# Patient Record
Sex: MALE | Race: BLACK OR AFRICAN AMERICAN | Employment: OTHER | URBAN - METROPOLITAN AREA
[De-identification: names, ages, dates, MRNs, and addresses within clinical notes are randomized per-mention and may not be internally consistent; named-entity substitution may affect disease eponyms.]

---

## 2018-08-17 ENCOUNTER — HOSPITAL ENCOUNTER (OUTPATIENT)
Age: 78
Setting detail: SPECIMEN
Discharge: HOME | End: 2018-08-17
Payer: MEDICARE

## 2018-08-17 LAB
ANION GAP SERPL CALCULATED.3IONS-SCNC: 13 MMOL/L (ref 9–17)
BUN BLDV-MCNC: 52 MG/DL (ref 8–23)
BUN/CREAT BLD: ABNORMAL (ref 9–20)
CALCIUM SERPL-MCNC: 8.8 MG/DL (ref 8.6–10.4)
CHLORIDE BLD-SCNC: 102 MMOL/L (ref 98–107)
CO2: 22 MMOL/L (ref 20–31)
CREAT SERPL-MCNC: 1.66 MG/DL (ref 0.7–1.2)
GFR AFRICAN AMERICAN: 49 ML/MIN
GFR NON-AFRICAN AMERICAN: 40 ML/MIN
GFR SERPL CREATININE-BSD FRML MDRD: ABNORMAL ML/MIN/{1.73_M2}
GFR SERPL CREATININE-BSD FRML MDRD: ABNORMAL ML/MIN/{1.73_M2}
GLUCOSE BLD-MCNC: 71 MG/DL (ref 70–99)
HCT VFR BLD CALC: 29.8 % (ref 40.7–50.3)
HEMOGLOBIN: 9.3 G/DL (ref 13–17)
MCH RBC QN AUTO: 30.4 PG (ref 25.2–33.5)
MCHC RBC AUTO-ENTMCNC: 31.2 G/DL (ref 28.4–34.8)
MCV RBC AUTO: 97.4 FL (ref 82.6–102.9)
NRBC AUTOMATED: 0 PER 100 WBC
PDW BLD-RTO: 15.1 % (ref 11.8–14.4)
PLATELET # BLD: 233 K/UL (ref 138–453)
PMV BLD AUTO: 9.1 FL (ref 8.1–13.5)
POTASSIUM SERPL-SCNC: 5.4 MMOL/L (ref 3.7–5.3)
RBC # BLD: 3.06 M/UL (ref 4.21–5.77)
SODIUM BLD-SCNC: 137 MMOL/L (ref 135–144)
WBC # BLD: 4.1 K/UL (ref 3.5–11.3)

## 2018-08-17 PROCEDURE — 36415 COLL VENOUS BLD VENIPUNCTURE: CPT

## 2018-08-17 PROCEDURE — P9603 ONE-WAY ALLOW PRORATED MILES: HCPCS

## 2018-08-17 PROCEDURE — 80048 BASIC METABOLIC PNL TOTAL CA: CPT

## 2018-08-17 PROCEDURE — 85027 COMPLETE CBC AUTOMATED: CPT

## 2018-08-24 ENCOUNTER — HOSPITAL ENCOUNTER (OUTPATIENT)
Age: 78
Setting detail: SPECIMEN
Discharge: HOME | End: 2018-08-24
Payer: MEDICARE

## 2018-08-24 LAB
ANION GAP SERPL CALCULATED.3IONS-SCNC: 11 MMOL/L (ref 9–17)
BUN BLDV-MCNC: 49 MG/DL (ref 8–23)
BUN/CREAT BLD: ABNORMAL (ref 9–20)
CALCIUM SERPL-MCNC: 8.9 MG/DL (ref 8.6–10.4)
CHLORIDE BLD-SCNC: 106 MMOL/L (ref 98–107)
CO2: 21 MMOL/L (ref 20–31)
CREAT SERPL-MCNC: 1.41 MG/DL (ref 0.7–1.2)
GFR AFRICAN AMERICAN: 59 ML/MIN
GFR NON-AFRICAN AMERICAN: 49 ML/MIN
GFR SERPL CREATININE-BSD FRML MDRD: ABNORMAL ML/MIN/{1.73_M2}
GFR SERPL CREATININE-BSD FRML MDRD: ABNORMAL ML/MIN/{1.73_M2}
GLUCOSE BLD-MCNC: 72 MG/DL (ref 70–99)
HCT VFR BLD CALC: 29.1 % (ref 40.7–50.3)
HEMOGLOBIN: 9.4 G/DL (ref 13–17)
MCH RBC QN AUTO: 30.8 PG (ref 25.2–33.5)
MCHC RBC AUTO-ENTMCNC: 32.3 G/DL (ref 28.4–34.8)
MCV RBC AUTO: 95.4 FL (ref 82.6–102.9)
NRBC AUTOMATED: 0 PER 100 WBC
PDW BLD-RTO: 15 % (ref 11.8–14.4)
PLATELET # BLD: 257 K/UL (ref 138–453)
PMV BLD AUTO: 9 FL (ref 8.1–13.5)
POTASSIUM SERPL-SCNC: 6 MMOL/L (ref 3.7–5.3)
RBC # BLD: 3.05 M/UL (ref 4.21–5.77)
SODIUM BLD-SCNC: 138 MMOL/L (ref 135–144)
WBC # BLD: 3.8 K/UL (ref 3.5–11.3)

## 2018-08-24 PROCEDURE — 85027 COMPLETE CBC AUTOMATED: CPT

## 2018-08-24 PROCEDURE — 80048 BASIC METABOLIC PNL TOTAL CA: CPT

## 2018-08-24 PROCEDURE — 36415 COLL VENOUS BLD VENIPUNCTURE: CPT

## 2018-08-24 PROCEDURE — P9603 ONE-WAY ALLOW PRORATED MILES: HCPCS

## 2018-08-27 ENCOUNTER — HOSPITAL ENCOUNTER (OUTPATIENT)
Age: 78
Setting detail: SPECIMEN
Discharge: HOME | End: 2018-08-27
Payer: MEDICARE

## 2018-08-27 LAB
ANION GAP SERPL CALCULATED.3IONS-SCNC: 12 MMOL/L (ref 9–17)
BUN BLDV-MCNC: 47 MG/DL (ref 8–23)
BUN/CREAT BLD: ABNORMAL (ref 9–20)
CALCIUM SERPL-MCNC: 9.1 MG/DL (ref 8.6–10.4)
CHLORIDE BLD-SCNC: 109 MMOL/L (ref 98–107)
CO2: 20 MMOL/L (ref 20–31)
CREAT SERPL-MCNC: 1.59 MG/DL (ref 0.7–1.2)
GFR AFRICAN AMERICAN: 51 ML/MIN
GFR NON-AFRICAN AMERICAN: 42 ML/MIN
GFR SERPL CREATININE-BSD FRML MDRD: ABNORMAL ML/MIN/{1.73_M2}
GFR SERPL CREATININE-BSD FRML MDRD: ABNORMAL ML/MIN/{1.73_M2}
GLUCOSE BLD-MCNC: 57 MG/DL (ref 70–99)
HCT VFR BLD CALC: 30.8 % (ref 40.7–50.3)
HEMOGLOBIN: 9.6 G/DL (ref 13–17)
MCH RBC QN AUTO: 29.8 PG (ref 25.2–33.5)
MCHC RBC AUTO-ENTMCNC: 31.2 G/DL (ref 28.4–34.8)
MCV RBC AUTO: 95.7 FL (ref 82.6–102.9)
NRBC AUTOMATED: 0 PER 100 WBC
PDW BLD-RTO: 15.1 % (ref 11.8–14.4)
PLATELET # BLD: 248 K/UL (ref 138–453)
PMV BLD AUTO: 9.3 FL (ref 8.1–13.5)
POTASSIUM SERPL-SCNC: 5.8 MMOL/L (ref 3.7–5.3)
RBC # BLD: 3.22 M/UL (ref 4.21–5.77)
SODIUM BLD-SCNC: 141 MMOL/L (ref 135–144)
WBC # BLD: 3.7 K/UL (ref 3.5–11.3)

## 2018-08-27 PROCEDURE — P9603 ONE-WAY ALLOW PRORATED MILES: HCPCS

## 2018-08-27 PROCEDURE — 80048 BASIC METABOLIC PNL TOTAL CA: CPT

## 2018-08-27 PROCEDURE — 36415 COLL VENOUS BLD VENIPUNCTURE: CPT

## 2018-08-27 PROCEDURE — 85027 COMPLETE CBC AUTOMATED: CPT

## 2018-08-31 ENCOUNTER — HOSPITAL ENCOUNTER (OUTPATIENT)
Age: 78
Setting detail: SPECIMEN
Discharge: HOME | End: 2018-08-31
Payer: MEDICARE

## 2018-08-31 LAB
ANION GAP SERPL CALCULATED.3IONS-SCNC: 15 MMOL/L (ref 9–17)
BUN BLDV-MCNC: 61 MG/DL (ref 8–23)
BUN/CREAT BLD: ABNORMAL (ref 9–20)
CALCIUM SERPL-MCNC: 9.8 MG/DL (ref 8.6–10.4)
CHLORIDE BLD-SCNC: 95 MMOL/L (ref 98–107)
CO2: 25 MMOL/L (ref 20–31)
CREAT SERPL-MCNC: 1.72 MG/DL (ref 0.7–1.2)
GFR AFRICAN AMERICAN: 47 ML/MIN
GFR NON-AFRICAN AMERICAN: 39 ML/MIN
GFR SERPL CREATININE-BSD FRML MDRD: ABNORMAL ML/MIN/{1.73_M2}
GFR SERPL CREATININE-BSD FRML MDRD: ABNORMAL ML/MIN/{1.73_M2}
GLUCOSE BLD-MCNC: 111 MG/DL (ref 70–99)
HCT VFR BLD CALC: 41.3 % (ref 40.7–50.3)
HEMOGLOBIN: 12.7 G/DL (ref 13–17)
MCH RBC QN AUTO: 30.2 PG (ref 25.2–33.5)
MCHC RBC AUTO-ENTMCNC: 30.8 G/DL (ref 28.4–34.8)
MCV RBC AUTO: 98.3 FL (ref 82.6–102.9)
NRBC AUTOMATED: 0.4 PER 100 WBC
PDW BLD-RTO: 17.5 % (ref 11.8–14.4)
PLATELET # BLD: 122 K/UL (ref 138–453)
PMV BLD AUTO: 12.6 FL (ref 8.1–13.5)
POTASSIUM SERPL-SCNC: 4.4 MMOL/L (ref 3.7–5.3)
RBC # BLD: 4.2 M/UL (ref 4.21–5.77)
SODIUM BLD-SCNC: 135 MMOL/L (ref 135–144)
WBC # BLD: 8.3 K/UL (ref 3.5–11.3)

## 2018-08-31 PROCEDURE — 36415 COLL VENOUS BLD VENIPUNCTURE: CPT

## 2018-08-31 PROCEDURE — 80048 BASIC METABOLIC PNL TOTAL CA: CPT

## 2018-08-31 PROCEDURE — P9603 ONE-WAY ALLOW PRORATED MILES: HCPCS

## 2018-08-31 PROCEDURE — 85027 COMPLETE CBC AUTOMATED: CPT

## 2018-12-18 ENCOUNTER — HOSPITAL ENCOUNTER (INPATIENT)
Age: 78
LOS: 8 days | Discharge: NURSING, REHAB OR LONG-TERM CARE | DRG: 682 | End: 2018-12-26
Attending: EMERGENCY MEDICINE | Admitting: INTERNAL MEDICINE
Payer: MEDICARE

## 2018-12-18 ENCOUNTER — APPOINTMENT (OUTPATIENT)
Dept: GENERAL RADIOLOGY | Age: 78
DRG: 682 | End: 2018-12-18
Payer: MEDICARE

## 2018-12-18 DIAGNOSIS — D64.9 ANEMIA, UNSPECIFIED TYPE: ICD-10-CM

## 2018-12-18 DIAGNOSIS — N17.9 AKI (ACUTE KIDNEY INJURY) (HCC): ICD-10-CM

## 2018-12-18 DIAGNOSIS — R41.82 ALTERED MENTAL STATUS, UNSPECIFIED ALTERED MENTAL STATUS TYPE: Primary | ICD-10-CM

## 2018-12-18 LAB
-: NORMAL
ABSOLUTE EOS #: 0 K/UL (ref 0–0.4)
ABSOLUTE IMMATURE GRANULOCYTE: 0 K/UL (ref 0–0.3)
ABSOLUTE LYMPH #: 0.42 K/UL (ref 1–4.8)
ABSOLUTE MONO #: 0.47 K/UL (ref 0.1–0.8)
ALBUMIN SERPL-MCNC: 3.5 G/DL (ref 3.5–5.2)
ALBUMIN/GLOBULIN RATIO: 0.9 (ref 1–2.5)
ALLEN TEST: ABNORMAL
ALP BLD-CCNC: 69 U/L (ref 40–129)
ALT SERPL-CCNC: 16 U/L (ref 5–41)
AMMONIA: 32 UMOL/L (ref 16–60)
AMORPHOUS: NORMAL
ANION GAP SERPL CALCULATED.3IONS-SCNC: 13 MMOL/L (ref 9–17)
ANION GAP: 7 MMOL/L (ref 7–16)
AST SERPL-CCNC: 52 U/L
BACTERIA: NORMAL
BASOPHILS # BLD: 0 % (ref 0–2)
BASOPHILS ABSOLUTE: 0 K/UL (ref 0–0.2)
BILIRUB SERPL-MCNC: 0.23 MG/DL (ref 0.3–1.2)
BILIRUBIN URINE: NEGATIVE
BUN BLDV-MCNC: 53 MG/DL (ref 8–23)
BUN/CREAT BLD: ABNORMAL (ref 9–20)
CALCIUM SERPL-MCNC: 9 MG/DL (ref 8.6–10.4)
CASTS UA: NORMAL /LPF (ref 0–8)
CHLORIDE BLD-SCNC: 110 MMOL/L (ref 98–107)
CO2: 18 MMOL/L (ref 20–31)
COLOR: YELLOW
COMMENT UA: ABNORMAL
CREAT SERPL-MCNC: 2.25 MG/DL (ref 0.7–1.2)
CRYSTALS, UA: NORMAL /HPF
DIFFERENTIAL TYPE: ABNORMAL
EKG ATRIAL RATE: 94 BPM
EKG P AXIS: 39 DEGREES
EKG P-R INTERVAL: 132 MS
EKG Q-T INTERVAL: 342 MS
EKG QRS DURATION: 84 MS
EKG QTC CALCULATION (BAZETT): 427 MS
EKG R AXIS: 60 DEGREES
EKG T AXIS: 55 DEGREES
EKG VENTRICULAR RATE: 94 BPM
EOSINOPHILS RELATIVE PERCENT: 0 % (ref 1–4)
EPITHELIAL CELLS UA: NORMAL /HPF (ref 0–5)
FIO2: ABNORMAL
GFR AFRICAN AMERICAN: 34 ML/MIN
GFR NON-AFRICAN AMERICAN: 28 ML/MIN
GFR NON-AFRICAN AMERICAN: 32 ML/MIN
GFR SERPL CREATININE-BSD FRML MDRD: 39 ML/MIN
GFR SERPL CREATININE-BSD FRML MDRD: ABNORMAL ML/MIN/{1.73_M2}
GLUCOSE BLD-MCNC: 120 MG/DL (ref 74–100)
GLUCOSE BLD-MCNC: 127 MG/DL (ref 70–99)
GLUCOSE URINE: NEGATIVE
HCO3 VENOUS: 19.2 MMOL/L (ref 22–29)
HCT VFR BLD CALC: 31.9 % (ref 40.7–50.3)
HEMOGLOBIN: 9.9 G/DL (ref 13–17)
IMMATURE GRANULOCYTES: 0 %
INR BLD: 0.9
KETONES, URINE: NEGATIVE
LACTIC ACID, SEPSIS WHOLE BLOOD: 0.8 MMOL/L (ref 0.5–1.9)
LACTIC ACID, SEPSIS WHOLE BLOOD: 1.3 MMOL/L (ref 0.5–1.9)
LACTIC ACID, SEPSIS WHOLE BLOOD: 1.7 MMOL/L (ref 0.5–1.9)
LACTIC ACID, SEPSIS: NORMAL MMOL/L (ref 0.5–1.9)
LEUKOCYTE ESTERASE, URINE: NEGATIVE
LYMPHOCYTES # BLD: 8 % (ref 24–44)
MAGNESIUM: 2.4 MG/DL (ref 1.6–2.6)
MCH RBC QN AUTO: 28.2 PG (ref 25.2–33.5)
MCHC RBC AUTO-ENTMCNC: 31 G/DL (ref 28.4–34.8)
MCV RBC AUTO: 90.9 FL (ref 82.6–102.9)
MODE: ABNORMAL
MONOCYTES # BLD: 9 % (ref 1–7)
MORPHOLOGY: ABNORMAL
MUCUS: NORMAL
NEGATIVE BASE EXCESS, VEN: 6 (ref 0–2)
NITRITE, URINE: NEGATIVE
NRBC AUTOMATED: 0 PER 100 WBC
O2 DEVICE/FLOW/%: ABNORMAL
O2 SAT, VEN: 62 % (ref 60–85)
OTHER OBSERVATIONS UA: NORMAL
PARTIAL THROMBOPLASTIN TIME: 30.3 SEC (ref 20.5–30.5)
PATIENT TEMP: ABNORMAL
PCO2, VEN: 35.8 MM HG (ref 41–51)
PDW BLD-RTO: 16.2 % (ref 11.8–14.4)
PH UA: 5 (ref 5–8)
PH VENOUS: 7.34 (ref 7.32–7.43)
PLATELET # BLD: 230 K/UL (ref 138–453)
PLATELET ESTIMATE: ABNORMAL
PMV BLD AUTO: 8.9 FL (ref 8.1–13.5)
PO2, VEN: 34.1 MM HG (ref 30–50)
POC CHLORIDE: 119 MMOL/L (ref 98–107)
POC CREATININE: 2.01 MG/DL (ref 0.51–1.19)
POC HEMATOCRIT: 29 % (ref 41–53)
POC HEMOGLOBIN: 10 G/DL (ref 13.5–17.5)
POC IONIZED CALCIUM: 1.13 MMOL/L (ref 1.15–1.33)
POC LACTIC ACID: 1.76 MMOL/L (ref 0.56–1.39)
POC PCO2 TEMP: ABNORMAL MM HG
POC PH TEMP: ABNORMAL
POC PO2 TEMP: ABNORMAL MM HG
POC POTASSIUM: 4.5 MMOL/L (ref 3.5–4.5)
POC SODIUM: 145 MMOL/L (ref 138–146)
POC TROPONIN I: 0.02 NG/ML (ref 0–0.1)
POC TROPONIN I: 0.03 NG/ML (ref 0–0.1)
POC TROPONIN INTERP: NORMAL
POC TROPONIN INTERP: NORMAL
POSITIVE BASE EXCESS, VEN: ABNORMAL (ref 0–3)
POTASSIUM SERPL-SCNC: 4.4 MMOL/L (ref 3.7–5.3)
PROTEIN UA: ABNORMAL
PROTHROMBIN TIME: 9.6 SEC (ref 9–12)
RBC # BLD: 3.51 M/UL (ref 4.21–5.77)
RBC # BLD: ABNORMAL 10*6/UL
RBC UA: NORMAL /HPF (ref 0–4)
RENAL EPITHELIAL, UA: NORMAL /HPF
SAMPLE SITE: ABNORMAL
SEG NEUTROPHILS: 83 % (ref 36–66)
SEGMENTED NEUTROPHILS ABSOLUTE COUNT: 4.31 K/UL (ref 1.8–7.7)
SODIUM BLD-SCNC: 141 MMOL/L (ref 135–144)
SPECIFIC GRAVITY UA: 1.02 (ref 1–1.03)
TOTAL CO2, VENOUS: 20 MMOL/L (ref 23–30)
TOTAL PROTEIN: 7.2 G/DL (ref 6.4–8.3)
TRICHOMONAS: NORMAL
TURBIDITY: CLEAR
URINE HGB: ABNORMAL
UROBILINOGEN, URINE: NORMAL
WBC # BLD: 5.2 K/UL (ref 3.5–11.3)
WBC # BLD: ABNORMAL 10*3/UL
WBC UA: NORMAL /HPF (ref 0–5)
YEAST: NORMAL

## 2018-12-18 PROCEDURE — P9612 CATHETERIZE FOR URINE SPEC: HCPCS

## 2018-12-18 PROCEDURE — 82803 BLOOD GASES ANY COMBINATION: CPT

## 2018-12-18 PROCEDURE — 1200000000 HC SEMI PRIVATE

## 2018-12-18 PROCEDURE — 83735 ASSAY OF MAGNESIUM: CPT

## 2018-12-18 PROCEDURE — 96367 TX/PROPH/DG ADDL SEQ IV INF: CPT

## 2018-12-18 PROCEDURE — 82565 ASSAY OF CREATININE: CPT

## 2018-12-18 PROCEDURE — 99285 EMERGENCY DEPT VISIT HI MDM: CPT

## 2018-12-18 PROCEDURE — 81001 URINALYSIS AUTO W/SCOPE: CPT

## 2018-12-18 PROCEDURE — 87040 BLOOD CULTURE FOR BACTERIA: CPT

## 2018-12-18 PROCEDURE — 84295 ASSAY OF SERUM SODIUM: CPT

## 2018-12-18 PROCEDURE — 85610 PROTHROMBIN TIME: CPT

## 2018-12-18 PROCEDURE — 82140 ASSAY OF AMMONIA: CPT

## 2018-12-18 PROCEDURE — 96368 THER/DIAG CONCURRENT INF: CPT

## 2018-12-18 PROCEDURE — 84484 ASSAY OF TROPONIN QUANT: CPT

## 2018-12-18 PROCEDURE — 93005 ELECTROCARDIOGRAM TRACING: CPT

## 2018-12-18 PROCEDURE — 82947 ASSAY GLUCOSE BLOOD QUANT: CPT

## 2018-12-18 PROCEDURE — 83605 ASSAY OF LACTIC ACID: CPT

## 2018-12-18 PROCEDURE — 82435 ASSAY OF BLOOD CHLORIDE: CPT

## 2018-12-18 PROCEDURE — 85730 THROMBOPLASTIN TIME PARTIAL: CPT

## 2018-12-18 PROCEDURE — 96365 THER/PROPH/DIAG IV INF INIT: CPT

## 2018-12-18 PROCEDURE — 84132 ASSAY OF SERUM POTASSIUM: CPT

## 2018-12-18 PROCEDURE — 85014 HEMATOCRIT: CPT

## 2018-12-18 PROCEDURE — 80053 COMPREHEN METABOLIC PANEL: CPT

## 2018-12-18 PROCEDURE — 2580000003 HC RX 258: Performed by: EMERGENCY MEDICINE

## 2018-12-18 PROCEDURE — 85025 COMPLETE CBC W/AUTO DIFF WBC: CPT

## 2018-12-18 PROCEDURE — 82330 ASSAY OF CALCIUM: CPT

## 2018-12-18 PROCEDURE — 6360000002 HC RX W HCPCS: Performed by: EMERGENCY MEDICINE

## 2018-12-18 PROCEDURE — 96366 THER/PROPH/DIAG IV INF ADDON: CPT

## 2018-12-18 PROCEDURE — 71045 X-RAY EXAM CHEST 1 VIEW: CPT

## 2018-12-18 RX ORDER — TRAMADOL HYDROCHLORIDE 50 MG/1
50 TABLET ORAL EVERY 6 HOURS PRN
Status: ON HOLD | COMMUNITY
End: 2018-12-21 | Stop reason: HOSPADM

## 2018-12-18 RX ORDER — VITAMIN E 268 MG
400 CAPSULE ORAL DAILY
COMMUNITY

## 2018-12-18 RX ORDER — TAMSULOSIN HYDROCHLORIDE 0.4 MG/1
0.4 CAPSULE ORAL DAILY
COMMUNITY

## 2018-12-18 RX ORDER — DOCUSATE SODIUM 100 MG/1
100 CAPSULE, LIQUID FILLED ORAL NIGHTLY
COMMUNITY

## 2018-12-18 RX ORDER — MIRTAZAPINE 30 MG/1
30 TABLET, FILM COATED ORAL NIGHTLY
COMMUNITY

## 2018-12-18 RX ORDER — LOSARTAN POTASSIUM 100 MG/1
100 TABLET ORAL DAILY
Status: ON HOLD | COMMUNITY
End: 2019-05-07 | Stop reason: HOSPADM

## 2018-12-18 RX ORDER — SODIUM CHLORIDE 0.9 % (FLUSH) 0.9 %
10 SYRINGE (ML) INJECTION EVERY 12 HOURS SCHEDULED
Status: DISCONTINUED | OUTPATIENT
Start: 2018-12-18 | End: 2018-12-19

## 2018-12-18 RX ORDER — VANCOMYCIN HYDROCHLORIDE 1 G/200ML
15 INJECTION, SOLUTION INTRAVENOUS ONCE
Status: COMPLETED | OUTPATIENT
Start: 2018-12-18 | End: 2018-12-19

## 2018-12-18 RX ORDER — HYDROCHLOROTHIAZIDE 12.5 MG/1
12.5 TABLET ORAL DAILY
Status: ON HOLD | COMMUNITY
End: 2018-12-25 | Stop reason: HOSPADM

## 2018-12-18 RX ORDER — SODIUM CHLORIDE, SODIUM LACTATE, POTASSIUM CHLORIDE, AND CALCIUM CHLORIDE .6; .31; .03; .02 G/100ML; G/100ML; G/100ML; G/100ML
30 INJECTION, SOLUTION INTRAVENOUS ONCE
Status: COMPLETED | OUTPATIENT
Start: 2018-12-18 | End: 2018-12-19

## 2018-12-18 RX ORDER — SODIUM CHLORIDE 0.9 % (FLUSH) 0.9 %
10 SYRINGE (ML) INJECTION PRN
Status: DISCONTINUED | OUTPATIENT
Start: 2018-12-18 | End: 2018-12-19

## 2018-12-18 RX ADMIN — PIPERACILLIN AND TAZOBACTAM 3.38 G: 3; .375 INJECTION, POWDER, LYOPHILIZED, FOR SOLUTION INTRAVENOUS; PARENTERAL at 20:03

## 2018-12-18 RX ADMIN — SODIUM CHLORIDE, POTASSIUM CHLORIDE, SODIUM LACTATE AND CALCIUM CHLORIDE 1000 ML: 600; 310; 30; 20 INJECTION, SOLUTION INTRAVENOUS at 20:03

## 2018-12-18 RX ADMIN — VANCOMYCIN HYDROCHLORIDE 1000 MG: 1 INJECTION, SOLUTION INTRAVENOUS at 21:49

## 2018-12-18 NOTE — ED PROVIDER NOTES
but was much more confused today was tired and walked hunched over with pain in the suprapubic region the pt had no fevers and no cough or SOB. The pt denied any new pain    DIAGNOSTIC RESULTS     RADIOLOGY:   XR CHEST PORTABLE   Final Result   Low lung volumes with bibasilar atelectasis.          No pneumonia on imaging and no cough    LABS:  Labs Reviewed   COMPREHENSIVE METABOLIC PANEL - Abnormal; Notable for the following:        Result Value    Glucose 127 (*)     BUN 53 (*)     CREATININE 2.25 (*)     Chloride 110 (*)     CO2 18 (*)     AST 52 (*)     Total Bilirubin 0.23 (*)     Albumin/Globulin Ratio 0.9 (*)     GFR Non- 28 (*)     GFR  34 (*)     All other components within normal limits   CBC WITH AUTO DIFFERENTIAL - Abnormal; Notable for the following:     RBC 3.51 (*)     Hemoglobin 9.9 (*)     Hematocrit 31.9 (*)     RDW 16.2 (*)     Seg Neutrophils 83 (*)     Lymphocytes 8 (*)     Monocytes 9 (*)     Eosinophils % 0 (*)     Absolute Lymph # 0.42 (*)     All other components within normal limits   HGB/HCT - Abnormal; Notable for the following:     POC Hemoglobin 10.0 (*)     POC Hematocrit 29 (*)     All other components within normal limits   CHLORIDE (POC) - Abnormal; Notable for the following:     POC Chloride 119 (*)     All other components within normal limits   CALCIUM, IONIC (POC) - Abnormal; Notable for the following:     POC Ionized Calcium 1.13 (*)     All other components within normal limits   VENOUS BLOOD GAS, POINT OF CARE - Abnormal; Notable for the following:     pCO2, Senthil 35.8 (*)     HCO3, Venous 19.2 (*)     Total CO2, Venous 20 (*)     Negative Base Excess, Senthil 6 (*)     All other components within normal limits   CREATININE W/GFR POINT OF CARE - Abnormal; Notable for the following:     POC Creatinine 2.01 (*)     GFR Comment 39 (*)     GFR Non- 32 (*)     All other components within normal limits   LACTIC ACID,POINT OF CARE - by:  Meningitis  Pyelonephritis  Pneumonia  Otitis Media  Skin Infection   Osteomyelitis  Oral infection  Generalized Viral illness  Non Infections causes (overdose, hyperthermia, recent vaccination. ..)    The pt had no stiff neck or signs of meningitis, the pt still waiting on urine but with the suprapubic tenderness this is most likely awaiting urine currently. The pt had no signs of pneumonia there was no ear pain or signs of otitis media. The pt had no skin infections or cellulitis. There was no signs of obvious viral URI or illness there was no oral infection and the pt had no spinal tenderness or signs of osteomylitis on exam.        Justus Samuel, , RDMS.   Attending Emergency Physician          Justus Samuel DO  12/18/18 0251

## 2018-12-18 NOTE — ED NOTES
Bed: 19  Expected date:   Expected time:   Means of arrival:   Comments:  [address], [address] [facility]  12/18/18 1148

## 2018-12-19 ENCOUNTER — APPOINTMENT (OUTPATIENT)
Dept: ULTRASOUND IMAGING | Age: 78
DRG: 682 | End: 2018-12-19
Payer: MEDICARE

## 2018-12-19 PROBLEM — N18.30 STAGE 3 CHRONIC KIDNEY DISEASE (HCC): Status: ACTIVE | Noted: 2018-12-19

## 2018-12-19 PROBLEM — F03.90 DEMENTIA (HCC): Status: ACTIVE | Noted: 2018-12-19

## 2018-12-19 PROBLEM — N18.30 ANEMIA DUE TO STAGE 3 CHRONIC KIDNEY DISEASE (HCC): Status: ACTIVE | Noted: 2018-12-19

## 2018-12-19 PROBLEM — I10 ESSENTIAL HYPERTENSION: Status: ACTIVE | Noted: 2018-12-19

## 2018-12-19 PROBLEM — M19.90 ARTHRITIS: Status: ACTIVE | Noted: 2018-12-19

## 2018-12-19 PROBLEM — N17.9 AKI (ACUTE KIDNEY INJURY) (HCC): Status: ACTIVE | Noted: 2018-12-19

## 2018-12-19 PROBLEM — D63.1 ANEMIA DUE TO STAGE 3 CHRONIC KIDNEY DISEASE (HCC): Status: ACTIVE | Noted: 2018-12-19

## 2018-12-19 PROBLEM — G93.41 METABOLIC ENCEPHALOPATHY: Status: ACTIVE | Noted: 2018-12-18

## 2018-12-19 LAB
-: ABNORMAL
ALBUMIN SERPL-MCNC: 3.1 G/DL (ref 3.5–5.2)
ALBUMIN/GLOBULIN RATIO: 0.9 (ref 1–2.5)
ALP BLD-CCNC: 60 U/L (ref 40–129)
ALT SERPL-CCNC: 19 U/L (ref 5–41)
AMORPHOUS: ABNORMAL
AMPHETAMINE SCREEN URINE: NEGATIVE
ANION GAP SERPL CALCULATED.3IONS-SCNC: 13 MMOL/L (ref 9–17)
AST SERPL-CCNC: 74 U/L
BACTERIA: ABNORMAL
BARBITURATE SCREEN URINE: NEGATIVE
BENZODIAZEPINE SCREEN, URINE: NEGATIVE
BILIRUB SERPL-MCNC: 0.32 MG/DL (ref 0.3–1.2)
BILIRUBIN URINE: NEGATIVE
BUN BLDV-MCNC: 48 MG/DL (ref 8–23)
BUN/CREAT BLD: ABNORMAL (ref 9–20)
BUPRENORPHINE URINE: NORMAL
CALCIUM SERPL-MCNC: 8.7 MG/DL (ref 8.6–10.4)
CANNABINOID SCREEN URINE: NEGATIVE
CASTS UA: ABNORMAL /LPF (ref 0–2)
CHLORIDE BLD-SCNC: 113 MMOL/L (ref 98–107)
CO2: 17 MMOL/L (ref 20–31)
COCAINE METABOLITE, URINE: NEGATIVE
COLOR: YELLOW
CREAT SERPL-MCNC: 2.18 MG/DL (ref 0.7–1.2)
CRYSTALS, UA: ABNORMAL /HPF
EPITHELIAL CELLS UA: ABNORMAL /HPF (ref 0–5)
GFR AFRICAN AMERICAN: 36 ML/MIN
GFR NON-AFRICAN AMERICAN: 29 ML/MIN
GFR SERPL CREATININE-BSD FRML MDRD: ABNORMAL ML/MIN/{1.73_M2}
GFR SERPL CREATININE-BSD FRML MDRD: ABNORMAL ML/MIN/{1.73_M2}
GLUCOSE BLD-MCNC: 91 MG/DL (ref 70–99)
GLUCOSE URINE: NEGATIVE
HCT VFR BLD CALC: 29.9 % (ref 40.7–50.3)
HEMOGLOBIN: 9.3 G/DL (ref 13–17)
INR BLD: 1
KETONES, URINE: NEGATIVE
LEUKOCYTE ESTERASE, URINE: NEGATIVE
MAGNESIUM: 2.3 MG/DL (ref 1.6–2.6)
MCH RBC QN AUTO: 28.1 PG (ref 25.2–33.5)
MCHC RBC AUTO-ENTMCNC: 31.1 G/DL (ref 28.4–34.8)
MCV RBC AUTO: 90.3 FL (ref 82.6–102.9)
MDMA URINE: NORMAL
METHADONE SCREEN, URINE: NEGATIVE
METHAMPHETAMINE, URINE: NORMAL
MUCUS: ABNORMAL
NITRITE, URINE: NEGATIVE
NRBC AUTOMATED: 0 PER 100 WBC
OPIATES, URINE: NEGATIVE
OTHER OBSERVATIONS UA: ABNORMAL
OXYCODONE SCREEN URINE: NEGATIVE
PDW BLD-RTO: 16 % (ref 11.8–14.4)
PH UA: 5 (ref 5–8)
PHENCYCLIDINE, URINE: NEGATIVE
PLATELET # BLD: 203 K/UL (ref 138–453)
PMV BLD AUTO: 8.7 FL (ref 8.1–13.5)
POTASSIUM SERPL-SCNC: 4.5 MMOL/L (ref 3.7–5.3)
PROPOXYPHENE, URINE: NORMAL
PROTEIN UA: ABNORMAL
PROTHROMBIN TIME: 10.5 SEC (ref 9–12)
RBC # BLD: 3.31 M/UL (ref 4.21–5.77)
RBC UA: ABNORMAL /HPF (ref 0–2)
RENAL EPITHELIAL, UA: ABNORMAL /HPF
SODIUM BLD-SCNC: 143 MMOL/L (ref 135–144)
SODIUM,UR: 111 MMOL/L
SPECIFIC GRAVITY UA: 1.02 (ref 1–1.03)
TEST INFORMATION: NORMAL
TOTAL PROTEIN, URINE: 202 MG/DL
TOTAL PROTEIN: 6.7 G/DL (ref 6.4–8.3)
TRICHOMONAS: ABNORMAL
TRICYCLIC ANTIDEPRESSANTS, UR: NORMAL
TURBIDITY: ABNORMAL
URINE HGB: ABNORMAL
UROBILINOGEN, URINE: NORMAL
WBC # BLD: 4.1 K/UL (ref 3.5–11.3)
WBC UA: ABNORMAL /HPF (ref 0–5)
YEAST: ABNORMAL

## 2018-12-19 PROCEDURE — 85027 COMPLETE CBC AUTOMATED: CPT

## 2018-12-19 PROCEDURE — 80307 DRUG TEST PRSMV CHEM ANLYZR: CPT

## 2018-12-19 PROCEDURE — 97166 OT EVAL MOD COMPLEX 45 MIN: CPT

## 2018-12-19 PROCEDURE — 97162 PT EVAL MOD COMPLEX 30 MIN: CPT

## 2018-12-19 PROCEDURE — 81001 URINALYSIS AUTO W/SCOPE: CPT

## 2018-12-19 PROCEDURE — G8988 SELF CARE GOAL STATUS: HCPCS

## 2018-12-19 PROCEDURE — 1200000000 HC SEMI PRIVATE

## 2018-12-19 PROCEDURE — 36415 COLL VENOUS BLD VENIPUNCTURE: CPT

## 2018-12-19 PROCEDURE — 6360000002 HC RX W HCPCS: Performed by: NURSE PRACTITIONER

## 2018-12-19 PROCEDURE — 85610 PROTHROMBIN TIME: CPT

## 2018-12-19 PROCEDURE — 80053 COMPREHEN METABOLIC PANEL: CPT

## 2018-12-19 PROCEDURE — G8978 MOBILITY CURRENT STATUS: HCPCS

## 2018-12-19 PROCEDURE — 6370000000 HC RX 637 (ALT 250 FOR IP): Performed by: NURSE PRACTITIONER

## 2018-12-19 PROCEDURE — 94760 N-INVAS EAR/PLS OXIMETRY 1: CPT

## 2018-12-19 PROCEDURE — 97530 THERAPEUTIC ACTIVITIES: CPT

## 2018-12-19 PROCEDURE — 76770 US EXAM ABDO BACK WALL COMP: CPT

## 2018-12-19 PROCEDURE — 99222 1ST HOSP IP/OBS MODERATE 55: CPT | Performed by: INTERNAL MEDICINE

## 2018-12-19 PROCEDURE — 83735 ASSAY OF MAGNESIUM: CPT

## 2018-12-19 PROCEDURE — G8987 SELF CARE CURRENT STATUS: HCPCS

## 2018-12-19 PROCEDURE — 2580000003 HC RX 258: Performed by: NURSE PRACTITIONER

## 2018-12-19 PROCEDURE — 84300 ASSAY OF URINE SODIUM: CPT

## 2018-12-19 PROCEDURE — G8979 MOBILITY GOAL STATUS: HCPCS

## 2018-12-19 PROCEDURE — 96372 THER/PROPH/DIAG INJ SC/IM: CPT

## 2018-12-19 PROCEDURE — 84156 ASSAY OF PROTEIN URINE: CPT

## 2018-12-19 PROCEDURE — 97535 SELF CARE MNGMENT TRAINING: CPT

## 2018-12-19 RX ORDER — DOCUSATE SODIUM 100 MG/1
100 CAPSULE, LIQUID FILLED ORAL NIGHTLY
Status: DISCONTINUED | OUTPATIENT
Start: 2018-12-19 | End: 2018-12-19 | Stop reason: SDUPTHER

## 2018-12-19 RX ORDER — SODIUM POLYSTYRENE SULFONATE 15 G/60ML
30 SUSPENSION ORAL; RECTAL
Status: ACTIVE | OUTPATIENT
Start: 2018-12-19 | End: 2018-12-19

## 2018-12-19 RX ORDER — TAMSULOSIN HYDROCHLORIDE 0.4 MG/1
0.4 CAPSULE ORAL DAILY
Status: DISCONTINUED | OUTPATIENT
Start: 2018-12-19 | End: 2018-12-26 | Stop reason: HOSPADM

## 2018-12-19 RX ORDER — SODIUM CHLORIDE 0.9 % (FLUSH) 0.9 %
10 SYRINGE (ML) INJECTION PRN
Status: DISCONTINUED | OUTPATIENT
Start: 2018-12-19 | End: 2018-12-26 | Stop reason: HOSPADM

## 2018-12-19 RX ORDER — ONDANSETRON 2 MG/ML
4 INJECTION INTRAMUSCULAR; INTRAVENOUS EVERY 6 HOURS PRN
Status: DISCONTINUED | OUTPATIENT
Start: 2018-12-19 | End: 2018-12-26 | Stop reason: HOSPADM

## 2018-12-19 RX ORDER — LOSARTAN POTASSIUM 50 MG/1
100 TABLET ORAL DAILY
Status: DISCONTINUED | OUTPATIENT
Start: 2018-12-19 | End: 2018-12-26

## 2018-12-19 RX ORDER — NICOTINE 21 MG/24HR
1 PATCH, TRANSDERMAL 24 HOURS TRANSDERMAL DAILY PRN
Status: DISCONTINUED | OUTPATIENT
Start: 2018-12-19 | End: 2018-12-26 | Stop reason: HOSPADM

## 2018-12-19 RX ORDER — DOCUSATE SODIUM 100 MG/1
100 CAPSULE, LIQUID FILLED ORAL 2 TIMES DAILY
Status: DISCONTINUED | OUTPATIENT
Start: 2018-12-19 | End: 2018-12-26 | Stop reason: HOSPADM

## 2018-12-19 RX ORDER — HYDROCHLOROTHIAZIDE 25 MG/1
12.5 TABLET ORAL DAILY
Status: DISCONTINUED | OUTPATIENT
Start: 2018-12-19 | End: 2018-12-19

## 2018-12-19 RX ORDER — HEPARIN SODIUM 5000 [USP'U]/ML
5000 INJECTION, SOLUTION INTRAVENOUS; SUBCUTANEOUS EVERY 8 HOURS SCHEDULED
Status: DISCONTINUED | OUTPATIENT
Start: 2018-12-19 | End: 2018-12-26 | Stop reason: HOSPADM

## 2018-12-19 RX ORDER — MIRTAZAPINE 30 MG/1
30 TABLET, FILM COATED ORAL NIGHTLY
Status: DISCONTINUED | OUTPATIENT
Start: 2018-12-19 | End: 2018-12-26 | Stop reason: HOSPADM

## 2018-12-19 RX ORDER — VITAMIN E 268 MG
400 CAPSULE ORAL DAILY
Status: DISCONTINUED | OUTPATIENT
Start: 2018-12-19 | End: 2018-12-26 | Stop reason: HOSPADM

## 2018-12-19 RX ORDER — ACETAMINOPHEN 325 MG/1
650 TABLET ORAL EVERY 4 HOURS PRN
Status: DISCONTINUED | OUTPATIENT
Start: 2018-12-19 | End: 2018-12-26 | Stop reason: HOSPADM

## 2018-12-19 RX ORDER — SODIUM CHLORIDE 0.9 % (FLUSH) 0.9 %
10 SYRINGE (ML) INJECTION EVERY 12 HOURS SCHEDULED
Status: DISCONTINUED | OUTPATIENT
Start: 2018-12-19 | End: 2018-12-26 | Stop reason: HOSPADM

## 2018-12-19 RX ORDER — SODIUM CHLORIDE 9 MG/ML
INJECTION, SOLUTION INTRAVENOUS CONTINUOUS
Status: DISCONTINUED | OUTPATIENT
Start: 2018-12-19 | End: 2018-12-21

## 2018-12-19 RX ORDER — SODIUM POLYSTYRENE SULFONATE 15 G/60ML
15 SUSPENSION ORAL; RECTAL
Status: ACTIVE | OUTPATIENT
Start: 2018-12-19 | End: 2018-12-19

## 2018-12-19 RX ADMIN — MIRTAZAPINE 30 MG: 30 TABLET, FILM COATED ORAL at 20:33

## 2018-12-19 RX ADMIN — HEPARIN SODIUM 5000 UNITS: 5000 INJECTION, SOLUTION INTRAVENOUS; SUBCUTANEOUS at 20:33

## 2018-12-19 RX ADMIN — LOSARTAN POTASSIUM 100 MG: 50 TABLET, FILM COATED ORAL at 08:45

## 2018-12-19 RX ADMIN — Medication 400 UNITS: at 12:44

## 2018-12-19 RX ADMIN — SODIUM CHLORIDE: 9 INJECTION, SOLUTION INTRAVENOUS at 03:58

## 2018-12-19 RX ADMIN — Medication 10 ML: at 08:46

## 2018-12-19 RX ADMIN — TAMSULOSIN HYDROCHLORIDE 0.4 MG: 0.4 CAPSULE ORAL at 08:46

## 2018-12-19 RX ADMIN — Medication 10 ML: at 20:32

## 2018-12-19 RX ADMIN — SODIUM CHLORIDE: 9 INJECTION, SOLUTION INTRAVENOUS at 18:18

## 2018-12-19 RX ADMIN — HEPARIN SODIUM 5000 UNITS: 5000 INJECTION, SOLUTION INTRAVENOUS; SUBCUTANEOUS at 15:43

## 2018-12-19 RX ADMIN — HEPARIN SODIUM 5000 UNITS: 5000 INJECTION, SOLUTION INTRAVENOUS; SUBCUTANEOUS at 08:47

## 2018-12-19 RX ADMIN — DOCUSATE SODIUM 100 MG: 100 TABLET, FILM COATED ORAL at 20:33

## 2018-12-19 RX ADMIN — DOCUSATE SODIUM 100 MG: 100 TABLET, FILM COATED ORAL at 08:46

## 2018-12-19 NOTE — ED PROVIDER NOTES
[address] ED  Emergency Department  Emergency Medicine Resident Sign-out     Care of Jess Sweet. was assumed from Dr. Diann Brito and is being seen for Altered Mental Status (family states that thept was not appropriately, states that he was constantly bent over today and having difficulty ambulating. pt has pain with palpation to left lower quadrant)  . The patient's initial evaluation and plan have been discussed with the prior provider who initially evaluated the patient.      EMERGENCY DEPARTMENT COURSE / MEDICAL DECISION MAKING:       MEDICATIONS GIVEN:  Orders Placed This Encounter   Medications    sodium chloride flush 0.9 % injection 10 mL    sodium chloride flush 0.9 % injection 10 mL    lactated ringers bolus    piperacillin-tazobactam (ZOSYN) 3.375 g in dextrose 5 % 50 mL IVPB (mini-bag)    vancomycin (VANCOCIN) 1000 mg in dextrose 5% 200 mL IVPB    vancomycin (VANCOCIN) intermittent dosing (placeholder)       LABS / RADIOLOGY:     Labs Reviewed   COMPREHENSIVE METABOLIC PANEL - Abnormal; Notable for the following:        Result Value    Glucose 127 (*)     BUN 53 (*)     CREATININE 2.25 (*)     Chloride 110 (*)     CO2 18 (*)     AST 52 (*)     Total Bilirubin 0.23 (*)     Albumin/Globulin Ratio 0.9 (*)     GFR Non- 28 (*)     GFR  34 (*)     All other components within normal limits   CBC WITH AUTO DIFFERENTIAL - Abnormal; Notable for the following:     RBC 3.51 (*)     Hemoglobin 9.9 (*)     Hematocrit 31.9 (*)     RDW 16.2 (*)     Seg Neutrophils 83 (*)     Lymphocytes 8 (*)     Monocytes 9 (*)     Eosinophils % 0 (*)     Absolute Lymph # 0.42 (*)     All other components within normal limits   HGB/HCT - Abnormal; Notable for the following:     POC Hemoglobin 10.0 (*)     POC Hematocrit 29 (*)     All other components within normal limits   CHLORIDE (POC) - Abnormal; Notable for the following:     POC Chloride 119 (*)     All other components mentation, history of baseline dementia as well however family believes that this is worse mentation than usual.  Patient alert to person, place but not time, significant worsening of creatinine and renal function. Patient admitted to [facility]. OUTSTANDING TASKS / RECOMMENDATIONS:    1. Pending bed placement     FINAL IMPRESSION:     1. Altered mental status, unspecified altered mental status type    2. MAYITO (acute kidney injury) ([facility] [address]    3.  Anemia, unspecified type        DISPOSITION:         DISPOSITION:  []  Discharge   []  Transfer -    [x]  Admission -  IM   []  Against Medical Advice   []  Eloped   FOLLOW-UP: Chavo Mendoza MD  [address] [facility] [place]  [phone]           DISCHARGE MEDICATIONS: New Prescriptions    No medications on file           Roni Cain MD  Emergency Medicine Resident  [address] [facility]       Roni Cain MD  12/19/18 4938

## 2018-12-19 NOTE — ED PROVIDER NOTES
Dr Jaxson Navarro sign out, mental status change, sepsis, pt admitted,      Eunice Thapa, DO  12/18/18 2016 [facility], DO  12/19/18 9563

## 2018-12-19 NOTE — ED PROVIDER NOTES
0. 47 0.1 - 0.8 k/uL    Absolute Eos # 0.00 0.0 - 0.4 k/uL    Basophils # 0.00 0.0 - 0.2 k/uL    Morphology ANISOCYTOSIS PRESENT    Lactate, Sepsis   Result Value Ref Range    Lactic Acid, Sepsis NOT REPORTED 0.5 - 1.9 mmol/L    Lactic Acid, Sepsis, Whole Blood 1.3 0.5 - 1.9 mmol/L   Lactate, Sepsis   Result Value Ref Range    Lactic Acid, Sepsis NOT REPORTED 0.5 - 1.9 mmol/L    Lactic Acid, Sepsis, Whole Blood 0.8 0.5 - 1.9 mmol/L   Hemoglobin and hematocrit, blood   Result Value Ref Range    POC Hemoglobin 10.0 (L) 13.5 - 17.5 g/dL    POC Hematocrit 29 (L) 41 - 53 %   SODIUM (POC)   Result Value Ref Range    POC Sodium 145 138 - 146 mmol/L   POTASSIUM (POC)   Result Value Ref Range    POC Potassium 4.5 3.5 - 4.5 mmol/L   CHLORIDE (POC)   Result Value Ref Range    POC Chloride 119 (H) 98 - 107 mmol/L   CALCIUM, IONIC (POC)   Result Value Ref Range    POC Ionized Calcium 1.13 (L) 1.15 - 1.33 mmol/L   Urinalysis Reflex to Culture   Result Value Ref Range    Color, UA YELLOW YEL    Turbidity UA CLEAR CLEAR    Glucose, Ur NEGATIVE NEG    Bilirubin Urine NEGATIVE NEG    Ketones, Urine NEGATIVE NEG    Specific Gravity, UA 1.016 1.005 - 1.030    Urine Hgb SMALL (A) NEG    pH, UA 5.0 5.0 - 8.0    Protein, UA 3+ (A) NEG    Urobilinogen, Urine Normal NORM    Nitrite, Urine NEGATIVE NEG    Leukocyte Esterase, Urine NEGATIVE NEG    Urinalysis Comments NOT REPORTED    Microscopic Urinalysis   Result Value Ref Range    -          WBC, UA None 0 - 5 /HPF    RBC, UA 5 TO 10 0 - 4 /HPF    Casts UA 0 TO 2 HYALINE 0 - 8 /LPF    Crystals UA NOT REPORTED NONE /HPF    Epithelial Cells UA 0 TO 2 0 - 5 /HPF    Renal Epithelial, Urine NOT REPORTED 0 /HPF    Bacteria, UA NOT REPORTED NONE    Mucus, UA NOT REPORTED NONE    Trichomonas, UA NOT REPORTED NONE    Amorphous, UA NOT REPORTED NONE    Other Observations UA NOT REPORTED NREQ    Yeast, UA NOT REPORTED NONE   Venous Blood Gas, POC   Result Value Ref Range    pH, Senthil 7.338 7.320 - 7.430

## 2018-12-19 NOTE — PROGRESS NOTES
Occupational Therapy   Occupational Therapy Initial Assessment  Date: 2018   Patient Name: Raúl Castro. MRN: [id number]     : 1940    Chief Complaint   Patient presents with    Altered Mental Status     family states that thept was not appropriately, states that he was constantly bent over today and having difficulty ambulating. pt has pain with palpation to left lower quadrant     Date of Service: 2018    Discharge Recommendations:  [address], Continue to assess pending progress  OT Equipment Recommendations  Equipment Needed: No    Patient Diagnosis(es): The primary encounter diagnosis was Altered mental status, unspecified altered mental status type. Diagnoses of MAYITO (acute kidney injury) ([facility] [address] and Anemia, unspecified type were also pertinent to this visit. has a past medical history of Arthritis; Dementia; Hypertension; and Spinal stenosis. has a past surgical history that includes Rotator cuff repair (Left); Appendectomy; Colonoscopy; Endoscopy, colon, diagnostic; and eye surgery.       Restrictions  Restrictions/Precautions  Restrictions/Precautions: Fall Risk  Required Braces or Orthoses?: No  Position Activity Restriction  Other position/activity restrictions: history of dementia    Subjective   General  Patient assessed for rehabilitation services?: Yes  Family / Caregiver Present: Yes (aide present for portion of evaluation)  Pain Assessment  Patient Currently in Pain: Denies  Pain Assessment: 0-10     Social/Functional History  Social/Functional History  Lives With: Alone  Type of Home: House  Home Layout: One level  Home Access: Stairs to enter without rails  Entrance Stairs - Number of Steps: 2 SINCERE  Home Equipment: Rolling walker  Receives Help From: Family, Home health (Home health aide 4x/wk, family assists on other days )  ADL Assistance: Needs assistance  Homemaking Assistance: Needs assistance  Ambulation Assistance: Needs assistance (Per aide, pt

## 2018-12-19 NOTE — PROGRESS NOTES
Fair  Standing - Dynamic: -;Fair  Comments: with RW        Assessment   Body structures, Functions, Activity limitations: Decreased functional mobility ; Decreased strength;Decreased cognition;Decreased balance  Assessment: Pt ambulated 10ftx2 with RW and ModA, very unsteady with forward posture and decreased safety awareness when ambulating. Pt very unsafe ambulating and unsafe to return home at this time. Prognosis: Good  Decision Making: Medium Complexity  Patient Education: PT POC  REQUIRES PT FOLLOW UP: Yes  Activity Tolerance  Activity Tolerance: Patient limited by fatigue         Plan   Plan  Times per week: 5-6x/wk  Current Treatment Recommendations: Strengthening, ROM, Balance Training, Functional Mobility Training, Transfer Training, Gait Training, Endurance Training  Safety Devices  Type of devices: Call light within reach, Gait belt, Left in bed, Nurse notified, Bed alarm in place    G-Code  PT G-Codes  Functional Assessment Tool Used: [place]  Functional Limitation: Mobility: Walking and moving around  Mobility: Walking and Moving Around Current Status (): At least 60 percent but less than 80 percent impaired, limited or restricted  Mobility: Walking and Moving Around Goal Status ():  At least 20 percent but less than 40 percent impaired, limited or restricted  OutComes Score                                                  AM-PAC Score  AM-PAC Inpatient Mobility Raw Score : 11  AM-PAC Inpatient T-Scale Score : 33.86  Mobility Inpatient CMS 0-100% Score: 72.57  Mobility Inpatient CMS G-Code Modifier : CL          Goals  Short term goals  Time Frame for Short term goals: 14 visits  Short term goal 1: Pt to ambulate 50ft with RW and CGA  Short term goal 2: Pt to tolerate at least 30mins of UE/LE exercises in order to increase overall strength  Short term goal 3: Pt to perform bed mobility and transfers with CGA  Short term goal 4: Pt to have increased dynamic standing balance to Good minus with RW in order to decrease fall risk        Therapy Time   Individual Concurrent Group Co-treatment   Time In 1338         Time Out 1405         Minutes [facility] [facility], [address] [place] [address]

## 2018-12-19 NOTE — H&P
Axis 60 degrees    T Axis 55 degrees   Venous Blood Gas, POC    Collection Time: 12/18/18  6:50 PM   Result Value Ref Range    pH, Senthil 7.338 7.320 - 7.430    pCO2, Senthil 35.8 (L) 41.0 - 51.0 mm Hg    pO2, Senthil 34.1 30.0 - 50.0 mm Hg    HCO3, Venous 19.2 (L) 22.0 - 29.0 mmol/L    Total CO2, Venous 20 (L) 23.0 - 30.0 mmol/L    Negative Base Excess, Senthil 6 (H) 0.0 - 2.0    Positive Base Excess, Senthil NOT REPORTED 0.0 - 3.0    O2 Sat, Senthil 62 60.0 - 85.0 %    O2 Device/Flow/% NOT REPORTED     Sheldon Test NOT REPORTED     Sample Site NOT REPORTED     Mode NOT REPORTED     FIO2 NOT REPORTED     Pt Temp NOT REPORTED     POC pH Temp NOT REPORTED     POC pCO2 Temp NOT REPORTED mm Hg    POC pO2 Temp NOT REPORTED mm Hg   Hemoglobin and hematocrit, blood    Collection Time: 12/18/18  6:50 PM   Result Value Ref Range    POC Hemoglobin 10.0 (L) 13.5 - 17.5 g/dL    POC Hematocrit 29 (L) 41 - 53 %   Creatinine W/GFR Point of Care    Collection Time: 12/18/18  6:50 PM   Result Value Ref Range    POC Creatinine 2.01 (H) 0.51 - 1.19 mg/dL    GFR Comment 39 (L) >60 mL/min    GFR Non- 32 (L) >60 mL/min    GFR Comment         SODIUM (POC)    Collection Time: 12/18/18  6:50 PM   Result Value Ref Range    POC Sodium 145 138 - 146 mmol/L   POTASSIUM (POC)    Collection Time: 12/18/18  6:50 PM   Result Value Ref Range    POC Potassium 4.5 3.5 - 4.5 mmol/L   CHLORIDE (POC)    Collection Time: 12/18/18  6:50 PM   Result Value Ref Range    POC Chloride 119 (H) 98 - 107 mmol/L   CALCIUM, IONIC (POC)    Collection Time: 12/18/18  6:50 PM   Result Value Ref Range    POC Ionized Calcium 1.13 (L) 1.15 - 1.33 mmol/L   Lactic Acid, POC    Collection Time: 12/18/18  6:50 PM   Result Value Ref Range    POC Lactic Acid 1.76 (H) 0.56 - 1.39 mmol/L   POCT Glucose    Collection Time: 12/18/18  6:50 PM   Result Value Ref Range    POC Glucose 120 (H) 74 - 100 mg/dL   Anion Gap (Calc) POC    Collection Time: 12/18/18  6:50 PM   Result Value Ref Range 90.3 82.6 - 102.9 fL    MCH 28.1 25.2 - 33.5 pg    MCHC 31.1 28.4 - 34.8 g/dL    RDW 16.0 (H) 11.8 - 14.4 %    Platelets 350 238 - 091 k/uL    MPV 8.7 8.1 - 13.5 fL    NRBC Automated 0.0 0.0 per 100 WBC   Protime-INR    Collection Time: 12/19/18  5:19 AM   Result Value Ref Range    Protime 10.5 9.0 - 12.0 sec    INR 1.0        Imaging/Diagnostics:    Us Renal Complete    Result Date: 12/19/2018  EXAMINATION: RETROPERITONEAL ULTRASOUND OF THE KIDNEYS AND URINARY BLADDER 12/19/2018 COMPARISON: None HISTORY: ORDERING SYSTEM PROVIDED HISTORY: ARF TECHNOLOGIST PROVIDED HISTORY: US RETROPERITONEAL COMPLETE ARF 42-year-old male with acute renal failure FINDINGS: Kidneys: Right kidney measures 11.1 x 5.8 x 5.7 cm. Right renal cortical thickness measures 1.6 cm. Mild bilateral hydronephrosis is present. Preservation of the bilateral corticomedullary differentiation. Left kidney measures 10.8 x 6.7 x 5.5 cm. Left renal cortical thickness measures 1.4 cm. Color flow projects over the bilateral renal parenchyma. No hydroureter is evident. No echogenic foci with posterior acoustic shadowing to suggest renal calculi. Bladder: Urinary bladder measures 10.6 x 9.5 x 13.5 cm for a urinary bladder volume of 945.4 mL. Patient has difficulty urinating and therefore no postvoid urinary bladder volume was obtained. 1. Mild bilateral hydronephrosis. 2. Patient has difficulty urinating and therefore no postvoid urinary bladder volume was obtained. The findings were sent to the Radiology Results [address] at 8:18 am on 12/19/2018to be communicated to a licensed caregiver. Xr Chest Portable    Result Date: 12/18/2018  EXAMINATION: SINGLE XRAY VIEW OF THE CHEST 12/18/2018 7:24 pm COMPARISON: None. HISTORY: ORDERING SYSTEM PROVIDED HISTORY: AMS TECHNOLOGIST PROVIDED HISTORY: AMS FINDINGS: Cardiac monitoring leads overlie the chest.  Heart is not enlarged. Calcified aortic knob. Bibasilar atelectasis. Low lung volumes. No focal airspace consolidation. Low lung volumes with bibasilar atelectasis. Assessment :      Primary Problem  MAYITO (acute kidney injury) [facility])    Active Hospital Problems    Diagnosis Date Noted    Stage 3 chronic kidney disease ([facility] [address] [N18.3] 12/19/2018    Anemia due to stage 3 chronic kidney disease ([facility] [address] [N18.3, D63.1] 12/19/2018    MAYITO (acute kidney injury) ([facility] [address] [N17.9] 12/19/2018    Dementia [F03.90] 12/19/2018    Essential hypertension [I10] 12/19/2018    Arthritis [M19.90] 41/58/8554    Metabolic encephalopathy [P83.16] 12/18/2018       Plan:     Patient status Admit as inpatient in the  Med/Surge    - Continue IVF  - Monitor creatinine  - Monitor intake/output  - Follow up cultures  - Hold abx - s/p dose vanc/zosyn in ER  - PT/OT evaluation  -  consult   - Neuro checks       Consultations:   IP CONSULT TO HOSPITALIST     Patient is admitted as inpatient status because of co-morbidities listed above, severity of signs and symptoms as outlined, requirement for current medical therapies and most importantly because of direct risk to patient if care not provided in a hospital setting.     Prakash Flores MD  12/19/2018  9:17 AM    Copy sent to Dr. Ginger Jackson MD

## 2018-12-20 LAB
ANION GAP SERPL CALCULATED.3IONS-SCNC: 14 MMOL/L (ref 9–17)
BUN BLDV-MCNC: 40 MG/DL (ref 8–23)
BUN/CREAT BLD: ABNORMAL (ref 9–20)
CALCIUM SERPL-MCNC: 8.6 MG/DL (ref 8.6–10.4)
CHLORIDE BLD-SCNC: 113 MMOL/L (ref 98–107)
CO2: 16 MMOL/L (ref 20–31)
CREAT SERPL-MCNC: 1.82 MG/DL (ref 0.7–1.2)
GFR AFRICAN AMERICAN: 44 ML/MIN
GFR NON-AFRICAN AMERICAN: 36 ML/MIN
GFR SERPL CREATININE-BSD FRML MDRD: ABNORMAL ML/MIN/{1.73_M2}
GFR SERPL CREATININE-BSD FRML MDRD: ABNORMAL ML/MIN/{1.73_M2}
GLUCOSE BLD-MCNC: 88 MG/DL (ref 70–99)
POTASSIUM SERPL-SCNC: 4.7 MMOL/L (ref 3.7–5.3)
SODIUM BLD-SCNC: 143 MMOL/L (ref 135–144)

## 2018-12-20 PROCEDURE — 6370000000 HC RX 637 (ALT 250 FOR IP): Performed by: NURSE PRACTITIONER

## 2018-12-20 PROCEDURE — 80048 BASIC METABOLIC PNL TOTAL CA: CPT

## 2018-12-20 PROCEDURE — 36415 COLL VENOUS BLD VENIPUNCTURE: CPT

## 2018-12-20 PROCEDURE — 96372 THER/PROPH/DIAG INJ SC/IM: CPT

## 2018-12-20 PROCEDURE — 6360000002 HC RX W HCPCS: Performed by: NURSE PRACTITIONER

## 2018-12-20 PROCEDURE — 99232 SBSQ HOSP IP/OBS MODERATE 35: CPT | Performed by: INTERNAL MEDICINE

## 2018-12-20 PROCEDURE — 97530 THERAPEUTIC ACTIVITIES: CPT

## 2018-12-20 PROCEDURE — 97110 THERAPEUTIC EXERCISES: CPT

## 2018-12-20 PROCEDURE — 1200000000 HC SEMI PRIVATE

## 2018-12-20 RX ADMIN — Medication 400 UNITS: at 09:35

## 2018-12-20 RX ADMIN — MIRTAZAPINE 30 MG: 30 TABLET, FILM COATED ORAL at 21:30

## 2018-12-20 RX ADMIN — HEPARIN SODIUM 5000 UNITS: 5000 INJECTION, SOLUTION INTRAVENOUS; SUBCUTANEOUS at 13:36

## 2018-12-20 RX ADMIN — HEPARIN SODIUM 5000 UNITS: 5000 INJECTION, SOLUTION INTRAVENOUS; SUBCUTANEOUS at 21:33

## 2018-12-20 RX ADMIN — HEPARIN SODIUM 5000 UNITS: 5000 INJECTION, SOLUTION INTRAVENOUS; SUBCUTANEOUS at 05:08

## 2018-12-20 RX ADMIN — TAMSULOSIN HYDROCHLORIDE 0.4 MG: 0.4 CAPSULE ORAL at 12:35

## 2018-12-20 RX ADMIN — LOSARTAN POTASSIUM 100 MG: 50 TABLET, FILM COATED ORAL at 09:34

## 2018-12-21 PROBLEM — E43 SEVERE MALNUTRITION (HCC): Status: ACTIVE | Noted: 2018-12-21

## 2018-12-21 LAB
-: NORMAL
AMORPHOUS: NORMAL
ANION GAP SERPL CALCULATED.3IONS-SCNC: 13 MMOL/L (ref 9–17)
BACTERIA: NORMAL
BILIRUBIN URINE: NEGATIVE
BUN BLDV-MCNC: 37 MG/DL (ref 8–23)
BUN/CREAT BLD: ABNORMAL (ref 9–20)
CALCIUM SERPL-MCNC: 8.4 MG/DL (ref 8.6–10.4)
CASTS UA: NORMAL /LPF (ref 0–8)
CHLORIDE BLD-SCNC: 115 MMOL/L (ref 98–107)
CO2: 17 MMOL/L (ref 20–31)
COLOR: YELLOW
COMMENT UA: ABNORMAL
CREAT SERPL-MCNC: 1.87 MG/DL (ref 0.7–1.2)
CRYSTALS, UA: NORMAL /HPF
EPITHELIAL CELLS UA: NORMAL /HPF (ref 0–5)
GFR AFRICAN AMERICAN: 43 ML/MIN
GFR NON-AFRICAN AMERICAN: 35 ML/MIN
GFR SERPL CREATININE-BSD FRML MDRD: ABNORMAL ML/MIN/{1.73_M2}
GFR SERPL CREATININE-BSD FRML MDRD: ABNORMAL ML/MIN/{1.73_M2}
GLUCOSE BLD-MCNC: 84 MG/DL (ref 70–99)
GLUCOSE URINE: NEGATIVE
KETONES, URINE: NEGATIVE
LEUKOCYTE ESTERASE, URINE: NEGATIVE
MUCUS: NORMAL
NITRITE, URINE: NEGATIVE
OTHER OBSERVATIONS UA: NORMAL
PH UA: 5 (ref 5–8)
POTASSIUM SERPL-SCNC: 4.7 MMOL/L (ref 3.7–5.3)
PROTEIN UA: ABNORMAL
RBC UA: NORMAL /HPF (ref 0–4)
RENAL EPITHELIAL, UA: NORMAL /HPF
SODIUM BLD-SCNC: 145 MMOL/L (ref 135–144)
SPECIFIC GRAVITY UA: 1.01 (ref 1–1.03)
TRICHOMONAS: NORMAL
TURBIDITY: CLEAR
URINE HGB: ABNORMAL
UROBILINOGEN, URINE: NORMAL
WBC UA: NORMAL /HPF (ref 0–5)
YEAST: NORMAL

## 2018-12-21 PROCEDURE — 6370000000 HC RX 637 (ALT 250 FOR IP): Performed by: NURSE PRACTITIONER

## 2018-12-21 PROCEDURE — 99232 SBSQ HOSP IP/OBS MODERATE 35: CPT | Performed by: INTERNAL MEDICINE

## 2018-12-21 PROCEDURE — 6370000000 HC RX 637 (ALT 250 FOR IP): Performed by: INTERNAL MEDICINE

## 2018-12-21 PROCEDURE — 80048 BASIC METABOLIC PNL TOTAL CA: CPT

## 2018-12-21 PROCEDURE — 51701 INSERT BLADDER CATHETER: CPT

## 2018-12-21 PROCEDURE — 6360000002 HC RX W HCPCS: Performed by: NURSE PRACTITIONER

## 2018-12-21 PROCEDURE — 97530 THERAPEUTIC ACTIVITIES: CPT

## 2018-12-21 PROCEDURE — 36415 COLL VENOUS BLD VENIPUNCTURE: CPT

## 2018-12-21 PROCEDURE — 51798 US URINE CAPACITY MEASURE: CPT

## 2018-12-21 PROCEDURE — 2580000003 HC RX 258: Performed by: NURSE PRACTITIONER

## 2018-12-21 PROCEDURE — 96372 THER/PROPH/DIAG INJ SC/IM: CPT

## 2018-12-21 PROCEDURE — 1200000000 HC SEMI PRIVATE

## 2018-12-21 PROCEDURE — 81001 URINALYSIS AUTO W/SCOPE: CPT

## 2018-12-21 RX ORDER — HYDROCHLOROTHIAZIDE 25 MG/1
12.5 TABLET ORAL DAILY
Status: DISCONTINUED | OUTPATIENT
Start: 2018-12-21 | End: 2018-12-24

## 2018-12-21 RX ADMIN — Medication 10 ML: at 21:32

## 2018-12-21 RX ADMIN — HYDROCHLOROTHIAZIDE 12.5 MG: 25 TABLET ORAL at 13:37

## 2018-12-21 RX ADMIN — LOSARTAN POTASSIUM 100 MG: 50 TABLET, FILM COATED ORAL at 13:44

## 2018-12-21 RX ADMIN — HEPARIN SODIUM 5000 UNITS: 5000 INJECTION, SOLUTION INTRAVENOUS; SUBCUTANEOUS at 06:36

## 2018-12-21 RX ADMIN — MIRTAZAPINE 30 MG: 30 TABLET, FILM COATED ORAL at 21:32

## 2018-12-21 RX ADMIN — HEPARIN SODIUM 5000 UNITS: 5000 INJECTION, SOLUTION INTRAVENOUS; SUBCUTANEOUS at 13:53

## 2018-12-21 RX ADMIN — Medication 400 UNITS: at 13:46

## 2018-12-21 RX ADMIN — TAMSULOSIN HYDROCHLORIDE 0.4 MG: 0.4 CAPSULE ORAL at 13:46

## 2018-12-21 RX ADMIN — DOCUSATE SODIUM 100 MG: 100 TABLET, FILM COATED ORAL at 21:32

## 2018-12-21 RX ADMIN — HEPARIN SODIUM 5000 UNITS: 5000 INJECTION, SOLUTION INTRAVENOUS; SUBCUTANEOUS at 21:32

## 2018-12-21 NOTE — PROGRESS NOTES
--   29*  36*  35*   GFRAA  34*   --   36*  44*  43*   CALCIUM  9.0   --   8.7  8.6  8.4*   TROPONINI  0.02  0.03   --    --    --      Recent Labs      12/18/18 1850 12/18/18   1857 12/19/18   0519   PROT   --   7.2  6.7   LABALBU   --   3.5  3.1*   AST   --   52*  74*   ALT   --   16  19   ALKPHOS   --   69  60   BILITOT   --   0.23*  0.32   AMMONIA   --   32   --    POCGLU  120*   --    --          Lab Results   Component Value Date/Time    SPECIAL L AC 10 12/18/2018 07:47 PM     Lab Results   Component Value Date/Time    CULTURE NO GROWTH 3 DAYS 12/18/2018 07:47 PM       Lab Results   Component Value Date    FIO2 NOT REPORTED 12/18/2018         Physical Examination:        General appearance:  alert, cooperative and no distress  Mental Status:  oriented to person, place and time and normal affect  Lungs:  clear to auscultation bilaterally, normal effort  Heart:  regular rate and rhythm  Abdomen:  soft, nontender, nondistended, normal bowel sounds  Extremities:  no edema, redness, tenderness in the calves  Skin:  no gross lesions, rashes, induration    Assessment:        Primary Problem  MAYITO (acute kidney injury) [facility])    PATRICIA/[address] Problems    Diagnosis Date Noted    Stage 3 chronic kidney disease ([facility] [address] [N18.3] 12/19/2018    Anemia due to stage 3 chronic kidney disease ([facility] [address] [N18.3, D63.1] 12/19/2018    MAYITO (acute kidney injury) ([facility] [address] [N17.9] 12/19/2018    Dementia [F03.90] 12/19/2018    Essential hypertension [I10] 12/19/2018    Arthritis [M19.90] 57/83/9330    Metabolic encephalopathy [A81.94] 12/18/2018       Plan:        - Discontinue IVF  - Monitor creatinine  - Dietary consult   - Monitor intake/output  - Follow up cultures  - PT/OT evaluation - recommending SNF  -  consult - working on placement      Tarun Estrada MD  12/21/2018  11:44 AM

## 2018-12-21 NOTE — PROGRESS NOTES
Nutrition Therapies:  · Oral Diet Orders: Renal   · Oral Diet intake:  (50% per pt)  · Oral Nutrition Supplement (ONS) Orders: None  · Anthropometric Measures:  · Ht: 5' 7.5\" (171.5 cm)   · Current Body Wt: 140 lb (63.5 kg)  · Admission Body Wt: 144 lb (65.3 kg)  · Usual Body Wt:  (220 lbs per pt)  · % Weight Change:  ,  80 lb wt loss x 2-3 mo per pt? 36.4%  · Ideal Body Wt: 151 lb (68.5 kg), % Ideal Body 93%  · BMI Classification: BMI 18.5 - 24.9 Normal Weight    Nutrition Interventions:   Modify current diet, Start ONS  Continued Inpatient Monitoring, Education not appropriate at this time    Nutrition Evaluation:   · Evaluation: Goals set   · Goals: Pt to consume >75% of meals/supplements     · Monitoring: Nutrition Progression, Meal Intake, Supplement Intake, Weight, Pertinent Labs      Electronically signed by Cherelle Amin RD, LD on 12/21/18 at 2:06 PM    Contact Number: [phone]

## 2018-12-21 NOTE — PROGRESS NOTES
Physical Therapy  Facility/Department: [facility]  Daily Treatment Note  NAME: Carl Nicolas : 1940  MRN: [id number]    Date of Service: 2018    Discharge Recommendations:  [address]        Patient Diagnosis(es): The primary encounter diagnosis was Altered mental status, unspecified altered mental status type. Diagnoses of MAYITO (acute kidney injury) ([address] and Anemia, unspecified type were also pertinent to this visit. has a past medical history of Arthritis; Dementia; Hypertension; and Spinal stenosis. has a past surgical history that includes Rotator cuff repair (Left); Appendectomy; Colonoscopy; Endoscopy, colon, diagnostic; and eye surgery. Restrictions  Restrictions/Precautions  Restrictions/Precautions: Fall Risk  Required Braces or Orthoses?: No  Position Activity Restriction  Other position/activity restrictions: History of dementia  Subjective   Per RN, patient okay for PT. Patient agreeable. States he is \"not well\", \"under the weather today\". Orientation  Orientation  Overall Orientation Status: Impaired  Orientation Level: Oriented to place;Oriented to situation;Oriented to person;Disoriented to time (pt knew president but not month/year)     Objective   Bed mobility  Rolling to Right: Jose  Bridging: ModA  Supine to Sit: Jose  Sit to Supine: Moderate assistance  Scooting: Minimal assistance  Transfers  Sit to Stand: Moderate Assistance  Stand to sit: Minimal Assistance  Comments: Multiple trials of STS from EOB, one trial required Jose.  Very narrow RAFAELA, generally off balance  Ambulation  Ambulation?: Yes  Ambulation 1  Surface: level tile  Device: Rolling Walker  Assistance: Minimal assistance  Quality of Gait: Very flexed posture, very short shuffling steps, slow jin, mild unsteadiness   Distance: 2ft fwd/retro x3 trials  Comments: Patient very shaking and difficulty advancing LE's, unsafe to attempt further distance without assistance Balance  Posture: Poor  Sitting - Static: Fair;+  Sitting - Dynamic: Fair  Standing - Static: Fair  Standing - Dynamic: -;Fair  Comments: Basic static and dynamic balance activities. Assessment   Body structures, Functions, Activity limitations: Decreased functional mobility ; Decreased strength;Decreased cognition;Decreased balance  Assessment: Patient is unsafe to return home. Recommending SNF placement to address deficits.    Prognosis: Good  REQUIRES PT FOLLOW UP: Yes  Activity Tolerance  Activity Tolerance: Patient limited by fatigue;Patient limited by endurance    Goals  Short term goals  Time Frame for Short term goals: 14 visits  Short term goal 1: Pt to ambulate 50ft with RW and CGA  Short term goal 2: Pt to tolerate at least 30mins of UE/LE exercises in order to increase overall strength  Short term goal 3: Pt to perform bed mobility and transfers with CGA  Short term goal 4: Pt to have increased dynamic standing balance to Good minus with RW in order to decrease fall risk      Plan    Plan  Times per week: 5-6x/wk  Current Treatment Recommendations: Strengthening, ROM, Balance Training, Functional Mobility Training, Transfer Training, Gait Training, Endurance Training  Safety Devices  Type of devices: Call light within reach, Gait belt, Left in bed, Nurse notified, Bed alarm in place   Therapy Time   Individual Concurrent Group Co-treatment   Time In  1540         Time Out  1600         Minutes  [address] [place] [address]

## 2018-12-21 NOTE — DISCHARGE INSTR - COC
and (confused at times)    IV Access:  - None    Nursing Mobility/ADLs:  Walking   Assisted  Transfer  Assisted  Bathing  Assisted  Dressing  Assisted  Toileting  Assisted  Feeding  Independent  Med [facility]  Independent  Med Delivery   whole    Wound Care Documentation and Therapy:        Elimination:  Continence:   · Bowel: Yes  · Bladder: Yes  Urinary Catheter: None   Colostomy/Ileostomy/Ileal Conduit: No       Date of Last BM: 12/24/18    Intake/Output Summary (Last 24 hours) at 12/21/18 1151  Last data filed at 12/21/18 3379   Gross per 24 hour   Intake             2893 ml   Output                0 ml   Net             2893 ml     I/O last 3 completed shifts: In: 2893 [P.O.:1200; I.V.:1693]  Out: -     Safety Concerns:     Sundowners [place] and At Risk for Falls    Impairments/Disabilities:      None    Nutrition Therapy:  Current Nutrition Therapy:   - Oral Diet:  General    Routes of Feeding: Oral  Liquids: No Restrictions  Daily Fluid Restriction: no  Last Modified Barium Swallow with Video (Video Swallowing Test): not done    Treatments at the Time of Hospital Discharge:   Respiratory Treatments: n/a  Oxygen Therapy:  is not on home oxygen therapy.   Ventilator:    - No ventilator support    Rehab Therapies: Physical Therapy and Occupational Therapy  Weight Bearing Status/Restrictions: No weight bearing restirctions  Other Medical Equipment (for information only, NOT a DME order):  walker  Other Treatments: bladder scan for urinary retention     Patient's personal belongings (please select all that are sent with patient):  None    RN SIGNATURE:  Electronically signed by Nuno Arteaga RN on 12/25/18 at 9:54 AM    CASE MANAGEMENT/SOCIAL WORK SECTION    Inpatient Status Date: 12/18/2018    Readmission Risk Assessment Score:  Readmission Risk              Risk of Unplanned Readmission:        15           Discharging to Facility/ Agency   · Name: [employer]   · Address:  · Phone:

## 2018-12-22 LAB
ANION GAP SERPL CALCULATED.3IONS-SCNC: 11 MMOL/L (ref 9–17)
BUN BLDV-MCNC: 38 MG/DL (ref 8–23)
BUN/CREAT BLD: ABNORMAL (ref 9–20)
CALCIUM SERPL-MCNC: 8.7 MG/DL (ref 8.6–10.4)
CHLORIDE BLD-SCNC: 113 MMOL/L (ref 98–107)
CO2: 20 MMOL/L (ref 20–31)
CREAT SERPL-MCNC: 1.95 MG/DL (ref 0.7–1.2)
GFR AFRICAN AMERICAN: 41 ML/MIN
GFR NON-AFRICAN AMERICAN: 33 ML/MIN
GFR SERPL CREATININE-BSD FRML MDRD: ABNORMAL ML/MIN/{1.73_M2}
GFR SERPL CREATININE-BSD FRML MDRD: ABNORMAL ML/MIN/{1.73_M2}
GLUCOSE BLD-MCNC: 104 MG/DL (ref 70–99)
POTASSIUM SERPL-SCNC: 4.6 MMOL/L (ref 3.7–5.3)
SODIUM BLD-SCNC: 144 MMOL/L (ref 135–144)

## 2018-12-22 PROCEDURE — 6370000000 HC RX 637 (ALT 250 FOR IP): Performed by: NURSE PRACTITIONER

## 2018-12-22 PROCEDURE — 6360000002 HC RX W HCPCS: Performed by: NURSE PRACTITIONER

## 2018-12-22 PROCEDURE — 99232 SBSQ HOSP IP/OBS MODERATE 35: CPT | Performed by: INTERNAL MEDICINE

## 2018-12-22 PROCEDURE — 2580000003 HC RX 258: Performed by: NURSE PRACTITIONER

## 2018-12-22 PROCEDURE — 97116 GAIT TRAINING THERAPY: CPT

## 2018-12-22 PROCEDURE — 80048 BASIC METABOLIC PNL TOTAL CA: CPT

## 2018-12-22 PROCEDURE — 6370000000 HC RX 637 (ALT 250 FOR IP): Performed by: INTERNAL MEDICINE

## 2018-12-22 PROCEDURE — 36415 COLL VENOUS BLD VENIPUNCTURE: CPT

## 2018-12-22 PROCEDURE — 2580000003 HC RX 258: Performed by: INTERNAL MEDICINE

## 2018-12-22 PROCEDURE — 94761 N-INVAS EAR/PLS OXIMETRY MLT: CPT

## 2018-12-22 PROCEDURE — 1200000000 HC SEMI PRIVATE

## 2018-12-22 PROCEDURE — 97110 THERAPEUTIC EXERCISES: CPT

## 2018-12-22 PROCEDURE — 96372 THER/PROPH/DIAG INJ SC/IM: CPT

## 2018-12-22 RX ORDER — SODIUM CHLORIDE 9 MG/ML
INJECTION, SOLUTION INTRAVENOUS CONTINUOUS
Status: DISCONTINUED | OUTPATIENT
Start: 2018-12-22 | End: 2018-12-24

## 2018-12-22 RX ADMIN — MIRTAZAPINE 30 MG: 30 TABLET, FILM COATED ORAL at 22:48

## 2018-12-22 RX ADMIN — SODIUM CHLORIDE: 9 INJECTION, SOLUTION INTRAVENOUS at 14:13

## 2018-12-22 RX ADMIN — HYDROCHLOROTHIAZIDE 12.5 MG: 25 TABLET ORAL at 08:39

## 2018-12-22 RX ADMIN — Medication 400 UNITS: at 08:39

## 2018-12-22 RX ADMIN — DOCUSATE SODIUM 100 MG: 100 TABLET, FILM COATED ORAL at 08:39

## 2018-12-22 RX ADMIN — HEPARIN SODIUM 5000 UNITS: 5000 INJECTION, SOLUTION INTRAVENOUS; SUBCUTANEOUS at 13:27

## 2018-12-22 RX ADMIN — HEPARIN SODIUM 5000 UNITS: 5000 INJECTION, SOLUTION INTRAVENOUS; SUBCUTANEOUS at 22:48

## 2018-12-22 RX ADMIN — Medication 10 ML: at 08:39

## 2018-12-22 RX ADMIN — LOSARTAN POTASSIUM 100 MG: 50 TABLET, FILM COATED ORAL at 08:40

## 2018-12-22 RX ADMIN — DOCUSATE SODIUM 100 MG: 100 TABLET, FILM COATED ORAL at 22:48

## 2018-12-22 RX ADMIN — TAMSULOSIN HYDROCHLORIDE 0.4 MG: 0.4 CAPSULE ORAL at 08:39

## 2018-12-22 ASSESSMENT — PAIN SCALES - GENERAL: PAINLEVEL_OUTOF10: 0

## 2018-12-22 NOTE — PROGRESS NOTES
Dr. Delaney Orellana made aware of little incontinence in briefs and voiding only 30 cc at a time x3 today  Bladder scan after standing to void 30 cc was >999  Straight cathed without difficulty and emptied 700 clear yellow urine which was sent for u/a  Continue to monitor voids

## 2018-12-22 NOTE — PLAN OF CARE
Problem: Falls - Risk of:  Goal: Will remain free from falls  Will remain free from falls    Outcome: Ongoing    Goal: Absence of physical injury  Absence of physical injury    Outcome: Ongoing      Problem: Risk for Impaired Skin Integrity  Goal: Tissue integrity - skin and mucous membranes  Structural intactness and normal physiological function of skin and  mucous membranes.    Outcome: Ongoing      Problem: Musculor/Skeletal Functional Status  Goal: Highest potential functional level  Outcome: Ongoing    Goal: Absence of falls  Outcome: Ongoing      Problem: Musculor/Skeletal Functional Status  Goal: Highest potential functional level  Outcome: Ongoing    Goal: Absence of falls  Outcome: Ongoing      Problem: Pain:  Goal: Pain level will decrease  Pain level will decrease   Outcome: Ongoing      Problem: Nutrition  Goal: Optimal nutrition therapy  Outcome: Ongoing

## 2018-12-23 ENCOUNTER — APPOINTMENT (OUTPATIENT)
Dept: GENERAL RADIOLOGY | Age: 78
DRG: 682 | End: 2018-12-23
Payer: MEDICARE

## 2018-12-23 LAB
ANION GAP SERPL CALCULATED.3IONS-SCNC: 12 MMOL/L (ref 9–17)
BUN BLDV-MCNC: 42 MG/DL (ref 8–23)
BUN/CREAT BLD: ABNORMAL (ref 9–20)
CALCIUM SERPL-MCNC: 8.5 MG/DL (ref 8.6–10.4)
CHLORIDE BLD-SCNC: 113 MMOL/L (ref 98–107)
CO2: 19 MMOL/L (ref 20–31)
CREAT SERPL-MCNC: 2.01 MG/DL (ref 0.7–1.2)
GFR AFRICAN AMERICAN: 39 ML/MIN
GFR NON-AFRICAN AMERICAN: 32 ML/MIN
GFR SERPL CREATININE-BSD FRML MDRD: ABNORMAL ML/MIN/{1.73_M2}
GFR SERPL CREATININE-BSD FRML MDRD: ABNORMAL ML/MIN/{1.73_M2}
GLUCOSE BLD-MCNC: 103 MG/DL (ref 70–99)
POTASSIUM SERPL-SCNC: 4.5 MMOL/L (ref 3.7–5.3)
SODIUM BLD-SCNC: 144 MMOL/L (ref 135–144)

## 2018-12-23 PROCEDURE — 6370000000 HC RX 637 (ALT 250 FOR IP): Performed by: NURSE PRACTITIONER

## 2018-12-23 PROCEDURE — 99232 SBSQ HOSP IP/OBS MODERATE 35: CPT | Performed by: INTERNAL MEDICINE

## 2018-12-23 PROCEDURE — 1200000000 HC SEMI PRIVATE

## 2018-12-23 PROCEDURE — 51798 US URINE CAPACITY MEASURE: CPT

## 2018-12-23 PROCEDURE — 51701 INSERT BLADDER CATHETER: CPT

## 2018-12-23 PROCEDURE — 96372 THER/PROPH/DIAG INJ SC/IM: CPT

## 2018-12-23 PROCEDURE — 6360000002 HC RX W HCPCS: Performed by: NURSE PRACTITIONER

## 2018-12-23 PROCEDURE — 80048 BASIC METABOLIC PNL TOTAL CA: CPT

## 2018-12-23 PROCEDURE — 6370000000 HC RX 637 (ALT 250 FOR IP): Performed by: INTERNAL MEDICINE

## 2018-12-23 PROCEDURE — 74018 RADEX ABDOMEN 1 VIEW: CPT

## 2018-12-23 PROCEDURE — 36415 COLL VENOUS BLD VENIPUNCTURE: CPT

## 2018-12-23 RX ADMIN — Medication 400 UNITS: at 08:20

## 2018-12-23 RX ADMIN — HEPARIN SODIUM 5000 UNITS: 5000 INJECTION, SOLUTION INTRAVENOUS; SUBCUTANEOUS at 23:08

## 2018-12-23 RX ADMIN — HEPARIN SODIUM 5000 UNITS: 5000 INJECTION, SOLUTION INTRAVENOUS; SUBCUTANEOUS at 17:20

## 2018-12-23 RX ADMIN — LOSARTAN POTASSIUM 100 MG: 50 TABLET, FILM COATED ORAL at 08:19

## 2018-12-23 RX ADMIN — MIRTAZAPINE 30 MG: 30 TABLET, FILM COATED ORAL at 23:08

## 2018-12-23 RX ADMIN — HEPARIN SODIUM 5000 UNITS: 5000 INJECTION, SOLUTION INTRAVENOUS; SUBCUTANEOUS at 08:23

## 2018-12-23 RX ADMIN — DOCUSATE SODIUM 100 MG: 100 TABLET, FILM COATED ORAL at 23:08

## 2018-12-23 RX ADMIN — DOCUSATE SODIUM 100 MG: 100 TABLET, FILM COATED ORAL at 08:20

## 2018-12-23 RX ADMIN — HYDROCHLOROTHIAZIDE 12.5 MG: 25 TABLET ORAL at 08:20

## 2018-12-23 RX ADMIN — TAMSULOSIN HYDROCHLORIDE 0.4 MG: 0.4 CAPSULE ORAL at 08:20

## 2018-12-23 NOTE — PLAN OF CARE
[facility]    Second Visit Note  For more detailed information please refer to the progress note of the day      12/23/2018    6:47 PM    Name:   Carl Nicolas MRN:     [id number]     Acct:      [de-identified]   Room:   2004/2004-01  IP Day:  5  Admit Date:  12/18/2018  6:39 PM    PCP:   Leslie Lucas MD  Code Status:  Full Code        Pt vitals were reviewed   New labs were reviewed   Patient was seen    Updated plan :     1. Patient noted to have urinary retention  2. Bladder scan > 999  3.  Urology consulted        Kameron Kim MD  12/23/2018  6:47 PM

## 2018-12-23 NOTE — PLAN OF CARE
Problem: Falls - Risk of:  Goal: Will remain free from falls  Will remain free from falls    Outcome: Ongoing    Goal: Absence of physical injury  Absence of physical injury    Outcome: Ongoing      Problem: Risk for Impaired Skin Integrity  Goal: Tissue integrity - skin and mucous membranes  Structural intactness and normal physiological function of skin and  mucous membranes.    Outcome: Ongoing      Problem: Musculor/Skeletal Functional Status  Goal: Highest potential functional level  Outcome: Ongoing    Goal: Absence of falls  Outcome: Ongoing

## 2018-12-23 NOTE — PROGRESS NOTES
CALCIUM  8.4*  8.7  8.5*     No results for input(s): PROT, LABALBU, LABA1C, U4MNTGR, N9DOCHT, FT4, TSH, AST, ALT, LDH, GGT, ALKPHOS, LABGGT, BILITOT, BILIDIR, AMMONIA, AMYLASE, LIPASE, LACTATE, CHOL, HDL, LDLCHOLESTEROL, CHOLHDLRATIO, TRIG, VLDL, JNO05SG, PHENYTOIN, PHENYF, URICACID, POCGLU in the last 72 hours.       Lab Results   Component Value Date/Time    SPECIAL L AC 10 12/18/2018 07:47 PM     Lab Results   Component Value Date/Time    CULTURE NO GROWTH 5 DAYS 12/18/2018 07:47 PM       Lab Results   Component Value Date    FIO2 NOT REPORTED 12/18/2018         Physical Examination:        General appearance:  alert, cooperative and no distress  Mental Status:  oriented to person, place and time and normal affect  Lungs:  clear to auscultation bilaterally, normal effort  Heart:  regular rate and rhythm  Abdomen:  soft, nontender, nondistended, normal bowel sounds  Extremities:  no edema, redness, tenderness in the calves  Skin:  no gross lesions, rashes, induration    Assessment:        Primary Problem  MAYITO (acute kidney injury) [facility])    C[address] Problems    Diagnosis Date Noted    Severe malnutrition ([address] [E43] 12/21/2018    Stage 3 chronic kidney disease ([address] [N18.3] 12/19/2018    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

## 2018-12-24 LAB
ANION GAP SERPL CALCULATED.3IONS-SCNC: 18 MMOL/L (ref 9–17)
BUN BLDV-MCNC: 38 MG/DL (ref 8–23)
BUN/CREAT BLD: ABNORMAL (ref 9–20)
CALCIUM SERPL-MCNC: 8.4 MG/DL (ref 8.6–10.4)
CHLORIDE BLD-SCNC: 111 MMOL/L (ref 98–107)
CO2: 21 MMOL/L (ref 20–31)
CREAT SERPL-MCNC: 1.76 MG/DL (ref 0.7–1.2)
CULTURE: NORMAL
CULTURE: NORMAL
GFR AFRICAN AMERICAN: 46 ML/MIN
GFR NON-AFRICAN AMERICAN: 38 ML/MIN
GFR SERPL CREATININE-BSD FRML MDRD: ABNORMAL ML/MIN/{1.73_M2}
GFR SERPL CREATININE-BSD FRML MDRD: ABNORMAL ML/MIN/{1.73_M2}
GLUCOSE BLD-MCNC: 93 MG/DL (ref 70–99)
Lab: NORMAL
Lab: NORMAL
POTASSIUM SERPL-SCNC: 4.4 MMOL/L (ref 3.7–5.3)
SODIUM BLD-SCNC: 150 MMOL/L (ref 135–144)
SPECIMEN DESCRIPTION: NORMAL
SPECIMEN DESCRIPTION: NORMAL
STATUS: NORMAL
STATUS: NORMAL

## 2018-12-24 PROCEDURE — 97530 THERAPEUTIC ACTIVITIES: CPT

## 2018-12-24 PROCEDURE — 6370000000 HC RX 637 (ALT 250 FOR IP): Performed by: INTERNAL MEDICINE

## 2018-12-24 PROCEDURE — 1200000000 HC SEMI PRIVATE

## 2018-12-24 PROCEDURE — 36415 COLL VENOUS BLD VENIPUNCTURE: CPT

## 2018-12-24 PROCEDURE — 97110 THERAPEUTIC EXERCISES: CPT

## 2018-12-24 PROCEDURE — 6370000000 HC RX 637 (ALT 250 FOR IP): Performed by: NURSE PRACTITIONER

## 2018-12-24 PROCEDURE — 96372 THER/PROPH/DIAG INJ SC/IM: CPT

## 2018-12-24 PROCEDURE — 51798 US URINE CAPACITY MEASURE: CPT

## 2018-12-24 PROCEDURE — 6360000002 HC RX W HCPCS: Performed by: NURSE PRACTITIONER

## 2018-12-24 PROCEDURE — 51701 INSERT BLADDER CATHETER: CPT

## 2018-12-24 PROCEDURE — 2580000003 HC RX 258: Performed by: NURSE PRACTITIONER

## 2018-12-24 PROCEDURE — 80048 BASIC METABOLIC PNL TOTAL CA: CPT

## 2018-12-24 PROCEDURE — 99232 SBSQ HOSP IP/OBS MODERATE 35: CPT | Performed by: FAMILY MEDICINE

## 2018-12-24 PROCEDURE — 97535 SELF CARE MNGMENT TRAINING: CPT

## 2018-12-24 RX ORDER — HYDROCHLOROTHIAZIDE 25 MG/1
25 TABLET ORAL DAILY
Status: DISCONTINUED | OUTPATIENT
Start: 2018-12-25 | End: 2018-12-26

## 2018-12-24 RX ADMIN — HYDROCHLOROTHIAZIDE 12.5 MG: 25 TABLET ORAL at 09:33

## 2018-12-24 RX ADMIN — MIRTAZAPINE 30 MG: 30 TABLET, FILM COATED ORAL at 20:50

## 2018-12-24 RX ADMIN — DOCUSATE SODIUM 100 MG: 100 TABLET, FILM COATED ORAL at 09:33

## 2018-12-24 RX ADMIN — HEPARIN SODIUM 5000 UNITS: 5000 INJECTION, SOLUTION INTRAVENOUS; SUBCUTANEOUS at 14:49

## 2018-12-24 RX ADMIN — HEPARIN SODIUM 5000 UNITS: 5000 INJECTION, SOLUTION INTRAVENOUS; SUBCUTANEOUS at 20:50

## 2018-12-24 RX ADMIN — TAMSULOSIN HYDROCHLORIDE 0.4 MG: 0.4 CAPSULE ORAL at 09:33

## 2018-12-24 RX ADMIN — Medication 10 ML: at 20:45

## 2018-12-24 RX ADMIN — Medication 400 UNITS: at 09:33

## 2018-12-24 RX ADMIN — LOSARTAN POTASSIUM 100 MG: 50 TABLET, FILM COATED ORAL at 09:33

## 2018-12-24 ASSESSMENT — PAIN DESCRIPTION - PAIN TYPE: TYPE: CHRONIC PAIN

## 2018-12-24 ASSESSMENT — PAIN SCALES - GENERAL: PAINLEVEL_OUTOF10: 6

## 2018-12-24 ASSESSMENT — PAIN DESCRIPTION - LOCATION: LOCATION: BACK

## 2018-12-24 ASSESSMENT — PAIN DESCRIPTION - ORIENTATION: ORIENTATION: LOWER

## 2018-12-24 ASSESSMENT — PAIN DESCRIPTION - DESCRIPTORS: DESCRIPTORS: ACHING

## 2018-12-24 NOTE — PROGRESS NOTES
Occupational Therapy  Facility/Department: [facility] CAR 2  Daily Treatment Note  NAME: Altaf Delong. : 1940  MRN: [id number]    Date of Service: 2018    Discharge Recommendations:  [address]       Patient Diagnosis(es): The primary encounter diagnosis was Altered mental status, unspecified altered mental status type. Diagnoses of MAYITO (acute kidney injury) ([facility] and Anemia, unspecified type were also pertinent to this visit. has a past medical history of Arthritis; Dementia; Hypertension; and Spinal stenosis. has a past surgical history that includes Rotator cuff repair (Left); Appendectomy; Colonoscopy; Endoscopy, colon, diagnostic; and eye surgery. Restrictions  Restrictions/Precautions  Restrictions/Precautions: Fall Risk  Required Braces or Orthoses?: No  Position Activity Restriction  Other position/activity restrictions: History of dementia, up with assist  Subjective   General  Patient assessed for rehabilitation services?: Yes  Family / Caregiver Present: No (sitter and telesitter)  Pain Assessment  Patient Currently in Pain: No  Vital Signs  Patient Currently in Pain: No   Orientation  Orientation  Overall Orientation Status: Impaired  Orientation Level: Oriented to person;Disoriented to place; Disoriented to time;Oriented to situation  Objective    ADL  Grooming: Setup;Minimal assistance; Increased time to complete  UE Bathing: Setup; Increased time to complete;Minimal assistance  LE Bathing: Setup; Increased time to complete;Minimal assistance  UE Dressing: Setup;Minimal assistance  LE Dressing: Setup;Stand by assistance  Additional Comments: pt completed ADLs seated in chair. Pt pleasant and motivated   Attendance  Participation: Active participation       Assessment   Performance deficits / Impairments: Decreased functional mobility ; Decreased endurance;Decreased ADL status; Decreased high-level IADLs;Decreased safe awareness;Decreased cognition  Prognosis:

## 2018-12-24 NOTE — PLAN OF CARE
Problem: Falls - Risk of:  Goal: Will remain free from falls  Will remain free from falls    Outcome: Met This Shift  Free of injury  Continue to monitor  Continue plan of care

## 2018-12-24 NOTE — PROGRESS NOTES
38*   CREATININE  1.95*  2.01*  1.76*   ANIONGAP  11  12  18*   LABGLOM  33*  32*  38*   GFRAA  41*  39*  46*   CALCIUM  8.7  8.5*  8.4*     No results for input(s): PROT, LABALBU, LABA1C, N2XVFUR, Q8IHUHV, FT4, TSH, AST, ALT, LDH, GGT, ALKPHOS, LABGGT, BILITOT, BILIDIR, AMMONIA, AMYLASE, LIPASE, LACTATE, CHOL, HDL, LDLCHOLESTEROL, CHOLHDLRATIO, TRIG, VLDL, XSL80SF, PHENYTOIN, PHENYF, URICACID, POCGLU in the last 72 hours.       Lab Results   Component Value Date/Time    SPECIAL L AC 10 12/18/2018 07:47 PM     Lab Results   Component Value Date/Time    CULTURE NO GROWTH 6 DAYS 12/18/2018 07:47 PM       Lab Results   Component Value Date    FIO2 NOT REPORTED 12/18/2018       Radiology:        Physical Examination:        General appearance:  alert, cooperative and no distress  Mental Status:  oriented to person, place and time and normal affect  Lungs:  clear to auscultation bilaterally, normal effort  Heart:  regular rate and rhythm, no murmur  Abdomen:  soft, nontender, nondistended, normal bowel sounds, no masses, hepatomegaly, splenomegaly  Extremities:  no edema, redness, tenderness in the calves  Skin:  no gross lesions, rashes, induration    Assessment:        Primary Problem  MAYITO (acute kidney injury) ([address]    PATRICIA/Mel[facility] [address] Problems    Diagnosis Date Noted    Severe malnutrition ([address] [E43] 12/21/2018    Stage 3 chronic kidney disease ([address] [N18.3] 12/19/2018    Anemia due to stage 3 chronic kidney disease ([address] [N18.3, D63.1] 12/19/2018    MAYITO (acute kidney injury) ([address] [N17.9] 12/19/2018    Dementia [F03.90] 12/19/2018    Essential hypertension [I10] 12/19/2018    Arthritis [M19.90] 33/90/3460    Metabolic encephalopathy [A93.38] 12/18/2018       Plan:         MAYITO: creatinine is improving, continue to avoid nephrotoxic agents    Hypernatremia, mild : switch the fluid to 1/2 NS, monitor NA    Urinary retention : straight cath q 6 hours for vol > 350 ml     Continue boost for the malnutrition PT/OT , await placement      Essential HTN: uncontrolled, stop NS, increase HCTZ to 25 mg     Continue other chronic meds    DVT  And GI PPx      Maude Hart MD  12/24/2018  2:16 PM

## 2018-12-24 NOTE — PROGRESS NOTES
return demo. Pt supine in bed upon arrival and retired to bedside chair following ambulation. Transfers  Sit to Stand: Minimal Assistance  Stand to sit: Minimal Assistance  Stand Pivot Transfers: Minimal Assistance  Comment: Pt demos significant posterior lean and narrow RAFAELA upon standing. Pt able to correct with min A and vc's  Ambulation  Ambulation?: Yes  Ambulation 1  Surface: level tile  Device: Rolling Walker  Assistance: Minimal assistance  Quality of Gait: flexed trunk, very unsteady, lacks L TKE, shuffling, narrow RAFAELA  Distance: 5ft  Comments: Pt pleasantly confused, however follows all commands. Stairs/Curb  Stairs?: No     Balance  Posture: Poor  Sitting - Static: Fair;+  Sitting - Dynamic: Fair  Standing - Static: Fair;-  Standing - Dynamic: Fair;-  Comments: Pt sat EOB ~7 minutes with SBA. Standing balance assessed with RW. Increased time spent statically standing prior to ambulation to improve balance, RAFAELA and posture. Exercises  Comments: Seated LE exercise program: Long Arc Quads, hip abduction/adduction, heel/toe raises, and marches. Reps: 20x AROM BLE with mod vc's for technique correction and full ROM. Manual calf and HS stretch performed bilaterally 60 seconds x2 to address lack of TKE during gait. Assessment   Body structures, Functions, Activity limitations: Decreased functional mobility ; Decreased strength;Decreased cognition;Decreased balance  Assessment: Pt ambulated 5ft with RW and Jose; mildly unsteady with forward posture; Pt unsafe to return home at this time.    Prognosis: Good  Patient Education: importance of mobility, purpose of PT, plan for tx  REQUIRES PT FOLLOW UP: Yes  Activity Tolerance  Activity Tolerance: Patient limited by endurance          Goals  Short term goals  Time Frame for Short term goals: 14 visits  Short term goal 1: Pt to ambulate 50ft with RW and CGA  Short term goal 2: Pt to tolerate at least 30mins of UE/LE exercises in order to increase overall

## 2018-12-24 NOTE — PROGRESS NOTES
Standard High Calorie Oral Supplement  · ONS intake: %  · Anthropometric Measures:  · Ht: 5' 5\" (165.1 cm)   · Current Body Wt: 169 lb (76.7 kg)  · Admission Body Wt: 144 lb (65.3 kg)  · Usual Body Wt:  (220 lbs per pt)  · % Weight Change:  ,  80 lb wt loss x 2-3 mo per pt (36.4%)?  - pt w/ 25 lb wt gain since admission, most likely r/t fluid  · Ideal Body Wt: 151 lb (68.5 kg), % Ideal Body 93%  · BMI Classification: BMI 25.0 - 29.9 Overweight    Nutrition Interventions:   Continue current diet, Continue current ONS  Continued Inpatient Monitoring, Education not appropriate at this time    Nutrition Evaluation:   · Evaluation: Goal achieved   · Goals: Pt to consume >75% of meals/supplements     · Monitoring: Nutrition Progression, Meal Intake, Supplement Intake, Weight, Pertinent Labs      Electronically signed by Nagi Goodwin RD, LD on 12/24/18 at 9:13 AM    Contact Number: [phone]

## 2018-12-24 NOTE — PROGRESS NOTES
catheterization every 6 hours. May adjust based on volumes  - Continue CIC at nursing facility.  Avoid Reid  - Cr 1.76 (2.01)  - Urinalysis 5-10 RBC, No WBC      Madeleine Blanc  Urology Resident, PGY3  8:01 AM 12/24/2018

## 2018-12-25 LAB
ANION GAP SERPL CALCULATED.3IONS-SCNC: 12 MMOL/L (ref 9–17)
BUN BLDV-MCNC: 42 MG/DL (ref 8–23)
BUN/CREAT BLD: ABNORMAL (ref 9–20)
CALCIUM SERPL-MCNC: 8.5 MG/DL (ref 8.6–10.4)
CHLORIDE BLD-SCNC: 111 MMOL/L (ref 98–107)
CO2: 22 MMOL/L (ref 20–31)
CREAT SERPL-MCNC: 1.78 MG/DL (ref 0.7–1.2)
GFR AFRICAN AMERICAN: 45 ML/MIN
GFR NON-AFRICAN AMERICAN: 37 ML/MIN
GFR SERPL CREATININE-BSD FRML MDRD: ABNORMAL ML/MIN/{1.73_M2}
GFR SERPL CREATININE-BSD FRML MDRD: ABNORMAL ML/MIN/{1.73_M2}
GLUCOSE BLD-MCNC: 89 MG/DL (ref 70–99)
POTASSIUM SERPL-SCNC: 4.7 MMOL/L (ref 3.7–5.3)
SODIUM BLD-SCNC: 145 MMOL/L (ref 135–144)

## 2018-12-25 PROCEDURE — 51798 US URINE CAPACITY MEASURE: CPT

## 2018-12-25 PROCEDURE — 6370000000 HC RX 637 (ALT 250 FOR IP): Performed by: NURSE PRACTITIONER

## 2018-12-25 PROCEDURE — 51701 INSERT BLADDER CATHETER: CPT

## 2018-12-25 PROCEDURE — 36415 COLL VENOUS BLD VENIPUNCTURE: CPT

## 2018-12-25 PROCEDURE — 6360000002 HC RX W HCPCS: Performed by: NURSE PRACTITIONER

## 2018-12-25 PROCEDURE — 6370000000 HC RX 637 (ALT 250 FOR IP): Performed by: FAMILY MEDICINE

## 2018-12-25 PROCEDURE — 1200000000 HC SEMI PRIVATE

## 2018-12-25 PROCEDURE — 96372 THER/PROPH/DIAG INJ SC/IM: CPT

## 2018-12-25 PROCEDURE — 2580000003 HC RX 258: Performed by: NURSE PRACTITIONER

## 2018-12-25 PROCEDURE — 80048 BASIC METABOLIC PNL TOTAL CA: CPT

## 2018-12-25 RX ORDER — HALOPERIDOL 5 MG/ML
2 INJECTION INTRAMUSCULAR EVERY 6 HOURS PRN
Status: DISCONTINUED | OUTPATIENT
Start: 2018-12-25 | End: 2018-12-26 | Stop reason: HOSPADM

## 2018-12-25 RX ORDER — HYDROCHLOROTHIAZIDE 25 MG/1
25 TABLET ORAL DAILY
Qty: 30 TABLET | Refills: 3 | Status: ON HOLD | OUTPATIENT
Start: 2018-12-25 | End: 2019-05-07 | Stop reason: HOSPADM

## 2018-12-25 RX ADMIN — HEPARIN SODIUM 5000 UNITS: 5000 INJECTION, SOLUTION INTRAVENOUS; SUBCUTANEOUS at 14:50

## 2018-12-25 RX ADMIN — DOCUSATE SODIUM 100 MG: 100 TABLET, FILM COATED ORAL at 09:40

## 2018-12-25 RX ADMIN — DOCUSATE SODIUM 100 MG: 100 TABLET, FILM COATED ORAL at 21:17

## 2018-12-25 RX ADMIN — Medication 10 ML: at 09:40

## 2018-12-25 RX ADMIN — HEPARIN SODIUM 5000 UNITS: 5000 INJECTION, SOLUTION INTRAVENOUS; SUBCUTANEOUS at 06:37

## 2018-12-25 RX ADMIN — Medication 10 ML: at 21:18

## 2018-12-25 RX ADMIN — HYDROCHLOROTHIAZIDE 25 MG: 25 TABLET ORAL at 09:40

## 2018-12-25 RX ADMIN — LOSARTAN POTASSIUM 100 MG: 50 TABLET, FILM COATED ORAL at 09:40

## 2018-12-25 RX ADMIN — MIRTAZAPINE 30 MG: 30 TABLET, FILM COATED ORAL at 21:17

## 2018-12-25 RX ADMIN — HEPARIN SODIUM 5000 UNITS: 5000 INJECTION, SOLUTION INTRAVENOUS; SUBCUTANEOUS at 21:30

## 2018-12-25 RX ADMIN — Medication 400 UNITS: at 09:40

## 2018-12-25 RX ADMIN — TAMSULOSIN HYDROCHLORIDE 0.4 MG: 0.4 CAPSULE ORAL at 09:40

## 2018-12-25 ASSESSMENT — PAIN SCALES - GENERAL: PAINLEVEL_OUTOF10: 0

## 2018-12-25 NOTE — PROGRESS NOTES
abdominal pain, constipation, diarrhea, nausea, vomiting  Neurological:  negative for dizziness, headache    Medications: Allergies: Allergies   Allergen Reactions    Lisinopril        Current Meds:   Scheduled Meds:    hydrochlorothiazide  25 mg Oral Daily    losartan  100 mg Oral Daily    mirtazapine  30 mg Oral Nightly    tamsulosin  0.4 mg Oral Daily    vitamin E  400 Units Oral Daily    sodium chloride flush  10 mL Intravenous 2 times per day    docusate sodium  100 mg Oral BID    heparin (porcine)  5,000 Units Subcutaneous 3 times per day     Continuous Infusions:   PRN Meds: sodium chloride flush, ondansetron, nicotine, acetaminophen    Data:     Past Medical History:   has a past medical history of Arthritis; Dementia; Hypertension; and Spinal stenosis. Social History:   reports that he has quit smoking. He has never used smokeless tobacco. He reports that he does not drink alcohol or use drugs. Family History: History reviewed. No pertinent family history. Vitals:  /68   Pulse 87   Temp 98.2 °F (36.8 °C) (Oral)   Resp 20   Ht 5' 5\" (1.651 m)   Wt 148 lb 3.2 oz (67.2 kg)   SpO2 99%   BMI 24.66 kg/m²   Temp (24hrs), Av.2 °F (36.8 °C), Min:98.1 °F (36.7 °C), Max:98.2 °F (36.8 °C)    No results for input(s): POCGLU in the last 72 hours. I/O (24Hr): Intake/Output Summary (Last 24 hours) at 18 0828  Last data filed at 18 6951   Gross per 24 hour   Intake             1820 ml   Output             2400 ml   Net             -580 ml       Labs:    Hematology:No results for input(s): WBC, RBC, HGB, HCT, MCV, MCH, MCHC, RDW, PLT, MPV, SEDRATE, CRP, INR, DDIMER, YO7NAOVJ, LABABSO in the last 72 hours.     Invalid input(s): PT  Chemistry:  Recent Labs      18   0602  18   0514  18   0454   NA  144  150*  145*   K  4.5  4.4  4.7   CL  113*  111*  111*   CO2  19*  21  22   GLUCOSE  103*  93  89   BUN  42*  38*  42*   CREATININE  2.01*  1.76*

## 2018-12-25 NOTE — CARE COORDINATION
Call received from nurse, Maciel Gallegos. Patient has guard at bedside.  Call to admissions at [facility] - he will need to be guard-free x 24 hours prior to admission, Gustavo Kebede has messaged admissions at [facility] to extend precert in am Wednesday
[address] spoke with Kei hernandez david she states pt is not current with them  Previous admit to this hospital 2011    Pt gave me permission to call his nephew Carolyn Mariscal he states pt lives by himself in one level  He has a private pay aid that comes out 3x a day   She helps him bathe, get dressed,his meals and reminds him to take his meds which they have set up in [address] and his wife area also over there frequently and assist with some meals to give the aide a break  Pt is at home overnight by himself  Pt was dx with dementia 2yrs ago but really hasn't progressed much  He has been in the [facility] in the past for  Few weeks after a bowel obstruction  Call Jodie Pedroza the nephew for anything you may need    16:34  Called left message for Sadia Frankel regarding PT OT eval and recommend SNF asked him to call back if they would like referral to [facility] or if they would like to look at a list.   Await call back      Electronically signed by Av Vásquez RN on 12/19/18 at 11:06 AM

## 2018-12-25 NOTE — DISCHARGE SUMMARY
MG capsule  Commonly known as:  FLOMAX     VITAMIN D2 PO     vitamin E 400 UNIT capsule        STOP taking these medications    traMADol 50 MG tablet  Commonly known as:  ULTRAM           Where to Get Your Medications      You can get these medications from any pharmacy    Bring a paper prescription for each of these medications  · hydrochlorothiazide 25 MG tablet         Time Spent on discharge is  33 mins in patient examination, evaluation, counseling as well as medication reconciliation, prescriptions for required medications, discharge plan and follow up. Electronically signed by   Brandon Benton MD  12/25/2018  9:27 AM      Thank you Dr. Edwyna Cowden, MD for the opportunity to be involved in this patient's care.

## 2018-12-25 NOTE — PLAN OF CARE
Problem: Falls - Risk of:  Goal: Absence of physical injury  Absence of physical injury    Outcome: Ongoing      Problem: Risk for Impaired Skin Integrity  Goal: Tissue integrity - skin and mucous membranes  Structural intactness and normal physiological function of skin and  mucous membranes.    Outcome: Ongoing      Problem: Musculor/Skeletal Functional Status  Goal: Highest potential functional level  Outcome: Ongoing    Goal: Absence of falls  Outcome: Ongoing

## 2018-12-26 VITALS
SYSTOLIC BLOOD PRESSURE: 145 MMHG | BODY MASS INDEX: 24.21 KG/M2 | DIASTOLIC BLOOD PRESSURE: 82 MMHG | OXYGEN SATURATION: 100 % | TEMPERATURE: 98.1 F | HEART RATE: 74 BPM | HEIGHT: 65 IN | WEIGHT: 145.3 LBS | RESPIRATION RATE: 14 BRPM

## 2018-12-26 LAB
-: NORMAL
ANION GAP SERPL CALCULATED.3IONS-SCNC: 12 MMOL/L (ref 9–17)
BUN BLDV-MCNC: 49 MG/DL (ref 8–23)
BUN/CREAT BLD: ABNORMAL (ref 9–20)
CALCIUM SERPL-MCNC: 8.8 MG/DL (ref 8.6–10.4)
CHLORIDE BLD-SCNC: 107 MMOL/L (ref 98–107)
CO2: 24 MMOL/L (ref 20–31)
CREAT SERPL-MCNC: 2.09 MG/DL (ref 0.7–1.2)
GFR AFRICAN AMERICAN: 37 ML/MIN
GFR NON-AFRICAN AMERICAN: 31 ML/MIN
GFR SERPL CREATININE-BSD FRML MDRD: ABNORMAL ML/MIN/{1.73_M2}
GFR SERPL CREATININE-BSD FRML MDRD: ABNORMAL ML/MIN/{1.73_M2}
GLUCOSE BLD-MCNC: 101 MG/DL (ref 70–99)
POTASSIUM SERPL-SCNC: 4.8 MMOL/L (ref 3.7–5.3)
REASON FOR REJECTION: NORMAL
SODIUM BLD-SCNC: 143 MMOL/L (ref 135–144)
ZZ NTE CLEAN UP: ORDERED TEST: NORMAL
ZZ NTE WITH NAME CLEAN UP: SPECIMEN SOURCE: NORMAL

## 2018-12-26 PROCEDURE — 94761 N-INVAS EAR/PLS OXIMETRY MLT: CPT

## 2018-12-26 PROCEDURE — 97535 SELF CARE MNGMENT TRAINING: CPT

## 2018-12-26 PROCEDURE — 80048 BASIC METABOLIC PNL TOTAL CA: CPT

## 2018-12-26 PROCEDURE — 96372 THER/PROPH/DIAG INJ SC/IM: CPT

## 2018-12-26 PROCEDURE — 6370000000 HC RX 637 (ALT 250 FOR IP): Performed by: NURSE PRACTITIONER

## 2018-12-26 PROCEDURE — 51701 INSERT BLADDER CATHETER: CPT

## 2018-12-26 PROCEDURE — 97110 THERAPEUTIC EXERCISES: CPT

## 2018-12-26 PROCEDURE — 99239 HOSP IP/OBS DSCHRG MGMT >30: CPT | Performed by: FAMILY MEDICINE

## 2018-12-26 PROCEDURE — 36415 COLL VENOUS BLD VENIPUNCTURE: CPT

## 2018-12-26 PROCEDURE — 6360000002 HC RX W HCPCS: Performed by: NURSE PRACTITIONER

## 2018-12-26 PROCEDURE — 51798 US URINE CAPACITY MEASURE: CPT

## 2018-12-26 PROCEDURE — 2580000003 HC RX 258: Performed by: NURSE PRACTITIONER

## 2018-12-26 PROCEDURE — 97116 GAIT TRAINING THERAPY: CPT

## 2018-12-26 RX ADMIN — DOCUSATE SODIUM 100 MG: 100 TABLET, FILM COATED ORAL at 11:45

## 2018-12-26 RX ADMIN — TAMSULOSIN HYDROCHLORIDE 0.4 MG: 0.4 CAPSULE ORAL at 11:45

## 2018-12-26 RX ADMIN — Medication 10 ML: at 11:45

## 2018-12-26 RX ADMIN — HEPARIN SODIUM 5000 UNITS: 5000 INJECTION, SOLUTION INTRAVENOUS; SUBCUTANEOUS at 06:22

## 2018-12-26 RX ADMIN — Medication 400 UNITS: at 11:46

## 2018-12-26 NOTE — PROGRESS NOTES
Physical Therapy  Facility/Department: [facility] CAR 2  Daily Treatment Note  NAME: Thanh Baldwin : 1940  MRN: [id number]    Date of Service: 2018    Discharge Recommendations:  Subacute/Skilled Nursing Facility   PT Equipment Recommendations  Equipment Needed: No    Patient Diagnosis(es): The primary encounter diagnosis was Altered mental status, unspecified altered mental status type. Diagnoses of MAYITO (acute kidney injury) ([facility] and Anemia, unspecified type were also pertinent to this visit. has a past medical history of Arthritis; Dementia; Hypertension; and Spinal stenosis. has a past surgical history that includes Rotator cuff repair (Left); Appendectomy; Colonoscopy; Endoscopy, colon, diagnostic; and eye surgery. Restrictions  Restrictions/Precautions  Restrictions/Precautions: Fall Risk  Required Braces or Orthoses?: No  Position Activity Restriction  Other position/activity restrictions: History of dementia, up with assist  Subjective   General  Response To Previous Treatment: Patient with no complaints from previous session. Family / Caregiver Present: No  Subjective  Subjective: RN and pt agreeable to PT. Pt very pleasant and cooperative throughout.    Pain Screening  Patient Currently in Pain: No  Vital Signs  Patient Currently in Pain: No       Orientation  Orientation  Overall Orientation Status: Impaired  Orientation Level: Disoriented to time  Cognition      Objective   Bed mobility  Rolling to Left: Minimal assistance  Rolling to Right: Minimal assistance  Supine to Sit: Minimal assistance (Trunk and LE support)  Scooting: Minimal assistance  Comment: HOB raised, cues for sequencing  Transfers  Sit to Stand: Minimal Assistance  Stand to sit: Minimal Assistance  Bed to Chair: Minimal assistance  Comment: Post lean upon standing  Ambulation  Ambulation?: Yes  Ambulation 1  Surface: level tile  Device: Rolling Walker  Assistance: Minimal assistance  Quality of Gait: Flexed trunk, narrow RAFAELA, difficulty advancing R LE  Distance: 30 ft  Comments: Pt pleasantly confused, however follows all commands. Stairs/Curb  Stairs?: No     Balance  Posture: Fair  Sitting - Static: Good  Sitting - Dynamic: Fair;+  Standing - Static: Fair;-  Standing - Dynamic: Fair;-  Comments: Pt sat EOB ~10 min, SBA. Exercise  Seated LE exercise program: Long Arc Quads, hip abduction/adduction, heel/toe raises, and marches. Reps: 15x, cues to stay on task                       Assessment   Body structures, Functions, Activity limitations: Decreased functional mobility ; Decreased strength;Decreased cognition;Decreased balance  Assessment: Pt ambulated 30ft with RW and PRISCA/CGA; mildly unsteady with forward posture; Pt unsafe to return home at this time.    Prognosis: Good  REQUIRES PT FOLLOW UP: Yes  Activity Tolerance  Activity Tolerance: Patient limited by endurance        Goals  Short term goals  Time Frame for Short term goals: 14 visits  Short term goal 1: Pt to ambulate 50ft with RW and CGA  Short term goal 2: Pt to tolerate at least 30mins of UE/LE exercises in order to increase overall strength  Short term goal 3: Pt to perform bed mobility and transfers with CGA  Short term goal 4: Pt to have increased dynamic standing balance to Good minus with RW in order to decrease fall risk     Plan    Plan  Times per week: 5-6x/wk  Current Treatment Recommendations: Strengthening, ROM, Balance Training, Functional Mobility Training, Transfer Training, Gait Training, Endurance Training  Safety Devices  Type of devices: Gait belt, Call light within reach, Nurse notified, Chair alarm in place, Sitter present, Left in chair  Restraints  Initially in place: No     Therapy Time   Individual Concurrent Group Co-treatment   Time In 0830         Time Out 0858         Minutes [address]

## 2018-12-26 NOTE — FLOWSHEET NOTE
12/26/18 1813   Discharge Event   Discharged with Documented Belongings Yes   Departure Mode Family member   Mobility at [employer]   Discharged to [address] [place] [address]   Time of Discharge [phone]

## 2018-12-26 NOTE — PROGRESS NOTES
Occupational Therapy  Facility/Department: [facility] CAR 2  Daily Treatment Note  NAME: Herrera Smith. : 1940  MRN: [id number]    Date of Service: 2018    Discharge Recommendations:  [address]       Patient Diagnosis(es): The primary encounter diagnosis was Altered mental status, unspecified altered mental status type. Diagnoses of MAYITO (acute kidney injury) ([address] and Anemia, unspecified type were also pertinent to this visit. has a past medical history of Arthritis; Dementia; Hypertension; and Spinal stenosis. has a past surgical history that includes Rotator cuff repair (Left); Appendectomy; Colonoscopy; Endoscopy, colon, diagnostic; and eye surgery. Restrictions  Restrictions/Precautions  Restrictions/Precautions: Fall Risk  Required Braces or Orthoses?: No  Position Activity Restriction  Other position/activity restrictions: History of dementia, up with assist  Subjective   General  Patient assessed for rehabilitation services?: Yes  Family / Caregiver Present: No  Pain Assessment  Patient Currently in Pain: Denies  Vital Signs  Patient Currently in Pain: Denies   Orientation  Orientation  Overall Orientation Status: Impaired  Orientation Level: Oriented to person;Oriented to time;Disoriented to situation;Disoriented to place (Pt able to state being in [place] and in a medical facility.)  Objective    ADL  Grooming: Setup;Verbal cueing;Stand by assistance (Oral care standing at sink.)  UE Bathing: Setup; Increased time to complete;Verbal cueing;Contact guard assistance (Max A for back.)  LE Bathing: Setup;Verbal cueing; Increased time to complete;Contact guard assistance (Pericare/bottom care standing at sink.)  UE Dressing: Minimal assistance (To manage gown.)  LE Dressing: Contact guard assistance (To doff/mick socks seated in chair.)  Toileting: Contact guard assistance  Additional Comments: Pt completed ADL care standing at sink.   Balance  Sitting Balance: Supervision (Seated in chair/toilet.)  Standing Balance: Contact guard assistance  Standing Balance  Time: 20 minutes  Activity: Functional mobility to/from bathroom using RW, toilet transfer, ADL care standing at sink. Sit to stand: Minimal assistance  Stand to sit: Contact guard assistance  Comment: Verbal/tactile cues to use rocking motion to complete sit/stand transfer with good return. Functional Mobility  Functional - Mobility Device: Rolling Walker  Activity: To/from bathroom  Assist Level: Contact guard assistance  Functional Mobility Comments: Pt displayed continuous forward flexed position throughout standing activities. Verbal cues for walker placement when completing turns with fair return. Toilet Transfers  Toilet - Technique: Ambulating  Equipment Used: Grab bars  Toilet Transfer: Contact guard assistance  Transfers  Sit to stand: Minimal assistance  Stand to sit: Contact guard assistance  Cognition  Overall Cognitive Status: Exceptions  Memory: Decreased recall of recent events  Problem Solving: Assistance required to identify errors made;Assistance required to correct errors made  Initiation: Requires cues for some  Sequencing: Requires cues for some  Cognition Comment: Per chart, hx of dementia      Assessment   Performance deficits / Impairments: Decreased functional mobility ; Decreased endurance;Decreased ADL status; Decreased high-level IADLs;Decreased safe awareness;Decreased cognition  Prognosis: Good  Patient Education: OT POC, transfer/walker safety, EC/WS - pt verbalized understanding. REQUIRES OT FOLLOW UP: Yes  Activity Tolerance  Activity Tolerance: Patient Tolerated treatment well  Safety Devices  Safety Devices in place: Yes  Type of devices: Call light within reach; Chair alarm in place; Patient at risk for falls; Left in chair;Nurse notified          Plan   Plan  Times per week: 4-5x/wk   Current Treatment Recommendations: Functional Mobility Training, Endurance Training, Safety Education &

## 2019-01-07 LAB
ANION GAP SERPL CALCULATED.3IONS-SCNC: NORMAL MMOL/L (ref 9–17)
BUN BLDV-MCNC: NORMAL MG/DL (ref 8–23)
BUN/CREAT BLD: NORMAL (ref 9–20)
CALCIUM SERPL-MCNC: NORMAL MG/DL (ref 8.6–10.4)
CHLORIDE BLD-SCNC: NORMAL MMOL/L (ref 98–107)
CO2: NORMAL MMOL/L (ref 20–31)
CREAT SERPL-MCNC: NORMAL MG/DL (ref 0.7–1.2)
GFR AFRICAN AMERICAN: NORMAL ML/MIN
GFR NON-AFRICAN AMERICAN: NORMAL ML/MIN
GFR SERPL CREATININE-BSD FRML MDRD: NORMAL ML/MIN/{1.73_M2}
GFR SERPL CREATININE-BSD FRML MDRD: NORMAL ML/MIN/{1.73_M2}
GLUCOSE BLD-MCNC: NORMAL MG/DL (ref 70–99)
POTASSIUM SERPL-SCNC: NORMAL MMOL/L (ref 3.7–5.3)
SODIUM BLD-SCNC: NORMAL MMOL/L (ref 135–144)

## 2019-04-28 ENCOUNTER — APPOINTMENT (OUTPATIENT)
Dept: CT IMAGING | Age: 79
DRG: 469 | End: 2019-04-28
Payer: MEDICARE

## 2019-04-28 ENCOUNTER — HOSPITAL ENCOUNTER (INPATIENT)
Age: 79
LOS: 10 days | Discharge: NURSING, REHAB OR LONG-TERM CARE | DRG: 469 | End: 2019-05-08
Attending: EMERGENCY MEDICINE | Admitting: INTERNAL MEDICINE
Payer: MEDICARE

## 2019-04-28 ENCOUNTER — APPOINTMENT (OUTPATIENT)
Dept: GENERAL RADIOLOGY | Age: 79
DRG: 469 | End: 2019-04-28
Payer: MEDICARE

## 2019-04-28 DIAGNOSIS — S72.002A CLOSED DISPLACED FRACTURE OF LEFT FEMORAL NECK (HCC): Primary | ICD-10-CM

## 2019-04-28 DIAGNOSIS — F02.80 DEMENTIA ASSOCIATED WITH OTHER UNDERLYING DISEASE WITHOUT BEHAVIORAL DISTURBANCE (HCC): ICD-10-CM

## 2019-04-28 DIAGNOSIS — N18.30 STAGE 3 CHRONIC KIDNEY DISEASE (HCC): ICD-10-CM

## 2019-04-28 DIAGNOSIS — S01.81XA FACIAL LACERATION, INITIAL ENCOUNTER: ICD-10-CM

## 2019-04-28 PROBLEM — S72.009A CLOSED FRACTURE OF NECK OF FEMUR (HCC): Status: ACTIVE | Noted: 2019-04-28

## 2019-04-28 PROCEDURE — 73502 X-RAY EXAM HIP UNI 2-3 VIEWS: CPT

## 2019-04-28 PROCEDURE — 99285 EMERGENCY DEPT VISIT HI MDM: CPT

## 2019-04-28 PROCEDURE — 70450 CT HEAD/BRAIN W/O DYE: CPT

## 2019-04-28 PROCEDURE — 1200000000 HC SEMI PRIVATE

## 2019-04-28 PROCEDURE — 73700 CT LOWER EXTREMITY W/O DYE: CPT

## 2019-04-28 RX ORDER — ASPIRIN 81 MG/1
81 TABLET, CHEWABLE ORAL DAILY
COMMUNITY

## 2019-04-28 RX ORDER — LEVOFLOXACIN 500 MG/1
500 TABLET, FILM COATED ORAL DAILY
COMMUNITY
End: 2019-06-13 | Stop reason: ALTCHOICE

## 2019-04-28 RX ORDER — ACETAMINOPHEN 325 MG/1
650 TABLET ORAL EVERY 4 HOURS PRN
COMMUNITY

## 2019-04-28 ASSESSMENT — ENCOUNTER SYMPTOMS
NAUSEA: 0
VOMITING: 0
COUGH: 0

## 2019-04-28 ASSESSMENT — PAIN DESCRIPTION - PAIN TYPE: TYPE: ACUTE PAIN

## 2019-04-28 ASSESSMENT — PAIN DESCRIPTION - LOCATION: LOCATION: BACK
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
## 2019-04-29 PROBLEM — E87.0 HYPERNATREMIA: Status: ACTIVE | Noted: 2019-04-29

## 2019-04-29 PROBLEM — I49.9 CARDIAC ARRHYTHMIA: Status: ACTIVE | Noted: 2019-04-29

## 2019-04-29 LAB
ANION GAP SERPL CALCULATED.3IONS-SCNC: 13 MMOL/L (ref 9–17)
ANION GAP SERPL CALCULATED.3IONS-SCNC: 14 MMOL/L (ref 9–17)
BUN BLDV-MCNC: 50 MG/DL (ref 8–23)
BUN BLDV-MCNC: 53 MG/DL (ref 8–23)
BUN/CREAT BLD: ABNORMAL (ref 9–20)
BUN/CREAT BLD: ABNORMAL (ref 9–20)
CALCIUM SERPL-MCNC: 8.9 MG/DL (ref 8.6–10.4)
CALCIUM SERPL-MCNC: 9.2 MG/DL (ref 8.6–10.4)
CHLORIDE BLD-SCNC: 124 MMOL/L (ref 98–107)
CHLORIDE BLD-SCNC: 128 MMOL/L (ref 98–107)
CO2: 16 MMOL/L (ref 20–31)
CO2: 20 MMOL/L (ref 20–31)
CREAT SERPL-MCNC: 1.85 MG/DL (ref 0.7–1.2)
CREAT SERPL-MCNC: 2.21 MG/DL (ref 0.7–1.2)
GFR AFRICAN AMERICAN: 35 ML/MIN
GFR AFRICAN AMERICAN: 43 ML/MIN
GFR NON-AFRICAN AMERICAN: 29 ML/MIN
GFR NON-AFRICAN AMERICAN: 35 ML/MIN
GFR SERPL CREATININE-BSD FRML MDRD: ABNORMAL ML/MIN/{1.73_M2}
GLUCOSE BLD-MCNC: 78 MG/DL (ref 70–99)
GLUCOSE BLD-MCNC: 81 MG/DL (ref 70–99)
HCT VFR BLD CALC: 28.5 % (ref 40.7–50.3)
HEMOGLOBIN: 8.4 G/DL (ref 13–17)
INR BLD: 0.9
MCH RBC QN AUTO: 27.1 PG (ref 25.2–33.5)
MCHC RBC AUTO-ENTMCNC: 29.5 G/DL (ref 28.4–34.8)
MCV RBC AUTO: 91.9 FL (ref 82.6–102.9)
MYOGLOBIN: 216 NG/ML (ref 28–72)
NRBC AUTOMATED: 0 PER 100 WBC
PDW BLD-RTO: 18.7 % (ref 11.8–14.4)
PLATELET # BLD: 324 K/UL (ref 138–453)
PMV BLD AUTO: 8.8 FL (ref 8.1–13.5)
POTASSIUM SERPL-SCNC: 5.2 MMOL/L (ref 3.7–5.3)
POTASSIUM SERPL-SCNC: 5.2 MMOL/L (ref 3.7–5.3)
PROTHROMBIN TIME: 9.9 SEC (ref 9–12)
RBC # BLD: 3.1 M/UL (ref 4.21–5.77)
SODIUM BLD-SCNC: 157 MMOL/L (ref 135–144)
SODIUM BLD-SCNC: 158 MMOL/L (ref 135–144)
TROPONIN INTERP: ABNORMAL
TROPONIN INTERP: ABNORMAL
TROPONIN T: ABNORMAL NG/ML
TROPONIN T: ABNORMAL NG/ML
TROPONIN, HIGH SENSITIVITY: 100 NG/L (ref 0–22)
TROPONIN, HIGH SENSITIVITY: 101 NG/L (ref 0–22)
VITAMIN D 25-HYDROXY: 28.6 NG/ML (ref 30–100)
WBC # BLD: 4.4 K/UL (ref 3.5–11.3)

## 2019-04-29 PROCEDURE — 80048 BASIC METABOLIC PNL TOTAL CA: CPT

## 2019-04-29 PROCEDURE — 85610 PROTHROMBIN TIME: CPT

## 2019-04-29 PROCEDURE — 1200000000 HC SEMI PRIVATE

## 2019-04-29 PROCEDURE — 93005 ELECTROCARDIOGRAM TRACING: CPT

## 2019-04-29 PROCEDURE — 2580000003 HC RX 258: Performed by: STUDENT IN AN ORGANIZED HEALTH CARE EDUCATION/TRAINING PROGRAM

## 2019-04-29 PROCEDURE — 99223 1ST HOSP IP/OBS HIGH 75: CPT | Performed by: INTERNAL MEDICINE

## 2019-04-29 PROCEDURE — 6370000000 HC RX 637 (ALT 250 FOR IP): Performed by: NURSE PRACTITIONER

## 2019-04-29 PROCEDURE — 85027 COMPLETE CBC AUTOMATED: CPT

## 2019-04-29 PROCEDURE — 2580000003 HC RX 258: Performed by: INTERNAL MEDICINE

## 2019-04-29 PROCEDURE — 2580000003 HC RX 258: Performed by: NURSE PRACTITIONER

## 2019-04-29 PROCEDURE — 84484 ASSAY OF TROPONIN QUANT: CPT

## 2019-04-29 PROCEDURE — 36415 COLL VENOUS BLD VENIPUNCTURE: CPT

## 2019-04-29 PROCEDURE — 82306 VITAMIN D 25 HYDROXY: CPT

## 2019-04-29 PROCEDURE — 83874 ASSAY OF MYOGLOBIN: CPT

## 2019-04-29 RX ORDER — OXYCODONE HYDROCHLORIDE AND ACETAMINOPHEN 5; 325 MG/1; MG/1
2 TABLET ORAL EVERY 4 HOURS PRN
Status: DISCONTINUED | OUTPATIENT
Start: 2019-04-29 | End: 2019-05-08 | Stop reason: HOSPADM

## 2019-04-29 RX ORDER — POTASSIUM CHLORIDE 20 MEQ/1
40 TABLET, EXTENDED RELEASE ORAL PRN
Status: DISCONTINUED | OUTPATIENT
Start: 2019-04-29 | End: 2019-05-08 | Stop reason: HOSPADM

## 2019-04-29 RX ORDER — MORPHINE SULFATE 2 MG/ML
2 INJECTION, SOLUTION INTRAMUSCULAR; INTRAVENOUS
Status: DISCONTINUED | OUTPATIENT
Start: 2019-04-29 | End: 2019-05-08 | Stop reason: HOSPADM

## 2019-04-29 RX ORDER — DEXTROSE AND SODIUM CHLORIDE 5; .33 G/100ML; G/100ML
INJECTION, SOLUTION INTRAVENOUS CONTINUOUS
Status: DISCONTINUED | OUTPATIENT
Start: 2019-04-29 | End: 2019-04-30

## 2019-04-29 RX ORDER — DOCUSATE SODIUM 100 MG/1
100 CAPSULE, LIQUID FILLED ORAL NIGHTLY
Status: DISCONTINUED | OUTPATIENT
Start: 2019-04-29 | End: 2019-05-08 | Stop reason: HOSPADM

## 2019-04-29 RX ORDER — SODIUM CHLORIDE 0.9 % (FLUSH) 0.9 %
10 SYRINGE (ML) INJECTION PRN
Status: DISCONTINUED | OUTPATIENT
Start: 2019-04-29 | End: 2019-05-08 | Stop reason: HOSPADM

## 2019-04-29 RX ORDER — SODIUM CHLORIDE 450 MG/100ML
INJECTION, SOLUTION INTRAVENOUS CONTINUOUS
Status: DISCONTINUED | OUTPATIENT
Start: 2019-04-29 | End: 2019-04-29

## 2019-04-29 RX ORDER — TAMSULOSIN HYDROCHLORIDE 0.4 MG/1
0.4 CAPSULE ORAL DAILY
Status: DISCONTINUED | OUTPATIENT
Start: 2019-04-29 | End: 2019-05-08 | Stop reason: HOSPADM

## 2019-04-29 RX ORDER — MORPHINE SULFATE 4 MG/ML
4 INJECTION, SOLUTION INTRAMUSCULAR; INTRAVENOUS
Status: DISCONTINUED | OUTPATIENT
Start: 2019-04-29 | End: 2019-05-08 | Stop reason: HOSPADM

## 2019-04-29 RX ORDER — OXYCODONE HYDROCHLORIDE AND ACETAMINOPHEN 5; 325 MG/1; MG/1
1 TABLET ORAL EVERY 4 HOURS PRN
Status: DISCONTINUED | OUTPATIENT
Start: 2019-04-29 | End: 2019-05-08 | Stop reason: HOSPADM

## 2019-04-29 RX ORDER — SODIUM CHLORIDE 450 MG/100ML
INJECTION, SOLUTION INTRAVENOUS CONTINUOUS
Status: DISCONTINUED | OUTPATIENT
Start: 2019-04-29 | End: 2019-04-29 | Stop reason: ALTCHOICE

## 2019-04-29 RX ORDER — NICOTINE 21 MG/24HR
1 PATCH, TRANSDERMAL 24 HOURS TRANSDERMAL DAILY PRN
Status: DISCONTINUED | OUTPATIENT
Start: 2019-04-29 | End: 2019-05-08 | Stop reason: HOSPADM

## 2019-04-29 RX ORDER — SODIUM CHLORIDE 9 MG/ML
INJECTION, SOLUTION INTRAVENOUS CONTINUOUS
Status: DISCONTINUED | OUTPATIENT
Start: 2019-04-29 | End: 2019-04-29

## 2019-04-29 RX ORDER — ACETAMINOPHEN 325 MG/1
650 TABLET ORAL EVERY 4 HOURS PRN
Status: DISCONTINUED | OUTPATIENT
Start: 2019-04-29 | End: 2019-05-08 | Stop reason: HOSPADM

## 2019-04-29 RX ORDER — MIRTAZAPINE 30 MG/1
30 TABLET, FILM COATED ORAL NIGHTLY
Status: DISCONTINUED | OUTPATIENT
Start: 2019-04-29 | End: 2019-05-08 | Stop reason: HOSPADM

## 2019-04-29 RX ORDER — 0.9 % SODIUM CHLORIDE 0.9 %
250 INTRAVENOUS SOLUTION INTRAVENOUS ONCE
Status: COMPLETED | OUTPATIENT
Start: 2019-04-29 | End: 2019-04-29

## 2019-04-29 RX ORDER — HYDROCHLOROTHIAZIDE 25 MG/1
25 TABLET ORAL DAILY
Status: DISCONTINUED | OUTPATIENT
Start: 2019-04-29 | End: 2019-04-29

## 2019-04-29 RX ORDER — SODIUM CHLORIDE 0.9 % (FLUSH) 0.9 %
10 SYRINGE (ML) INJECTION EVERY 12 HOURS SCHEDULED
Status: DISCONTINUED | OUTPATIENT
Start: 2019-04-29 | End: 2019-05-08 | Stop reason: HOSPADM

## 2019-04-29 RX ORDER — VITAMIN E 268 MG
400 CAPSULE ORAL DAILY
Status: DISCONTINUED | OUTPATIENT
Start: 2019-04-29 | End: 2019-04-29

## 2019-04-29 RX ORDER — ONDANSETRON 2 MG/ML
4 INJECTION INTRAMUSCULAR; INTRAVENOUS EVERY 6 HOURS PRN
Status: DISCONTINUED | OUTPATIENT
Start: 2019-04-29 | End: 2019-05-08 | Stop reason: HOSPADM

## 2019-04-29 RX ORDER — ACETAMINOPHEN 325 MG/1
650 TABLET ORAL EVERY 4 HOURS PRN
Status: DISCONTINUED | OUTPATIENT
Start: 2019-04-29 | End: 2019-04-29

## 2019-04-29 RX ORDER — MAGNESIUM SULFATE 1 G/100ML
1 INJECTION INTRAVENOUS PRN
Status: DISCONTINUED | OUTPATIENT
Start: 2019-04-29 | End: 2019-05-08 | Stop reason: HOSPADM

## 2019-04-29 RX ORDER — LOSARTAN POTASSIUM 50 MG/1
100 TABLET ORAL DAILY
Status: DISCONTINUED | OUTPATIENT
Start: 2019-04-29 | End: 2019-04-29

## 2019-04-29 RX ORDER — POTASSIUM CHLORIDE 7.45 MG/ML
10 INJECTION INTRAVENOUS PRN
Status: DISCONTINUED | OUTPATIENT
Start: 2019-04-29 | End: 2019-05-08 | Stop reason: HOSPADM

## 2019-04-29 RX ADMIN — LOSARTAN POTASSIUM 100 MG: 50 TABLET, FILM COATED ORAL at 08:30

## 2019-04-29 RX ADMIN — SODIUM CHLORIDE: 9 INJECTION, SOLUTION INTRAVENOUS at 05:38

## 2019-04-29 RX ADMIN — MIRTAZAPINE 30 MG: 30 TABLET, FILM COATED ORAL at 23:28

## 2019-04-29 RX ADMIN — DOCUSATE SODIUM 100 MG: 100 CAPSULE, LIQUID FILLED ORAL at 23:29

## 2019-04-29 RX ADMIN — SODIUM CHLORIDE: 9 INJECTION, SOLUTION INTRAVENOUS at 14:45

## 2019-04-29 RX ADMIN — DEXTROSE AND SODIUM CHLORIDE: 5; 330 INJECTION, SOLUTION INTRAVENOUS at 18:54

## 2019-04-29 RX ADMIN — SODIUM CHLORIDE: 4.5 INJECTION, SOLUTION INTRAVENOUS at 11:32

## 2019-04-29 RX ADMIN — SODIUM CHLORIDE 250 ML: 9 INJECTION, SOLUTION INTRAVENOUS at 14:45

## 2019-04-29 RX ADMIN — TAMSULOSIN HYDROCHLORIDE 0.4 MG: 0.4 CAPSULE ORAL at 08:30

## 2019-04-29 ASSESSMENT — PAIN SCALES - GENERAL
PAINLEVEL_OUTOF10: 0
PAINLEVEL_OUTOF10: 0

## 2019-04-29 NOTE — PLAN OF CARE
Problem: Falls - Risk of:  Goal: Will remain free from falls  Description  Will remain free from falls  4/29/2019 1313 by Trudi Smallwood RN  Outcome: Ongoing  4/29/2019 0743 by Mckenzie Callaway RN  Outcome: Ongoing  Goal: Absence of physical injury  Description  Absence of physical injury  4/29/2019 1313 by Trudi Smallwood RN  Outcome: Ongoing  4/29/2019 0743 by Mckenzie Callaway RN  Outcome: Ongoing     Problem: Risk for Impaired Skin Integrity  Goal: Tissue integrity - skin and mucous membranes  Description  Structural intactness and normal physiological function of skin and  mucous membranes.   4/29/2019 1313 by Trudi Smallwood RN  Outcome: Ongoing  4/29/2019 0743 by Mckenzie Callaway RN  Outcome: Ongoing     Problem: Confusion - Acute:  Goal: Absence of continued neurological deterioration signs and symptoms  Description  Absence of continued neurological deterioration signs and symptoms  4/29/2019 1313 by Trudi Smallwood RN  Outcome: Ongoing  4/29/2019 0743 by Mckenzie Callaway RN  Outcome: Ongoing  Goal: Mental status will be restored to baseline  Description  Mental status will be restored to baseline  4/29/2019 1313 by Trudi Smallwood RN  Outcome: Ongoing  4/29/2019 0743 by Mckenzie Callaway RN  Outcome: Ongoing     Problem: Discharge Planning:  Goal: Ability to perform activities of daily living will improve  Description  Ability to perform activities of daily living will improve  4/29/2019 1313 by Trudi Smallwood RN  Outcome: Ongoing  4/29/2019 0743 by Mckenzie Callaway RN  Outcome: Ongoing  Goal: Participates in care planning  Description  Participates in care planning  4/29/2019 1313 by Trudi Smallwood RN  Outcome: Ongoing  4/29/2019 0743 by Mckenzie Callaway RN  Outcome: Ongoing     Problem: Injury - Risk of, Physical Injury:  Goal: Will remain free from falls  Description  Will remain free from falls  4/29/2019 1313 by Trudi Smallwood RN  Outcome: Ongoing  4/29/2019 0743 by Mckenzie Callaway RN  Outcome: Ongoing  Goal: Absence of physical injury  Description  Absence of physical injury  4/29/2019 1313 by Kahlil Frankel RN  Outcome: Ongoing  4/29/2019 0743 by Behzad Morgan RN  Outcome: Ongoing     Problem: Mood - Altered:  Goal: Mood stable  Description  Mood stable  4/29/2019 1313 by Kahlil Frankel RN  Outcome: Ongoing  4/29/2019 0743 by Behzad Moragn RN  Outcome: Ongoing  Goal: Absence of abusive behavior  Description  Absence of abusive behavior  4/29/2019 1313 by Kahlil Frankel RN  Outcome: Ongoing  4/29/2019 0743 by Behzad Morgan RN  Outcome: Ongoing  Goal: Verbalizations of feeling emotionally comfortable while being cared for will increase  Description  Verbalizations of feeling emotionally comfortable while being cared for will increase  4/29/2019 1313 by Kahlil Frankel RN  Outcome: Ongoing  4/29/2019 0743 by Behzad Morgan RN  Outcome: Ongoing     Problem: Psychomotor Activity - Altered:  Goal: Absence of psychomotor disturbance signs and symptoms  Description  Absence of psychomotor disturbance signs and symptoms  4/29/2019 1313 by Kahlil Frankel RN  Outcome: Ongoing  4/29/2019 0743 by Behzad Morgan RN  Outcome: Ongoing     Problem: Sensory Perception - Impaired:  Goal: Demonstrations of improved sensory functioning will increase  Description  Demonstrations of improved sensory functioning will increase  4/29/2019 1313 by Kahlil Frankel RN  Outcome: Ongoing  4/29/2019 0743 by Behzad Morgan RN  Outcome: Ongoing  Goal: Decrease in sensory misperception frequency  Description  Decrease in sensory misperception frequency  4/29/2019 1313 by Kahlil Frankel RN  Outcome: Ongoing  4/29/2019 0743 by Behzad Morgan RN  Outcome: Ongoing  Goal: Able to refrain from responding to false sensory perceptions  Description  Able to refrain from responding to false sensory perceptions  4/29/2019 1313 by Kahlil Frankel RN  Outcome: Ongoing  4/29/2019 0743 by Giulia Danielson Ca Saucedo RN  Outcome: Ongoing  Goal: Demonstrates accurate environmental perceptions  Description  Demonstrates accurate environmental perceptions  4/29/2019 1313 by Larisa Stevenson RN  Outcome: Ongoing  4/29/2019 0743 by Joenathan Kawasaki, RN  Outcome: Ongoing  Goal: Able to distinguish between reality-based and nonreality-based thinking  Description  Able to distinguish between reality-based and nonreality-based thinking  4/29/2019 1313 by Larisa Stevenson RN  Outcome: Ongoing  4/29/2019 0743 by Joenathan Kawasaki, RN  Outcome: Ongoing  Goal: Able to interrupt nonreality-based thinking  Description  Able to interrupt nonreality-based thinking  4/29/2019 1313 by Larisa Stevenson RN  Outcome: Ongoing  4/29/2019 0743 by Joenathan Kawasaki, RN  Outcome: Ongoing     Problem: Sleep Pattern Disturbance:  Goal: Appears well-rested  Description  Appears well-rested  4/29/2019 1313 by Larisa Stevenson RN  Outcome: Ongoing  4/29/2019 0743 by Joenathan Kawasaki, RN  Outcome: Ongoing

## 2019-04-29 NOTE — PROGRESS NOTES
Received phone consent for surgery by phone from ba George who makes his medical decisions consent signed and witnessed by 2 RNS

## 2019-04-29 NOTE — ED NOTES
Patient resting on cart, nondistressed  GCS=15    Awaiting further plan of care    Will continue to monitor    Call light within reach     Lillie Lee RN  04/28/19 0285

## 2019-04-29 NOTE — ED NOTES
Warm blanket applied for comfort.     Repositioned on cart for comfort, call light within reach    Updated on plan of care    Will continue to monitor    Report given to [facility] TRISTIN TEAGUE, RN  04/28/19 7978

## 2019-04-29 NOTE — PROGRESS NOTES
Smoking Cessation - topics covered   []  Health Risks  []  Benefits of Quitting   []  Smoking Cessation  [x]  Patient has no history of tobacco use  []  Patient is former smoker. [x]  No need for tobacco cessation education. []  Booklet given  []  Patient verbalizes understanding. []  Patient denies need for tobacco cessation education. []  Unable to meet with patient today. Will follow up as able.   Sincere Felton  7:58 AM

## 2019-04-29 NOTE — PLAN OF CARE
Problem: Falls - Risk of:  Goal: Will remain free from falls  Description  Will remain free from falls  Outcome: Ongoing  Goal: Absence of physical injury  Description  Absence of physical injury  Outcome: Ongoing     Problem: Risk for Impaired Skin Integrity  Goal: Tissue integrity - skin and mucous membranes  Description  Structural intactness and normal physiological function of skin and  mucous membranes.   Outcome: Ongoing     Problem: Confusion - Acute:  Goal: Absence of continued neurological deterioration signs and symptoms  Description  Absence of continued neurological deterioration signs and symptoms  Outcome: Ongoing  Goal: Mental status will be restored to baseline  Description  Mental status will be restored to baseline  Outcome: Ongoing     Problem: Discharge Planning:  Goal: Ability to perform activities of daily living will improve  Description  Ability to perform activities of daily living will improve  Outcome: Ongoing  Goal: Participates in care planning  Description  Participates in care planning  Outcome: Ongoing     Problem: Injury - Risk of, Physical Injury:  Goal: Will remain free from falls  Description  Will remain free from falls  Outcome: Ongoing  Goal: Absence of physical injury  Description  Absence of physical injury  Outcome: Ongoing     Problem: Mood - Altered:  Goal: Mood stable  Description  Mood stable  Outcome: Ongoing  Goal: Absence of abusive behavior  Description  Absence of abusive behavior  Outcome: Ongoing  Goal: Verbalizations of feeling emotionally comfortable while being cared for will increase  Description  Verbalizations of feeling emotionally comfortable while being cared for will increase  Outcome: Ongoing     Problem: Psychomotor Activity - Altered:  Goal: Absence of psychomotor disturbance signs and symptoms  Description  Absence of psychomotor disturbance signs and symptoms  Outcome: Ongoing     Problem: Sensory Perception - Impaired:  Goal: Demonstrations of improved sensory functioning will increase  Description  Demonstrations of improved sensory functioning will increase  Outcome: Ongoing  Goal: Decrease in sensory misperception frequency  Description  Decrease in sensory misperception frequency  Outcome: Ongoing  Goal: Able to refrain from responding to false sensory perceptions  Description  Able to refrain from responding to false sensory perceptions  Outcome: Ongoing  Goal: Demonstrates accurate environmental perceptions  Description  Demonstrates accurate environmental perceptions  Outcome: Ongoing  Goal: Able to distinguish between reality-based and nonreality-based thinking  Description  Able to distinguish between reality-based and nonreality-based thinking  Outcome: Ongoing  Goal: Able to interrupt nonreality-based thinking  Description  Able to interrupt nonreality-based thinking  Outcome: Ongoing     Problem: Sleep Pattern Disturbance:  Goal: Appears well-rested  Description  Appears well-rested  Outcome: Ongoing

## 2019-04-29 NOTE — CARE COORDINATION
Case Management Initial Discharge Plan  Emily Webber,             Met with:family member patient and nephew Christina Villareal [phone] to discuss discharge plans. Information verified: address, contacts, phone number, , insurance Yes  PCP: Stephanie Castellanos MD  Date of last visit: few months    Insurance Provider: [employer]    Discharge Planning    Living Arrangements:  Alone, Family Members, Other (Comment)   Support Systems:  Family Members    Home has 1 stories  1 stairs to climb to get into front door,   Location of bedroom/bathroom in home     Patient able to perform ADL's:Assisted nephew states pt lives alone however They have private pay HHA and RN that cover the day. Christina Villareal his nephew is typically with him overnight. He was diagnosed with dementia about 2 yrs ago. He uses a walker at home. He had a fall at home hit his head and had a cut over his eye brow went to [facility] and then has been in [facility] for about one week. Christina Villareal is currently in [facility] but can be reached by phone [phone] or email [email or web address]. He thinks he would like pt to go back to [facility] but will look on line for what else might be available and will let me know. Updated Susannah Rosado from [facility] about pt's admission.     Current Services (outpatient & in home) private pay RN and HHA  DME equipment: walker at home  DME provider:     Pharmacy:    Potential Assistance Purchasing Medications:  No  Does patient want to participate in local refill/ meds to beds program?  Yes    Potential Assistance Needed:  Matthew Jacobsuel    Patient agreeable to home care: No  [place] of choice provided:  n/a    Prior SNF/Rehab Placement and Facility: [facility]  Agreeable to SNF/Rehab: Yes  [place] of choice provided: yes   Evaluation: no    Expected Discharge date:  19  Patient expects to be discharged to:  [facility]  Follow Up Appointment: Amy Mendoza

## 2019-04-29 NOTE — ED NOTES
No phone call or report was received from [facility]  Medication lists and face sheet only information sent with patient             Pavithra Quiroz RN  04/28/19 5343

## 2019-04-29 NOTE — ED NOTES
Patient brought into ER per EMS from [facility] with c/o L sided facial laceration s/p fall when he states that he was up and stuck head on bed rail  Patient denies LOC. Reported by EMS that patient stays in dementia unit at [facility]  Patient reports that his \"neighbor\" called out for help s/p patients fall    Patient speaking in full complete sentences, non distressed.   Denies chest pain or sob  Denies dizziness  +p/m/s/s x4  Reports that he is having back and L sided hip pain, unknown if related to fall    Nondistressed  GCS=15  VS stable    Call light within reach  Updated on plan of care and processes  Denies complaints at this time  Will continue to monitor       Clyde Mendoza RN  04/28/19 1746

## 2019-04-29 NOTE — CONSULTS
Orthopedic Surgery Consult  (Dr. Milagro Gallegos)                   CC/Reason for consult:  Left hip pain    HPI:    The patient is a 78 y.o. male patient presents as a transfer from [facility] to [facility] ED after a fall. He did not lose consciousness. He was transferred to [facility] for further care on 4/29. Patient complains of left hip pain. He denies any other complaints or pains at this time. Denies numbness, tingling, fevers, chills, shortness of breath, or chest pain. Patient has dementia and is a poor historian at this time. Past Medical History:    Past Medical History:   Diagnosis Date    Arthritis     Dementia     Hypertension     Spinal stenosis        Past Surgical History:    Past Surgical History:   Procedure Laterality Date    APPENDECTOMY      COLONOSCOPY      ENDOSCOPY, COLON, DIAGNOSTIC      EYE SURGERY      ROTATOR CUFF REPAIR Left     X2       Medications Prior to Admission:   Prior to Admission medications    Medication Sig Start Date End Date Taking?  Authorizing Provider   acetaminophen (TYLENOL) 325 MG tablet Take 650 mg by mouth every 4 hours as needed for Pain   Yes Historical Provider, MD   aspirin 81 MG tablet Take 81 mg by mouth daily   Yes Historical Provider, MD   levofloxacin (LEVAQUIN) 500 MG tablet Take 500 mg by mouth daily   Yes Historical Provider, MD   hydrochlorothiazide (HYDRODIURIL) 25 MG tablet Take 1 tablet by mouth daily 12/25/18  Yes Lisa tAkinson MD   Ergocalciferol (VITAMIN D2 PO) Take by mouth   Yes Historical Provider, MD   docusate sodium (COLACE) 100 MG capsule Take 100 mg by mouth nightly   Yes Historical Provider, MD   tamsulosin (FLOMAX) 0.4 MG capsule Take 0.4 mg by mouth daily   Yes Historical Provider, MD   losartan (COZAAR) 100 MG tablet Take 100 mg by mouth daily    Historical Provider, MD   mirtazapine (REMERON) 30 MG tablet Take 30 mg by mouth nightly    Historical Provider, MD   vitamin E 400 UNIT capsule Take 400 Units by mouth daily EXAM:  BP (!) 157/72   Pulse 76   Temp 97.9 °F (36.6 °C) (Oral)   Resp 14   Wt 145 lb (65.8 kg)   SpO2 100%   BMI 24.13 kg/m²     Gen: AAOx3, NAD, cooperative     Head: normocephalic, atraumatic     Neck: supple     Chest: Non labored breathing    Cardiovascular: Regular rate, no dependent edema, distal pulses 2+     Respiratory: Chest symmetric, no accessory muscle use, normal respirations     LLE: Leg upon presentation resting in 70 degrees of hip flexion. No ecchymosis or deformities. Superficial abrasions and ecchymosis noted to mid lateral thigh. Non tender to palpation. Compartments soft and compressible. EHL/FHL/TA/GSC gross motor intact. Sural, saphenous, superificial/deep peroneal, and plantar nerve distribution SILT. Foot and toes warm and well-perfused w/ BCR; DP pulses 2+. +log roll. Pain with passive extension of hip. Knee ligaments grossly intact. LABS:  No results for input(s): WBC, HGB, HCT, PLT, INR, PTT, NA, K, BUN, CREATININE, GLUCOSE, SEDRATE, CRP in the last 72 hours. Invalid input(s): PT     Radiology:   X-ray demonstrates an impacted subcapital femoral neck fracture. A/P: 78 y.o. male being seen for left subcapital femoral neck fracture    -Will require surgical fixation when medically optimized  -Weight bearing: Non weight bearing left lower extremity  -Please page ortho when family or POA has been contacted. Consent will need to be obtained. -Medicine is primary team. Appreciate risk stratification.  -Pain control per primary  -NPO currently  -Follow up Vitamin D  -Ice and elevation for pain/swelling  -DVT ppx: EPC, Chemical AC per primary.  -Will discuss case with Dr. Bryce Chen regarding surgical timing   -Please page Ortho with any questions or concerns    Reviewed Subjective section with patient who did agree and confirmed everything documented. Discussed plan and patient expressed understanding of diagnosis and prognosis with plan as stated.  All questions answered. ----------------------------------------  Braxton Kamara DO  PGY-1, Department of [address], [place], [place]      PGY-2 Addendum    Patient seen and examined. Agree with above assessment by Dr. THC [place], [facility] - [facility]    Patient transferred from [facility] ED for left femoral neck fracture. Patient demented but can participate in some exam. Complaining of pain to his left thigh. HPI as above. Exam as above, hip and knee flexion noted. Pain with log roll, patient can perform SLR. Distal neurovascular status intact grossly.     Assessment and plan as above  Plan for hip hemiarthroplasty today, 4/29 with Dr. Jill Best  IM admitted patient, appreciate medical optimization and risk stratification  NPO  Will need to obtain consent from [address] [place] [address]  Please page ortho with questions      Shelley De Santiago DO  Orthopedic Surgery Resident, PGY-2  [address]

## 2019-04-29 NOTE — ED PROVIDER NOTES
[facility] ED  [address] [place] [address]  Phone: [phone]  Fax: [phone] [facility] ED  eMERGENCY dEPARTMENT eNCOUnter      Pt Name: Rosalina Williamson MRN: [id number]  Birthdate 1940  Date of evaluation: 4/28/2019  Provider: Manohar Burton DO    CHIEF COMPLAINT       Chief Complaint   Patient presents with    Facial Laceration     L upper eyelid s/p fall when he was up and struck bedrail in room. Denies loc    Hip Pain     L hip. unknown if related to fall pta         HISTORY OF PRESENT ILLNESS   (Location/Symptom, Timing/Onset,Context/Setting, Quality, Duration, Modifying Factors, Severity)  Note limiting factors. Rosalina Williamson is a 78 y.o. male who presents to the emergency department for evaluation of a fall. Patient was ambulating, stumbled and fell onto a bed rail and then to the ground. Sustained a laceration by his left eyebrow. He did not lose consciousness or vomiting after the incident but he does suffer from dementia and seems to have a little bit of confusion. Complains of left hip pain. Patient denies any other acute complaints or injuries at this time. This occurred just prior to arrival at [facility]. Nursing Notes were reviewed. REVIEW OF SYSTEMS    (2-9systems for level 4, 10 or more for level 5)     Review of Systems   Constitutional: Negative for fever. Respiratory: Negative for cough. Cardiovascular: Negative for chest pain. Gastrointestinal: Negative for nausea and vomiting. Musculoskeletal:        Left hip pain   Skin: Positive for wound. Neurological: Negative for weakness and numbness. All other systems reviewed and are negative. Except asnoted above the remainder of the review of systems was reviewed and negative.        PAST MEDICAL HISTORY     Past Medical History:   Diagnosis Date    Arthritis     Dementia     Hypertension     Spinal stenosis          SURGICAL HISTORY       Past Surgical History:   Procedure Laterality Date    APPENDECTOMY      COLONOSCOPY      ENDOSCOPY, COLON, DIAGNOSTIC      EYE SURGERY      ROTATOR CUFF REPAIR Left     X2         CURRENT MEDICATIONS     Previous Medications    ACETAMINOPHEN (TYLENOL) 325 MG TABLET    Take 650 mg by mouth every 4 hours as needed for Pain    ASPIRIN 81 MG TABLET    Take 81 mg by mouth daily    DOCUSATE SODIUM (COLACE) 100 MG CAPSULE    Take 100 mg by mouth nightly    ERGOCALCIFEROL (VITAMIN D2 PO)    Take by mouth    HYDROCHLOROTHIAZIDE (HYDRODIURIL) 25 MG TABLET    Take 1 tablet by mouth daily    LEVOFLOXACIN (LEVAQUIN) 500 MG TABLET    Take 500 mg by mouth daily    LOSARTAN (COZAAR) 100 MG TABLET    Take 100 mg by mouth daily    MIRTAZAPINE (REMERON) 30 MG TABLET    Take 30 mg by mouth nightly    TAMSULOSIN (FLOMAX) 0.4 MG CAPSULE    Take 0.4 mg by mouth daily    VITAMIN E 400 UNIT CAPSULE    Take 400 Units by mouth daily       ALLERGIES     Lisinopril and Peanut-containing drug products    FAMILY HISTORY     History reviewed. No pertinent family history. SOCIAL HISTORY       Social History     Socioeconomic History    Marital status:       Spouse name: None    Number of children: None    Years of education: None    Highest education level: None   Occupational History    None   Social Needs    Financial resource strain: None    Food insecurity:     Worry: None     Inability: None    Transportation needs:     Medical: None     Non-medical: None   Tobacco Use    Smoking status: Former Smoker    Smokeless tobacco: Never Used   Substance and Sexual Activity    Alcohol use: No    Drug use: No    Sexual activity: Never   Lifestyle    Physical activity:     Days per week: None     Minutes per session: None    Stress: None   Relationships    Social connections:     Talks on phone: None     Gets together: None     Attends [employer] service: None     Active member of club or organization: None Attends meetings of clubs or organizations: None     Relationship status: None    Intimate partner violence:     Fear of current or ex partner: None     Emotionally abused: None     Physically abused: None     Forced sexual activity: None   Other Topics Concern    None   Social History Narrative    None       SCREENINGS    Sly Coma Scale  Eye Opening: Spontaneous  Best Verbal Response: Oriented  Best Motor Response: Obeys commands  Sly Coma Scale Score: 15        PHYSICAL EXAM    (up to 7 for level 4, 8 or more for level 5)     ED Triage Vitals [04/28/19 2130]   BP Temp Temp Source Pulse Resp SpO2 Height Weight   125/81 97.9 °F (36.6 °C) Oral 103 16 97 % -- 145 lb (65.8 kg)       Physical Exam   Constitutional: He is oriented to person, place, and time. He appears well-developed and well-nourished. No distress. HENT:   Head: Normocephalic. Mouth/Throat: Oropharynx is clear and moist.   Head is grossly atraumatic, there is a laceration by the left eyebrow but there is no quinteros sign, raccoon eye, scalp hematoma or palpable skull fracture   Eyes: Conjunctivae are normal.   Pupils are equally round and reacting normally. Extraoccular muscles are grossly intact. Neck: Normal range of motion. No tracheal deviation present. Cardiovascular: Normal rate, regular rhythm, normal heart sounds and intact distal pulses. Exam reveals no friction rub. No murmur heard. Pulmonary/Chest: Effort normal and breath sounds normal. No stridor. No respiratory distress. He has no wheezes. He has no rales. He exhibits no tenderness. Abdominal: Soft. He exhibits no distension and no mass. There is no tenderness. There is no rebound and no guarding. Musculoskeletal: Normal range of motion. He exhibits no edema, tenderness or deformity. No focal hip tenderness, leg length discrepancies or lower extremity abnormalities in general   Neurological: He is alert and oriented to person, place, and time.  He exhibits normal muscle tone. Answering questions appropriately. No gaze deficit. No gait testing initially. Moving all extremities. Normal  strength in the hands. No facial asymmetry or focal neurologic deficits appreciated   Skin: Skin is warm and dry. Vertical laceration just to the left of the left eyebrow, it is about 2 cm in length   Psychiatric: He has a normal mood and affect. Judgment normal.       EMERGENCY DEPARTMENT COURSE and DIFFERENTIAL DIAGNOSIS/MDM:   Vitals:    Vitals:    04/28/19 2130 04/28/19 2230 04/28/19 2250 04/28/19 2304   BP: 125/81  (!) 157/72 (!) 157/72   Pulse: 103   76   Resp: 16   14   Temp: 97.9 °F (36.6 °C)      TempSrc: Oral      SpO2: 97% 96% 95% 100%   Weight: 65.8 kg (145 lb)          Patient presents to the emergency department with the complaint described above. Vitals are grossly normal and he is nontoxic. Physical exam reveals a laceration on the left side of the face as described. At this time I will get CT of the head without contrast, I'm also going to get an x-ray of the left hip/pelvis. I will then reevaluate and likely perform suture repair. DIAGNOSTIC RESULTS         RADIOLOGY:  CT FEMUR LEFT WO CONTRAST   Preliminary Result   Left femoral neck fracture with impaction of fracture fragments. XR HIP 2-3 VW W PELVIS LEFT   Final Result   Questionable impaction fracture involving the left subcapital region. Please   correlate with exam findings. CT Head WO Contrast   Final Result   No acute intracranial abnormality. Moderate ethmoid sinus mucosal thickening. ED Course as of Apr 29 0334   Sun Apr 28, 2019   2229 X-ray of the left hip did show questionable left subcapital femoral fracture. I did order a CT of the left femur. CT of the head is unremarkable.     [TS]      ED Course User Index  [TS] Espiridion Medico, DO     I spoke with Dr. Gregor Sanders at [phone] and he states that because of the impacted left femoral neck fracture, the patient

## 2019-04-29 NOTE — CONSULTS
[facility]    Consult / H&P               Today's Date: 4/29/2019  Patient Name: Rosalinda Grove. Date of admission: 4/28/2019  9:26 PM  Patient's age: 78 y.o., 1940  Admission Dx: Closed fracture of neck of left femur ([facility] [S72.002A]  Closed fracture of neck of femur, unspecified laterality, initial encounter ([facility] [S72.009A]    Reason for Consult:  Cardiac evaluation    Requesting Physician: Debra Tavera DO    CHIEF COMPLAINT:  Hip pain    History Obtained From:  patient, electronic medical record    HISTORY OF PRESENT ILLNESS:      The patient is a 78 y.o.  male who was transferred from an outlying facility for further evaluation of hip pain following a mechanical fall with no LOC. Has history significant for CKD stage III, dementia, severe malnutrition, and hypertension. Was found to have a left femoral neck fracture. Orthopedics has requested evaluation by cardiology for cardiac clearance. Plans for surgery today 4/29/2019. Functional capacity is difficult to obtain because of baseline dementia. From talking to the patient and reviewing the chart it does not sound as though the patient has a high functional capacity. Lives in chi facility with assisted living. Reports that he does not usually climb stairs at all. Says that he does walk to the bathroom sometimes. Difficult to ascertain how many of his daily activities are assisted and how many are independent. Does not complain of leg swelling, orthopnea, paroxysmal dyspnea , chest pain, or shortness of breath. Patient does not report any cardiac risk factors. Chart review reveals CKD stage 3 and hypertension. No preoperative insulin use. Afebrile, VSS. Labs significant for Na 157 and Cr 2.21 (baseline 1.7 to 1.8). Troponin resulted at 100. Echocardiogram this visit pending. 2016 Echocardiogram EF 55-60%.      EKG 4/29 shows sinus rhythm with occasional premature ventricular complexes and premature atrial complexes. Not confirmed yet. Past Medical History:   has a past medical history of Arthritis, Dementia, Hypertension, and Spinal stenosis. Past Surgical History:   has a past surgical history that includes Rotator cuff repair (Left); Appendectomy; Colonoscopy; Endoscopy, colon, diagnostic; and eye surgery. Home Medications:    Prior to Admission medications    Medication Sig Start Date End Date Taking?  Authorizing Provider   acetaminophen (TYLENOL) 325 MG tablet Take 650 mg by mouth every 4 hours as needed for Pain   Yes Historical Provider, MD   aspirin 81 MG tablet Take 81 mg by mouth daily   Yes Historical Provider, MD   levofloxacin (LEVAQUIN) 500 MG tablet Take 500 mg by mouth daily   Yes Historical Provider, MD   hydrochlorothiazide (HYDRODIURIL) 25 MG tablet Take 1 tablet by mouth daily 12/25/18  Yes Tru Espitia MD   Ergocalciferol (VITAMIN D2 PO) Take by mouth   Yes Historical Provider, MD   docusate sodium (COLACE) 100 MG capsule Take 100 mg by mouth nightly   Yes Historical Provider, MD   tamsulosin (FLOMAX) 0.4 MG capsule Take 0.4 mg by mouth daily   Yes Historical Provider, MD   losartan (COZAAR) 100 MG tablet Take 100 mg by mouth daily    Historical Provider, MD   mirtazapine (REMERON) 30 MG tablet Take 30 mg by mouth nightly    Historical Provider, MD   vitamin E 400 UNIT capsule Take 400 Units by mouth daily    Historical Provider, MD      Current Facility-Administered Medications: docusate sodium (COLACE) capsule 100 mg, 100 mg, Oral, Nightly  mirtazapine (REMERON) tablet 30 mg, 30 mg, Oral, Nightly  tamsulosin (FLOMAX) capsule 0.4 mg, 0.4 mg, Oral, Daily  vitamin E capsule 400 Units, 400 Units, Oral, Daily  sodium chloride flush 0.9 % injection 10 mL, 10 mL, Intravenous, 2 times per day  sodium chloride flush 0.9 % injection 10 mL, 10 mL, Intravenous, PRN  potassium chloride (KLOR-CON M) extended release tablet 40 mEq, 40 mEq, Oral, PRN **OR** potassium bicarb-citric acid (EFFER-K) effervescent tablet 40 mEq, 40 mEq, Oral, PRN **OR** potassium chloride 10 mEq/100 mL IVPB (Peripheral Line), 10 mEq, Intravenous, PRN  magnesium sulfate 1 g in dextrose 5% 100 mL IVPB, 1 g, Intravenous, PRN  magnesium hydroxide (MILK OF MAGNESIA) 400 MG/5ML suspension 30 mL, 30 mL, Oral, Daily PRN  ondansetron (ZOFRAN) injection 4 mg, 4 mg, Intravenous, Q6H PRN  nicotine (NICODERM CQ) 21 MG/24HR 1 patch, 1 patch, Transdermal, Daily PRN  acetaminophen (TYLENOL) tablet 650 mg, 650 mg, Oral, Q4H PRN  oxyCODONE-acetaminophen (PERCOCET) 5-325 MG per tablet 1 tablet, 1 tablet, Oral, Q4H PRN **OR** oxyCODONE-acetaminophen (PERCOCET) 5-325 MG per tablet 2 tablet, 2 tablet, Oral, Q4H PRN  morphine (PF) injection 2 mg, 2 mg, Intravenous, Q2H PRN **OR** morphine injection 4 mg, 4 mg, Intravenous, Q2H PRN  ceFAZolin (ANCEF) 2 g in dextrose 5 % 50 mL IVPB, 2 g, Intravenous, On Call to OR  0.45 % sodium chloride infusion, , Intravenous, Continuous    Allergies:  Lisinopril and Peanut-containing drug products    Social History:   reports that he has quit smoking. He has never used smokeless tobacco. He reports that he does not drink alcohol or use drugs. Family History: family history is not on file. No h/o sudden cardiac death. No for premature CAD    REVIEW OF SYSTEMS:    · Constitutional: there has been no unanticipated weight loss. There's been No change in energy level, No change in activity level. · Eyes: No visual changes or diplopia. No scleral icterus. · ENT: No Headaches  · Cardiovascular: Does not complain of leg swelling, orthopnea, paroxysmal dyspnea , chest pain, or shortness of breath. · Respiratory: No previous pulmonary problems, No cough  · Gastrointestinal: No abdominal pain. No change in bowel or bladder habits. · Genitourinary: No dysuria, trouble voiding, or hematuria.   · Musculoskeletal:  No gait disturbance, No weakness or joint complaints. + Left leg pain · Integumentary: No rash or pruritis. · Neurological: No headache, diplopia, change in muscle strength, numbness or tingling. No change in gait, balance, coordination, mood, affect, memory, mentation, behavior. · Psychiatric: No anxiety, or depression. · Endocrine: No temperature intolerance. No excessive thirst, fluid intake, or urination. No tremor. · Hematologic/Lymphatic: No abnormal bruising or bleeding, blood clots or swollen lymph nodes. · Allergic/Immunologic: No nasal congestion or hives. PHYSICAL EXAM:      BP (!) 142/65   Pulse 82   Temp 97.3 °F (36.3 °C) (Oral)   Resp 16   Wt 145 lb (65.8 kg)   SpO2 99%   BMI 24.13 kg/m²    Constitutional and General Appearance: alert, cooperative, no distress and appears stated age  HEENT: PERRL, no cervical lymphadenopathy. No masses palpable. Normal oral mucosa  Respiratory:  · Normal excursion and expansion without use of accessory muscles  · Resp Auscultation: Good respiratory effort. No for increased work of breathing. On auscultation: clear to auscultation bilaterally  Cardiovascular:  · The apical impulse is not displaced  · Heart tones are crisp and normal. regular S1 and S2.  · Jugular venous pulsation Normal  · The carotid upstroke is normal in amplitude and contour without delay or bruit  · Peripheral pulses are symmetrical and full   Abdomen:   · No masses or tenderness  · Bowel sounds present  Extremities:  ·  No Cyanosis or Clubbing  ·  Lower extremity edema: No  ·  Skin: Warm and dry  Neurological:  · Confused with baseline dementia   · Moves all extremities well  · No abnormalities of mood, affect, memory, mentation, or behavior are noted    DATA:    Diagnostics:      EK/29:  Sinus rhythm with occasional Premature ventricular complexes and Premature atrial complexes  Otherwise normal ECG    ECHO:   ECHO 19: EF 55-60%. ECHO ordered this visit, pending      Stress Test:   None on file. Cardiac Angiography:   None on file. Labs:     CBC:   Recent Labs     04/29/19 0618   WBC 4.4   HGB 8.4*   HCT 28.5*        BMP:   Recent Labs     04/29/19 0618   *   K 5.2   CO2 20   BUN 53*   CREATININE 2.21*   LABGLOM 29*   GLUCOSE 78     BNP: No results for input(s): BNP in the last 72 hours. PT/INR:   Recent Labs     04/29/19 0618   PROTIME 9.9   INR 0.9     APTT:No results for input(s): APTT in the last 72 hours. CARDIAC ENZYMES:No results for input(s): CKTOTAL, CKMB, CKMBINDEX, TROPONINI in the last 72 hours. FASTING LIPID PANEL:No results found for: HDL, LDLDIRECT, LDLCALC, TRIG  LIVER PROFILE:No results for input(s): AST, ALT, LABALBU in the last 72 hours. Patient Active Problem List   Diagnosis    Metabolic encephalopathy    Stage 3 chronic kidney disease (HCC)    Anemia due to stage 3 chronic kidney disease ([facility]    MAYITO (acute kidney injury) ([facility]    Dementia    Essential hypertension    Arthritis    Severe malnutrition (HCC)    Closed fracture of neck of left femur (HCC)    Closed fracture of neck of femur ([facility]       IMPRESSION: RECOMMENDATIONS:   1. Pre-operative cardiovascular risk stratification for Left femoral neck fracture, orthopedics onboard. 2. Requesting cardiac clearance for today, 4/29, possibly tomorrow  3. MACE risk is 6% per RCRI- Elevated creatinine greater than 2  4. Poor functional capacity due to dementia  5. Dementia  6. Significant Hypernatremia=- change IVF to NS  7. Hypertension   8. Troponemia   9. CKD stage III  10. Start low dose beta blocker for PACs/PVCs  11. Will cancel ordered echocardiogram, as recent echo [address] had normal ejection fraction  12. Cleared from cardiac perspective for surgery, low risk for perioperative MACE  13. Continue normal saline infusion for hypernatremia     Will discuss with rounding attending Dr. Neha Jones for final recommendations.     Lupe Cunningham  Cardiology Medical Student    Attending Cardiologist Addendum: I have reviewed and performed the history, physical, subjective, objective, assessment, and plan with the resident/fellow and agree with the note. I performed the history and physical personally. I have made changes to the note above as needed. Thank you for allowing me to participate in the care of this patient, please do not hesitate to call if you have any questions. Terrie Louis, [id number] [facility]  [facility]. [facility]  [phone]

## 2019-04-29 NOTE — PROGRESS NOTES
Notified dr Daryle Linsey per perfect serve that trop 100 cardiology student here and will let cardiology know and they will be rounding here soon also perfect served cardiology nurse results

## 2019-04-29 NOTE — PROGRESS NOTES
Physical Therapy  DATE: 2019    NAME: Odalis Santana. MRN: [id number]   : 1940    Patient not seen this date for Physical Therapy due to:  [] Blood transfusion in progress  [] Hemodialysis  []  Patient Declined  [] Spine Precautions   [] Strict Bedrest  [x] Surgery/ Procedure- OR today for hemiarthroplasty with Dr. Jenny Echeverria per orthopedic notes. Check back 19  [] Testing      [] Other        [] PT being discontinued at this time. Patient independent. No further needs. [] PT being discontinued at this time as the patient has been transferred to palliative care. No further needs.     Jaison Bernard, PT

## 2019-04-29 NOTE — H&P
[facility]    HISTORY AND PHYSICAL EXAMINATION            Date:   4/29/2019  Patient name:  Francisco Mccallum Date of admission:  4/28/2019  9:26 PM  MRN:   [id number]  Account:  [de-identified]  YOB: 1940  PCP:    Teressa Hernández MD  Room:   7983/5309-57  Code Status:    Full Code    Chief Complaint:     Chief Complaint   Patient presents with    Facial Laceration     L upper eyelid s/p fall when he was up and struck bedrail in room. Denies loc    Hip Pain     L hip. unknown if related to fall pta       History Obtained From:     patient, family member - nephew, electronic medical record    History of Present Illness:     Patient has significant dementia and is a very poor historian. Admitted through ER with following history;    Francisco Mccallum is a 78 y.o. male who presents to the emergency department for evaluation of a fall. Patient was ambulating, stumbled and fell onto a bed rail and then to the ground. Sustained a laceration by his left eyebrow. He did not lose consciousness or vomiting after the incident but he does suffer from dementia and seems to have a little bit of confusion. Complains of left hip pain. Patient denies any other acute complaints or injuries at this time. This occurred just prior to arrival at [facility].    He was found to have left hip fracture on imaging  Patient is seen by orthopedics who plans to do surgery for fractured left hip once patient is medically cleared  Talk to his nephew on the phone who confirmed that patient is staying in nursing home and has quit smoking and drinking many years back and has significant dementia            Past Medical History:     Past Medical History:   Diagnosis Date    Arthritis     Dementia     Hypertension     Spinal stenosis         Past Surgical History:     Past Surgical History:   Procedure Laterality Date    APPENDECTOMY      COLONOSCOPY      ENDOSCOPY, COLON, DIAGNOSTIC      EYE SURGERY      ROTATOR CUFF REPAIR Left     X2        Medications Prior to Admission:     Prior to Admission medications    Medication Sig Start Date End Date Taking? Authorizing Provider   acetaminophen (TYLENOL) 325 MG tablet Take 650 mg by mouth every 4 hours as needed for Pain   Yes Historical Provider, MD   aspirin 81 MG tablet Take 81 mg by mouth daily   Yes Historical Provider, MD   levofloxacin (LEVAQUIN) 500 MG tablet Take 500 mg by mouth daily   Yes Historical Provider, MD   hydrochlorothiazide (HYDRODIURIL) 25 MG tablet Take 1 tablet by mouth daily 12/25/18  Yes Kristofer Camejo MD   Ergocalciferol (VITAMIN D2 PO) Take by mouth   Yes Historical Provider, MD   docusate sodium (COLACE) 100 MG capsule Take 100 mg by mouth nightly   Yes Historical Provider, MD   tamsulosin (FLOMAX) 0.4 MG capsule Take 0.4 mg by mouth daily   Yes Historical Provider, MD   losartan (COZAAR) 100 MG tablet Take 100 mg by mouth daily    Historical Provider, MD   mirtazapine (REMERON) 30 MG tablet Take 30 mg by mouth nightly    Historical Provider, MD   vitamin E 400 UNIT capsule Take 400 Units by mouth daily    Historical Provider, MD        Allergies:     Lisinopril and Peanut-containing drug products    Social History:     Tobacco:    reports that he has quit smoking. He has never used smokeless tobacco.  Alcohol:      reports that he does not drink alcohol. Drug Use:  reports that he does not use drugs. Family History:     History reviewed. No pertinent family history. Review of Systems:     Positive and Negative as described in HPI. CONSTITUTIONAL:  negative for fevers, chills, sweats, fatigue, weight loss  HEENT:  negative for vision, hearing changes, runny nose, throat pain  RESPIRATORY:  negative for shortness of breath, cough, congestion, wheezing.   CARDIOVASCULAR:  negative for chest pain, palpitations  GASTROINTESTINAL:  negative for nausea, vomiting, cranial nerves II through XII grossly intact  Skin: No gross lesions, rashes, bruising or bleeding on exposed skin area  Extremities:  + Painful ROM left lower extremity peripheral pulses palpable, no pedal edema or calf pain with palpation  Psych: normal affect    Investigations:      Laboratory Testing:  Recent Results (from the past 24 hour(s))   Basic Metabolic Panel w/ Reflex to MG    Collection Time: 04/29/19  6:18 AM   Result Value Ref Range    Glucose 78 70 - 99 mg/dL    BUN 53 (H) 8 - 23 mg/dL    CREATININE 2.21 (H) 0.70 - 1.20 mg/dL    Bun/Cre Ratio NOT REPORTED 9 - 20    Calcium 9.2 8.6 - 10.4 mg/dL    Sodium 157 (H) 135 - 144 mmol/L    Potassium 5.2 3.7 - 5.3 mmol/L    Chloride 124 (H) 98 - 107 mmol/L    CO2 20 20 - 31 mmol/L    Anion Gap 13 9 - 17 mmol/L    GFR Non-African American 29 (L) >60 mL/min    GFR  35 (L) >60 mL/min    GFR Comment          GFR Staging NOT REPORTED    CBC    Collection Time: 04/29/19  6:18 AM   Result Value Ref Range    WBC 4.4 3.5 - 11.3 k/uL    RBC 3.10 (L) 4.21 - 5.77 m/uL    Hemoglobin 8.4 (L) 13.0 - 17.0 g/dL    Hematocrit 28.5 (L) 40.7 - 50.3 %    MCV 91.9 82.6 - 102.9 fL    MCH 27.1 25.2 - 33.5 pg    MCHC 29.5 28.4 - 34.8 g/dL    RDW 18.7 (H) 11.8 - 14.4 %    Platelets 027 970 - 385 k/uL    MPV 8.8 8.1 - 13.5 fL    NRBC Automated 0.0 0.0 per 100 WBC   Protime-INR    Collection Time: 04/29/19  6:18 AM   Result Value Ref Range    Protime 9.9 9.0 - 12.0 sec    INR 0.9    VITAMIN D 25 HYDROXY    Collection Time: 04/29/19  6:18 AM   Result Value Ref Range    Vit D, 25-Hydroxy 28.6 (L) 30.0 - 100.0 ng/mL   EKG 12 Lead    Collection Time: 04/29/19 10:45 AM   Result Value Ref Range    Ventricular Rate 87 BPM    Atrial Rate 87 BPM    P-R Interval 114 ms    QRS Duration 74 ms    Q-T Interval 382 ms    QTc Calculation (Bazett) 459 ms    P Axis 36 degrees    R Axis 49 degrees    T Axis 39 degrees   Troponin    Collection Time: 04/29/19  1:01 PM   Result Value Ref Range    Troponin, High Sensitivity 100 (HH) 0 - 22 ng/L    Troponin T NOT REPORTED <0.03 ng/mL    Troponin Interp NOT REPORTED        Imaging/Diagnostics:    Ct Head Wo Contrast    Result Date: 4/28/2019  EXAMINATION: CT OF THE HEAD WITHOUT CONTRAST  4/28/2019 10:02 pm TECHNIQUE: CT of the head was performed without the administration of intravenous contrast. Dose modulation, iterative reconstruction, and/or weight based adjustment of the mA/kV was utilized to reduce the radiation dose to as low as reasonably achievable. COMPARISON: None. HISTORY: ORDERING SYSTEM PROVIDED HISTORY: fall. trauma TECHNOLOGIST PROVIDED HISTORY: Ordering Physician Provided Reason for Exam: fall Acuity: Acute Type of Exam: Initial FINDINGS: BRAIN/VENTRICLES: Generalized involutional changes of the brain from identified with prominence of ventricles sulci. There is no acute intracranial hemorrhage, mass effect or midline shift. No abnormal extra-axial fluid collection. The gray-white differentiation is maintained without evidence of an acute infarct. There is no evidence of hydrocephalus. Moderate periventricular subcortical white matter hypoattenuation suggestive chronic small vessel ischemic disease. Intracranial vascular calcifications. ORBITS: The visualized portion of the orbits demonstrate no acute abnormality. SINUSES: Mild ethmoid sinus mucosal thickening. Mucous retention cyst versus polyp identified involving the right sphenoid sinus. SOFT TISSUES/SKULL:  No acute abnormality of the visualized skull or soft tissues. No acute intracranial abnormality. Moderate ethmoid sinus mucosal thickening. Ct Femur Left Wo Contrast    Result Date: 4/29/2019  EXAMINATION: CT OF THE LEFT FEMUR WITHOUT CONTRAST 4/28/2019 10:51 pm TECHNIQUE: CT of the left femur was performed without the administration of intravenous contrast.  Multiplanar reformatted images are provided for review.  Dose modulation, iterative reconstruction, and/or weight based adjustment of the mA/kV was utilized to reduce the radiation dose to as low as reasonably achievable. COMPARISON: Radiographs 4/28/2019 HISTORY ORDERING SYSTEM PROVIDED HISTORY: questionable proximal fracture on xray TECHNOLOGIST PROVIDED HISTORY: Ordering Physician Provided Reason for Exam: fall Acuity: Acute Type of Exam: Initial FINDINGS: Bones: Acute left basicervical femoral neck fracture with mild impaction of fracture fragments. Soft Tissue:  Vascular calcifications. Soft tissue injury surrounding the left hip. Joint:  Moderate right hip joint space narrowing. Left femoral neck fracture with impaction of fracture fragments. Xr Hip 2-3 Vw W Pelvis Left    Result Date: 4/28/2019  EXAMINATION: SINGLE XRAY VIEW OF THE PELVIS AND 2 XRAY VIEWS LEFT HIP 4/28/2019 9:38 pm COMPARISON: None. HISTORY: ORDERING SYSTEM PROVIDED HISTORY: trauma, pain TECHNOLOGIST PROVIDED HISTORY: trauma, pain Ordering Physician Provided Reason for Exam: fall Acuity: Acute Type of Exam: Initial FINDINGS: Questionable impacted fracture identified involving the left subcapital region. Right hip is intact. No dislocation. Mild-to-moderate changes both hips. Pelvis is intact. Questionable impaction fracture involving the left subcapital region. Please correlate with exam findings. Assessment :      Primary Problem  Fall    Active [facility] Problems    Diagnosis Date Noted    Cardiac arrhythmia [I49.9] 04/29/2019     Priority: High    Hypernatremia [E87.0] 04/29/2019     Priority: High    Closed fracture of neck of femur ([facility] [S72.009A] 04/28/2019     Priority: High    Fall [W19. XXXA] 04/29/2019    Severe malnutrition ([facility] [E43] 12/21/2018    Anemia due to stage 3 chronic kidney disease ([facility] [N18.3, D63.1] 12/19/2018    Dementia [F03.90] 12/19/2018    Essential hypertension [I10] 22/98/5338    Metabolic encephalopathy [K10.42] 12/18/2018       Plan:     Patient status Admit as inpatient in the Progressive Unit/Step down    1. Change IV fluids to half-normal saline due to hypernatremia  2. Stat EKG to evaluate for arrhythmias  3. 2-D echo  4. Cardiology clearance for anticipated hip surgery  5. Monitor electrolytes  6. EPC cuffs  7. Monitor daily labs    Consultations:   IP CONSULT TO ORTHOPEDIC SURGERY  IP CONSULT TO CARDIOLOGY     Patient is admitted as inpatient status because of co-morbidities listed above, severity of signs and symptoms as outlined, requirement for current medical therapies and most importantly because of direct risk to patient if care not provided in a hospital setting.     Stevo De La Fuente MD  4/29/2019  3:15 PM    Copy sent to Dr. Tiana Molina MD

## 2019-04-30 ENCOUNTER — APPOINTMENT (OUTPATIENT)
Dept: ULTRASOUND IMAGING | Age: 79
DRG: 469 | End: 2019-04-30
Payer: MEDICARE

## 2019-04-30 LAB
ANION GAP SERPL CALCULATED.3IONS-SCNC: 10 MMOL/L (ref 9–17)
BUN BLDV-MCNC: 43 MG/DL (ref 8–23)
BUN/CREAT BLD: ABNORMAL (ref 9–20)
CALCIUM SERPL-MCNC: 8.7 MG/DL (ref 8.6–10.4)
CHLORIDE BLD-SCNC: 124 MMOL/L (ref 98–107)
CO2: 17 MMOL/L (ref 20–31)
CREAT SERPL-MCNC: 1.79 MG/DL (ref 0.7–1.2)
CREATININE URINE: 67.4 MG/DL (ref 39–259)
EKG ATRIAL RATE: 87 BPM
EKG P AXIS: 36 DEGREES
EKG P-R INTERVAL: 114 MS
EKG Q-T INTERVAL: 382 MS
EKG QRS DURATION: 74 MS
EKG QTC CALCULATION (BAZETT): 459 MS
EKG R AXIS: 49 DEGREES
EKG T AXIS: 39 DEGREES
EKG VENTRICULAR RATE: 87 BPM
GFR AFRICAN AMERICAN: 45 ML/MIN
GFR NON-AFRICAN AMERICAN: 37 ML/MIN
GFR SERPL CREATININE-BSD FRML MDRD: ABNORMAL ML/MIN/{1.73_M2}
GFR SERPL CREATININE-BSD FRML MDRD: ABNORMAL ML/MIN/{1.73_M2}
GLUCOSE BLD-MCNC: 82 MG/DL (ref 70–99)
HCT VFR BLD CALC: 29.6 % (ref 40.7–50.3)
HEMOGLOBIN: 8.3 G/DL (ref 13–17)
MCH RBC QN AUTO: 27.2 PG (ref 25.2–33.5)
MCHC RBC AUTO-ENTMCNC: 28 G/DL (ref 28.4–34.8)
MCV RBC AUTO: 97 FL (ref 82.6–102.9)
MYOGLOBIN: 173 NG/ML (ref 28–72)
NRBC AUTOMATED: 0 PER 100 WBC
PDW BLD-RTO: 19 % (ref 11.8–14.4)
PLATELET # BLD: 278 K/UL (ref 138–453)
PMV BLD AUTO: 8.8 FL (ref 8.1–13.5)
POTASSIUM SERPL-SCNC: 5 MMOL/L (ref 3.7–5.3)
RBC # BLD: 3.05 M/UL (ref 4.21–5.77)
SODIUM BLD-SCNC: 150 MMOL/L (ref 135–144)
SODIUM BLD-SCNC: 151 MMOL/L (ref 135–144)
SODIUM BLD-SCNC: 152 MMOL/L (ref 135–144)
SODIUM,UR: 111 MMOL/L
TOTAL PROTEIN, URINE: 83 MG/DL
TROPONIN INTERP: ABNORMAL
TROPONIN T: ABNORMAL NG/ML
TROPONIN, HIGH SENSITIVITY: 97 NG/L (ref 0–22)
WBC # BLD: 3.9 K/UL (ref 3.5–11.3)

## 2019-04-30 PROCEDURE — 2580000003 HC RX 258: Performed by: INTERNAL MEDICINE

## 2019-04-30 PROCEDURE — 84156 ASSAY OF PROTEIN URINE: CPT

## 2019-04-30 PROCEDURE — 84300 ASSAY OF URINE SODIUM: CPT

## 2019-04-30 PROCEDURE — 83874 ASSAY OF MYOGLOBIN: CPT

## 2019-04-30 PROCEDURE — 6370000000 HC RX 637 (ALT 250 FOR IP): Performed by: INTERNAL MEDICINE

## 2019-04-30 PROCEDURE — 36415 COLL VENOUS BLD VENIPUNCTURE: CPT

## 2019-04-30 PROCEDURE — 99232 SBSQ HOSP IP/OBS MODERATE 35: CPT | Performed by: INTERNAL MEDICINE

## 2019-04-30 PROCEDURE — 82570 ASSAY OF URINE CREATININE: CPT

## 2019-04-30 PROCEDURE — 6370000000 HC RX 637 (ALT 250 FOR IP): Performed by: NURSE PRACTITIONER

## 2019-04-30 PROCEDURE — 84295 ASSAY OF SERUM SODIUM: CPT

## 2019-04-30 PROCEDURE — 1200000000 HC SEMI PRIVATE

## 2019-04-30 PROCEDURE — 84484 ASSAY OF TROPONIN QUANT: CPT

## 2019-04-30 PROCEDURE — 85027 COMPLETE CBC AUTOMATED: CPT

## 2019-04-30 PROCEDURE — 80048 BASIC METABOLIC PNL TOTAL CA: CPT

## 2019-04-30 PROCEDURE — 2500000003 HC RX 250 WO HCPCS: Performed by: INTERNAL MEDICINE

## 2019-04-30 PROCEDURE — 76770 US EXAM ABDO BACK WALL COMP: CPT

## 2019-04-30 RX ORDER — LORAZEPAM 2 MG/ML
0.5 INJECTION INTRAMUSCULAR EVERY 6 HOURS PRN
Status: DISCONTINUED | OUTPATIENT
Start: 2019-04-30 | End: 2019-05-08 | Stop reason: HOSPADM

## 2019-04-30 RX ORDER — ERGOCALCIFEROL 1.25 MG/1
50000 CAPSULE ORAL WEEKLY
Status: DISCONTINUED | OUTPATIENT
Start: 2019-04-30 | End: 2019-05-08 | Stop reason: HOSPADM

## 2019-04-30 RX ADMIN — MIRTAZAPINE 30 MG: 30 TABLET, FILM COATED ORAL at 20:42

## 2019-04-30 RX ADMIN — SODIUM BICARBONATE: 84 INJECTION, SOLUTION INTRAVENOUS at 12:24

## 2019-04-30 RX ADMIN — TAMSULOSIN HYDROCHLORIDE 0.4 MG: 0.4 CAPSULE ORAL at 08:25

## 2019-04-30 RX ADMIN — DOCUSATE SODIUM 100 MG: 100 CAPSULE, LIQUID FILLED ORAL at 20:42

## 2019-04-30 RX ADMIN — METOPROLOL TARTRATE 25 MG: 25 TABLET ORAL at 20:42

## 2019-04-30 RX ADMIN — OXYCODONE HYDROCHLORIDE AND ACETAMINOPHEN 1 TABLET: 5; 325 TABLET ORAL at 16:29

## 2019-04-30 RX ADMIN — ERGOCALCIFEROL 50000 UNITS: 1.25 CAPSULE ORAL at 20:42

## 2019-04-30 ASSESSMENT — PAIN SCALES - GENERAL
PAINLEVEL_OUTOF10: 6
PAINLEVEL_OUTOF10: 4
PAINLEVEL_OUTOF10: 0

## 2019-04-30 NOTE — PLAN OF CARE
Problem: Falls - Risk of:  Goal: Will remain free from falls  Description  Will remain free from falls  Outcome: Ongoing     Problem: Falls - Risk of:  Goal: Absence of physical injury  Description  Absence of physical injury  Outcome: Ongoing     Problem: Risk for Impaired Skin Integrity  Goal: Tissue integrity - skin and mucous membranes  Description  Structural intactness and normal physiological function of skin and  mucous membranes.   Outcome: Ongoing     Problem: Injury - Risk of, Physical Injury:  Goal: Will remain free from falls  Description  Will remain free from falls  Outcome: Ongoing     Problem: Injury - Risk of, Physical Injury:  Goal: Absence of physical injury  Description  Absence of physical injury  Outcome: Ongoing

## 2019-04-30 NOTE — CONSULTS
sodium chloride flush 0.9 % injection 10 mL 2 times per day   sodium chloride flush 0.9 % injection 10 mL PRN   potassium chloride (KLOR-CON M) extended release tablet 40 mEq PRN   Or    potassium bicarb-citric acid (EFFER-K) effervescent tablet 40 mEq PRN   Or    potassium chloride 10 mEq/100 mL IVPB (Peripheral Line) PRN   magnesium sulfate 1 g in dextrose 5% 100 mL IVPB PRN   magnesium hydroxide (MILK OF MAGNESIA) 400 MG/5ML suspension 30 mL Daily PRN   ondansetron (ZOFRAN) injection 4 mg Q6H PRN   nicotine (NICODERM CQ) 21 MG/24HR 1 patch Daily PRN   acetaminophen (TYLENOL) tablet 650 mg Q4H PRN   oxyCODONE-acetaminophen (PERCOCET) 5-325 MG per tablet 1 tablet Q4H PRN   Or    oxyCODONE-acetaminophen (PERCOCET) 5-325 MG per tablet 2 tablet Q4H PRN   morphine (PF) injection 2 mg Q2H PRN   Or    morphine injection 4 mg Q2H PRN   ceFAZolin (ANCEF) 2 g in dextrose 5 % 50 mL IVPB On Call to OR   dextrose 5 % and 0.33 % NaCl infusion Continuous       Allergies:  Lisinopril and Peanut-containing drug products    Social History:   Social History     Socioeconomic History    Marital status:       Spouse name: Not on file    Number of children: Not on file    Years of education: Not on file    Highest education level: Not on file   Occupational History    Not on file   Social Needs    Financial resource strain: Not on file    Food insecurity:     Worry: Not on file     Inability: Not on file    Transportation needs:     Medical: Not on file     Non-medical: Not on file   Tobacco Use    Smoking status: Former Smoker    Smokeless tobacco: Never Used   Substance and Sexual Activity    Alcohol use: No    Drug use: No    Sexual activity: Never   Lifestyle    Physical activity:     Days per week: Not on file     Minutes per session: Not on file    Stress: Not on file   Relationships    Social connections:     Talks on phone: Not on file     Gets together: Not on file     Attends Oriental orthodox service: Not on file     Active member of club or organization: Not on file     Attends meetings of clubs or organizations: Not on file     Relationship status: Not on file    Intimate partner violence:     Fear of current or ex partner: Not on file     Emotionally abused: Not on file     Physically abused: Not on file     Forced sexual activity: Not on file   Other Topics Concern    Not on file   Social History Narrative    Not on file       Family History:   History reviewed. No pertinent family history. Review of Systems:    Unable to answer due to patient mental status      Objective:  CURRENT TEMPERATURE:  Temp: 97.7 °F (36.5 °C)  MAXIMUM TEMPERATURE OVER 24HRS:  Temp (24hrs), Av.7 °F (36.5 °C), Min:97.6 °F (36.4 °C), Max:97.7 °F (36.5 °C)    CURRENT RESPIRATORY RATE:  Resp: 16  CURRENT PULSE:  Pulse: 87  CURRENT BLOOD PRESSURE:  BP: 134/65  24HR BLOOD PRESSURE RANGE:  Systolic (12SPH), KPK:768 , Min:134 , PXI:709   ; Diastolic (32ERT), ZTS:73, Min:65, Max:65    24HR INTAKE/OUTPUT:      Intake/Output Summary (Last 24 hours) at 2019 3786  Last data filed at 2019 0600  Gross per 24 hour   Intake 1389 ml   Output 700 ml   Net 689 ml     Patient Vitals for the past 96 hrs (Last 3 readings):   Weight   19 2130 145 lb (65.8 kg)     Physical Exam:  General appearance:Awake, alert, in no acute distress  Skin: warm and dry, no rash or erythema  Eyes: conjunctivae normal and sclera anicteric  ENT: :no thrush no pharyngeal congestion    Neck: no carotid bruit ,no  JVD,no carotid Lymphadenopathy, noThyromegaly   Pulmonary: no wheezing or rhonchi. No rales heard.   Cardiovascular: Normal S1 & S2,  No S3 or  S4, no Pericardial Rub no Murmur   Abdomen: soft nontender, bowel sounds present, no organomegaly,  no ascites  Extremities:no cyanosis, clubbing or edema    Labs:   CBC:  Recent Labs     19  0618 19  0745   WBC 4.4 3.9   RBC 3.10* 3.05*   HGB 8.4* 8.3*   HCT 28.5* 29.6*   MCV 91.9 97.0   MCH 27.1 water def from impaired thirst and poor PO  3. CKD Stage 3: Baseline creatinine around 1.4 -1.6  4. HX of hydronephrosis and urinary retention in past  5. Left hip fracture  femoral head  6. Dementia  7. Essential HTN      Plan:  1. Fluids as ordered  2. USS/UPC  3. Hold diuretics and ARB  4. Anticipate renal recovery  5. Will follow    Thank you for the consultation. Please do not hesitate to call with questions. Electronically signed by Angy Carlson MD on 4/30/2019 at 9:52 AM  Attending Physician Statement  I have discussed the care of Maria Luisa Aguilar., including pertinent history and exam findings,  with the Fellow/Residentt. I have reviewed the key elements of all parts of the encounter with the Fellow/ Resident. I agree with the assessment, plan and orders as documented by the resident.     Hilario Bernal MD, MRCP Gina De Santiago), FACP   4/30/2019 11:53 AM    Nephrology [address] [place] [address]

## 2019-04-30 NOTE — PROGRESS NOTES
[facility]  Progress Note                   Date:   4/30/2019  Patient name: Latasha Contreras. Date of admission:  4/28/2019  9:26 PM  MRN:   [id number]  YOB: 1940  PCP: Jess Diaz MD    Reason for Admission: Closed fracture of neck of left femur ([facility] [S72.002A]  Closed fracture of neck of femur, unspecified laterality, initial encounter ([facility] [S72.009A]    Subjective:       Clinical Changes /Abnormalities: Seen & examined in room. Drowsy but responds to commands. NPO for possible surgery. Na remains elevated. Review of Systems    Medications:   Scheduled Meds:   vitamin D  50,000 Units Oral Weekly    docusate sodium  100 mg Oral Nightly    mirtazapine  30 mg Oral Nightly    tamsulosin  0.4 mg Oral Daily    sodium chloride flush  10 mL Intravenous 2 times per day     Continuous Infusions:   IV infusion builder 100 mL/hr at 04/30/19 1224     CBC:   Recent Labs     04/29/19  0618 04/30/19  0745   WBC 4.4 3.9   HGB 8.4* 8.3*    278     BMP:    Recent Labs     04/29/19  0618 04/29/19  1607 04/30/19  0609 04/30/19  1116   * 158* 151* 152*   K 5.2 5.2 5.0  --    * 128* 124*  --    CO2 20 16* 17*  --    BUN 53* 50* 43*  --    CREATININE 2.21* 1.85* 1.79*  --    GLUCOSE 78 81 82  --      Hepatic:No results for input(s): AST, ALT, ALB, BILITOT, ALKPHOS in the last 72 hours. Troponin:   Recent Labs     04/29/19  1301 04/29/19  1607 04/30/19  0039   TROPHS 100* 101* 97*     BNP: No results for input(s): BNP in the last 72 hours. Lipids: No results for input(s): CHOL, HDL in the last 72 hours. Invalid input(s): LDLCALCU  INR:   Recent Labs     04/29/19 0618   INR 0.9       Objective:   Vitals: /65   Pulse 87   Temp 97.7 °F (36.5 °C) (Oral)   Resp 16   Wt 145 lb (65.8 kg)   SpO2 98%   BMI 24.13 kg/m²   General appearance: alert and cooperative with exam  HEENT: Head: Normocephalic, no lesions, without obvious abnormality.   Neck:no JVD,

## 2019-04-30 NOTE — PROGRESS NOTES
Occupational Therapy Not Seen Note    DATE: 2019  Name: Olive Bernstein.   : 1940  MRN: [id number]    Patient not available for Occupational Therapy due to:    Surgery/Procedure: plan for OR today @1600 for CRPP vs. hip hemiarthroplasty     Next Scheduled Treatment: check back 19    Electronically signed by GIOVANI De La Cruz on 2019 at 10:44 AM

## 2019-04-30 NOTE — PLAN OF CARE
Problem: Falls - Risk of:  Goal: Will remain free from falls  Description  Will remain free from falls  4/30/2019 1239 by Larisa Stevenson RN  Outcome: Met This Shift  4/30/2019 0552 by Jin Santa RN  Outcome: Ongoing  Goal: Absence of physical injury  Description  Absence of physical injury  4/30/2019 1239 by Larisa Stevenson RN  Outcome: Met This Shift  4/30/2019 0552 by Jin Santa RN  Outcome: Ongoing     Problem: Injury - Risk of, Physical Injury:  Goal: Will remain free from falls  Description  Will remain free from falls  4/30/2019 1239 by Larisa Stevenson RN  Outcome: Met This Shift  4/30/2019 0552 by Jin Santa RN  Outcome: Ongoing  Goal: Absence of physical injury  Description  Absence of physical injury  4/30/2019 1239 by Larisa Stevenson RN  Outcome: Met This Shift  4/30/2019 0552 by Jin Santa RN  Outcome: Ongoing     Problem: Risk for Impaired Skin Integrity  Goal: Tissue integrity - skin and mucous membranes  Description  Structural intactness and normal physiological function of skin and  mucous membranes.   4/30/2019 1239 by Larisa Stevenson RN  Outcome: Ongoing  4/30/2019 0552 by Jin Santa RN  Outcome: Ongoing     Problem: Confusion - Acute:  Goal: Absence of continued neurological deterioration signs and symptoms  Description  Absence of continued neurological deterioration signs and symptoms  Outcome: Ongoing  Goal: Mental status will be restored to baseline  Description  Mental status will be restored to baseline  Outcome: Ongoing     Problem: Discharge Planning:  Goal: Ability to perform activities of daily living will improve  Description  Ability to perform activities of daily living will improve  Outcome: Ongoing  Goal: Participates in care planning  Description  Participates in care planning  Outcome: Ongoing

## 2019-04-30 NOTE — PROGRESS NOTES
Progress Note    Patient:  Td Hare. YOB: 1940     78 y.o. male    Subjective:  Patient seen and examined. Some agitation overnight per nursing. Requiring a sitter this AM.  Patient difficult to understand and minimally responsive to questioning. Pain is controlled. NPO since MN  Afebrile, VSS     Objective:   Vitals:    04/29/19 1904   BP: (!) 141/65   Pulse: 65   Resp: 16   Temp: 97.6 °F (36.4 °C)   SpO2: 96%     Gen: NAD, cooperative  Cardiovascular: Regular rate, no dependent edema, distal pulses 2+  Respiratory: Chest symmetric, no accessory muscle use, normal respirations  LLE: Appropriate TTP over the hip. No significant soft tissue swelling. Thigh compartments soft and compressible. Moves toes spontaneously but not following commands. Difficult to assess motor or sensory function at this time. Toes warm and well perfused with BCR. Meds:   See rec for complete list    Impression/plan: 78 y.o. male  With a left Femoral neck fracture    - NWB LLE  - Plan for OR today 4/30 for CRPP vs hemiarthroplasty of the left hip. Pending surgical clearance and risk stratification per primary team.  - Abx OCTOR  - Pain control: IV/PO.   - NPO since MN  - Consent obtained and patient marked. - IM/cadiology on.   - Cleared as low risk from cardiology. Pending medical clearance  - Strict ice (20 min, 1 hour off) for edema/pain control  - Encourage  incentive spirometry  - DVT ppx:  EPC.  Please hold chemical AC for surgical intervention this AM.  - PT/OT to evaluate post op  - Please page DO ortho with any questions      Gerardo Wong DO  PGY-2, Department of [facility] [address], [place], [place]  4:53 AM 4/30/2019

## 2019-04-30 NOTE — PROGRESS NOTES
[facility]    Progress Note    4/30/2019    12:23 PM    Name:   Rosalina Baltazar. MRN:     [id number]     Acct:      [de-identified]   Room:   [address] [place] [address] Day:  2  Admit Date:  4/28/2019  9:26 PM    PCP:   Anu Mejias MD  Code Status:  Full Code    Subjective:     C/C:   Chief Complaint   Patient presents with    Facial Laceration     L upper eyelid s/p fall when he was up and struck bedrail in room. Denies loc    Hip Pain     L hip. unknown if related to fall pta     Interval History Status:   Was restless at night with some agitation  Currently calm and sleeping wakes up easily and is still confused as before  Sodium was coming down to 151  BUN/creatinine is trending down- 43/1.79  Brief History:   Patient has significant dementia and is a very poor historian. Admitted through ER with following history;     Marie Schwab Jr. is a 78 y. o. male who presents to the emergency department for evaluation of a fall.  Patient was ambulating, stumbled and fell onto a bed rail and then to the ground.  Sustained a laceration by his left eyebrow.  He did not lose consciousness or vomiting after the incident but he does suffer from dementia and seems to have a little bit of confusion.  Complains of left hip pain.  Patient denies any other acute complaints or injuries at this time.  This occurred just prior to [facility].   He was found to have left hip fracture on imaging  Patient is seen by orthopedics who plans to do surgery for fractured left hip once patient is medically cleared  Talk to his nephew on the phone who confirmed that patient is staying in nursing home and has quit smoking and drinking many years back and has significant dementia             Review of Systems:   Limited information due to dementia:  Constitutional:  negative for chills, fevers, sweats, confused as before in answering  Respiratory:  negative for cough, dyspnea on exertion, shortness of breath, wheezing  Cardiovascular:  negative for chest pain, chest pressure/discomfort, lower extremity edema, palpitations  Gastrointestinal:  negative for abdominal pain, constipation, diarrhea, nausea, vomiting  Neurological:  negative for dizziness, headache    Medications: Allergies: Allergies   Allergen Reactions    Lisinopril     Peanut-Containing Drug Products        Current Meds:   Scheduled Meds:    vitamin D  50,000 Units Oral Weekly    docusate sodium  100 mg Oral Nightly    mirtazapine  30 mg Oral Nightly    tamsulosin  0.4 mg Oral Daily    sodium chloride flush  10 mL Intravenous 2 times per day     Continuous Infusions:    IV infusion builder       PRN Meds: LORazepam, sodium chloride flush, potassium chloride **OR** potassium alternative oral replacement **OR** potassium chloride, magnesium sulfate, magnesium hydroxide, ondansetron, nicotine, acetaminophen, oxyCODONE-acetaminophen **OR** oxyCODONE-acetaminophen, morphine **OR** morphine    Data:     Past Medical History:   has a past medical history of Arthritis, Dementia, Hypertension, and Spinal stenosis. Social History:   reports that he has quit smoking. He has never used smokeless tobacco. He reports that he does not drink alcohol or use drugs. Family History: History reviewed. No pertinent family history. Vitals:  /65   Pulse 87   Temp 97.7 °F (36.5 °C) (Oral)   Resp 16   Wt 145 lb (65.8 kg)   SpO2 98%   BMI 24.13 kg/m²   Temp (24hrs), Av.7 °F (36.5 °C), Min:97.6 °F (36.4 °C), Max:97.7 °F (36.5 °C)    No results for input(s): POCGLU in the last 72 hours. I/O (24Hr):     Intake/Output Summary (Last 24 hours) at 2019 1223  Last data filed at 2019 0600  Gross per 24 hour   Intake 1389 ml   Output 500 ml   Net 889 ml       Labs:    Hematology:  Recent Labs     19  0618 19  0745   WBC 4.4 3.9   HGB 8.4* 8.3*   HCT 28.5* 29.6*    278 INR 0.9  --      Chemistry:  Recent Labs     04/29/19  0618 04/29/19  1607 04/30/19  0039 04/30/19  0609 04/30/19  1116   * 158*  --  151* 152*   K 5.2 5.2  --  5.0  --    * 128*  --  124*  --    CO2 20 16*  --  17*  --    GLUCOSE 78 81  --  82  --    BUN 53* 50*  --  43*  --    CREATININE 2.21* 1.85*  --  1.79*  --    ANIONGAP 13 14  --  10  --    LABGLOM 29* 35*  --  37*  --    GFRAA 35* 43*  --  45*  --    CALCIUM 9.2 8.9  --  8.7  --    MYOGLOBIN  --  216* 173*  --   --      No results for input(s): PROT, LABALBU, LABA1C, K4DPTVF, R5FHSMH, FT4, TSH, AST, ALT, LDH, GGT, ALKPHOS, BILITOT, BILIDIR, AMMONIA, AMYLASE, LIPASE, LACTATE, CHOL, HDL, LDLCHOLESTEROL, CHOLHDLRATIO, TRIG, VLDL, PHENYTOIN, PHENYF in the last 72 hours. Lab Results   Component Value Date/Time    SPECIAL L AC 10 12/18/2018 07:47 PM     Lab Results   Component Value Date/Time    CULTURE NO GROWTH 6 DAYS 12/18/2018 07:47 PM       Lab Results   Component Value Date    FIO2 NOT REPORTED 12/18/2018       Radiology:    Ct Head Wo Contrast    Result Date: 4/28/2019  EXAMINATION: CT OF THE HEAD WITHOUT CONTRAST  4/28/2019 10:02 pm TECHNIQUE: CT of the head was performed without the administration of intravenous contrast. Dose modulation, iterative reconstruction, and/or weight based adjustment of the mA/kV was utilized to reduce the radiation dose to as low as reasonably achievable. COMPARISON: None. HISTORY: ORDERING SYSTEM PROVIDED HISTORY: fall. trauma TECHNOLOGIST PROVIDED HISTORY: Ordering Physician Provided Reason for Exam: fall Acuity: Acute Type of Exam: Initial FINDINGS: BRAIN/VENTRICLES: Generalized involutional changes of the brain from identified with prominence of ventricles sulci. There is no acute intracranial hemorrhage, mass effect or midline shift. No abnormal extra-axial fluid collection. The gray-white differentiation is maintained without evidence of an acute infarct. There is no evidence of hydrocephalus. Moderate periventricular subcortical white matter hypoattenuation suggestive chronic small vessel ischemic disease. Intracranial vascular calcifications. ORBITS: The visualized portion of the orbits demonstrate no acute abnormality. SINUSES: Mild ethmoid sinus mucosal thickening. Mucous retention cyst versus polyp identified involving the right sphenoid sinus. SOFT TISSUES/SKULL:  No acute abnormality of the visualized skull or soft tissues. No acute intracranial abnormality. Moderate ethmoid sinus mucosal thickening. Ct Femur Left Wo Contrast    Result Date: 4/29/2019  EXAMINATION: CT OF THE LEFT FEMUR WITHOUT CONTRAST 4/28/2019 10:51 pm TECHNIQUE: CT of the left femur was performed without the administration of intravenous contrast.  Multiplanar reformatted images are provided for review. Dose modulation, iterative reconstruction, and/or weight based adjustment of the mA/kV was utilized to reduce the radiation dose to as low as reasonably achievable. COMPARISON: Radiographs 4/28/2019 HISTORY ORDERING SYSTEM PROVIDED HISTORY: questionable proximal fracture on xray TECHNOLOGIST PROVIDED HISTORY: Ordering Physician Provided Reason for Exam: fall Acuity: Acute Type of Exam: Initial FINDINGS: Bones: Acute left basicervical femoral neck fracture with mild impaction of fracture fragments. Soft Tissue:  Vascular calcifications. Soft tissue injury surrounding the left hip. Joint:  Moderate right hip joint space narrowing. Left femoral neck fracture with impaction of fracture fragments. Xr Hip 2-3 Vw W Pelvis Left    Result Date: 4/28/2019  EXAMINATION: SINGLE XRAY VIEW OF THE PELVIS AND 2 XRAY VIEWS LEFT HIP 4/28/2019 9:38 pm COMPARISON: None.  HISTORY: ORDERING SYSTEM PROVIDED HISTORY: trauma, pain TECHNOLOGIST PROVIDED HISTORY: trauma, pain Ordering Physician Provided Reason for Exam: fall Acuity: Acute Type of Exam: Initial FINDINGS: Questionable impacted fracture identified involving the left

## 2019-04-30 NOTE — PROGRESS NOTES
Dr Dugan Javi here and notified that surgery cancelled and they rt  Anesthesia wants sodium 144 or less

## 2019-05-01 ENCOUNTER — ANESTHESIA EVENT (OUTPATIENT)
Dept: OPERATING ROOM | Age: 79
DRG: 469 | End: 2019-05-01
Payer: MEDICARE

## 2019-05-01 LAB
ANION GAP SERPL CALCULATED.3IONS-SCNC: 9 MMOL/L (ref 9–17)
BUN BLDV-MCNC: 33 MG/DL (ref 8–23)
BUN/CREAT BLD: ABNORMAL (ref 9–20)
CALCIUM SERPL-MCNC: 8.3 MG/DL (ref 8.6–10.4)
CHLORIDE BLD-SCNC: 114 MMOL/L (ref 98–107)
CO2: 23 MMOL/L (ref 20–31)
CREAT SERPL-MCNC: 1.56 MG/DL (ref 0.7–1.2)
GFR AFRICAN AMERICAN: 52 ML/MIN
GFR NON-AFRICAN AMERICAN: 43 ML/MIN
GFR SERPL CREATININE-BSD FRML MDRD: ABNORMAL ML/MIN/{1.73_M2}
GFR SERPL CREATININE-BSD FRML MDRD: ABNORMAL ML/MIN/{1.73_M2}
GLUCOSE BLD-MCNC: 82 MG/DL (ref 70–99)
HCT VFR BLD CALC: 28.6 % (ref 40.7–50.3)
HEMOGLOBIN: 8.3 G/DL (ref 13–17)
MCH RBC QN AUTO: 26.9 PG (ref 25.2–33.5)
MCHC RBC AUTO-ENTMCNC: 29 G/DL (ref 28.4–34.8)
MCV RBC AUTO: 92.6 FL (ref 82.6–102.9)
NRBC AUTOMATED: 0 PER 100 WBC
PDW BLD-RTO: 18.4 % (ref 11.8–14.4)
PLATELET # BLD: 239 K/UL (ref 138–453)
PMV BLD AUTO: 8.3 FL (ref 8.1–13.5)
POTASSIUM SERPL-SCNC: 5 MMOL/L (ref 3.7–5.3)
RBC # BLD: 3.09 M/UL (ref 4.21–5.77)
SODIUM BLD-SCNC: 142 MMOL/L (ref 135–144)
SODIUM BLD-SCNC: 142 MMOL/L (ref 135–144)
SODIUM BLD-SCNC: 145 MMOL/L (ref 135–144)
SODIUM BLD-SCNC: 146 MMOL/L (ref 135–144)
WBC # BLD: 3.4 K/UL (ref 3.5–11.3)

## 2019-05-01 PROCEDURE — 85027 COMPLETE CBC AUTOMATED: CPT

## 2019-05-01 PROCEDURE — 84295 ASSAY OF SERUM SODIUM: CPT

## 2019-05-01 PROCEDURE — 6370000000 HC RX 637 (ALT 250 FOR IP): Performed by: NURSE PRACTITIONER

## 2019-05-01 PROCEDURE — 80048 BASIC METABOLIC PNL TOTAL CA: CPT

## 2019-05-01 PROCEDURE — 1200000000 HC SEMI PRIVATE

## 2019-05-01 PROCEDURE — 36415 COLL VENOUS BLD VENIPUNCTURE: CPT

## 2019-05-01 PROCEDURE — 6360000002 HC RX W HCPCS: Performed by: STUDENT IN AN ORGANIZED HEALTH CARE EDUCATION/TRAINING PROGRAM

## 2019-05-01 PROCEDURE — 99232 SBSQ HOSP IP/OBS MODERATE 35: CPT | Performed by: INTERNAL MEDICINE

## 2019-05-01 PROCEDURE — 6360000002 HC RX W HCPCS: Performed by: NURSE PRACTITIONER

## 2019-05-01 PROCEDURE — 2580000003 HC RX 258: Performed by: INTERNAL MEDICINE

## 2019-05-01 RX ORDER — SODIUM CHLORIDE 450 MG/100ML
INJECTION, SOLUTION INTRAVENOUS CONTINUOUS
Status: DISCONTINUED | OUTPATIENT
Start: 2019-05-01 | End: 2019-05-02

## 2019-05-01 RX ADMIN — SODIUM CHLORIDE: 4.5 INJECTION, SOLUTION INTRAVENOUS at 09:43

## 2019-05-01 RX ADMIN — Medication 2 G: at 05:16

## 2019-05-01 RX ADMIN — METOPROLOL TARTRATE 25 MG: 25 TABLET ORAL at 21:05

## 2019-05-01 RX ADMIN — MIRTAZAPINE 30 MG: 30 TABLET, FILM COATED ORAL at 21:05

## 2019-05-01 RX ADMIN — TAMSULOSIN HYDROCHLORIDE 0.4 MG: 0.4 CAPSULE ORAL at 09:55

## 2019-05-01 RX ADMIN — METOPROLOL TARTRATE 25 MG: 25 TABLET ORAL at 09:55

## 2019-05-01 RX ADMIN — LORAZEPAM 0.5 MG: 2 INJECTION INTRAMUSCULAR; INTRAVENOUS at 00:07

## 2019-05-01 RX ADMIN — DOCUSATE SODIUM 100 MG: 100 CAPSULE, LIQUID FILLED ORAL at 21:06

## 2019-05-01 NOTE — PROGRESS NOTES
Occupational Therapy Not Seen Note    DATE: 2019  Name: Bailee Diane.   : 1940  MRN: [id number]    Patient not available for Occupational Therapy due to:    Surgery/Procedure: waiting for medical clearance for surgery     Next Scheduled Treatment: check back 19    Electronically signed by GIOVANI Maynard on 2019 at 11:22 AM

## 2019-05-01 NOTE — PROGRESS NOTES
NEPHROLOGY PROGRESS NOTE      SUBJECTIVE     Urine output recorded on file 100 mL last 24 hours. Currently on IV fluids. Blood pressure stable. Renal function back to baseline. Sodium improved significantly. OBJECTIVE     Vitals:    05/01/19 0124 05/01/19 0630 05/01/19 0800 05/01/19 0955   BP: 125/87  (!) 142/90 (!) 160/96   Pulse: 65  67 74   Resp:   17    Temp:   97.5 °F (36.4 °C)    TempSrc:   Axillary    SpO2: 95%  98%    Weight:  147 lb 3.2 oz (66.8 kg)       24HR INTAKE/OUTPUT:      Intake/Output Summary (Last 24 hours) at 5/1/2019 1113  Last data filed at 5/1/2019 0412  Gross per 24 hour   Intake 4502 ml   Output 500 ml   Net 4002 ml       General appearance:Awake, alert, in no acute distress  HEENT: PERRLA  Respiratory::vesicular breath sounds,no wheeze/crackles  Cardiovascular:S1 S2 normal,no gallop or organic murmur. Abdomen:Non tender/non distended. Bowel sounds present  Extremities: No Cyanosis or Clubbing,Lower extremity edema        MEDICATIONS     Scheduled Meds:    vitamin D  50,000 Units Oral Weekly    metoprolol tartrate  25 mg Oral BID    docusate sodium  100 mg Oral Nightly    mirtazapine  30 mg Oral Nightly    tamsulosin  0.4 mg Oral Daily    sodium chloride flush  10 mL Intravenous 2 times per day     Continuous Infusions:    sodium chloride 75 mL/hr at 05/01/19 0943     PRN Meds:  LORazepam, sodium chloride flush, potassium chloride **OR** potassium alternative oral replacement **OR** potassium chloride, magnesium sulfate, magnesium hydroxide, ondansetron, nicotine, acetaminophen, oxyCODONE-acetaminophen **OR** oxyCODONE-acetaminophen, morphine **OR** morphine  Home Meds:                Medications Prior to Admission: acetaminophen (TYLENOL) 325 MG tablet, Take 650 mg by mouth every 4 hours as needed for Pain  aspirin 81 MG tablet, Take 81 mg by mouth daily  levofloxacin (LEVAQUIN) 500 MG tablet, Take 500 mg by mouth daily  hydrochlorothiazide (HYDRODIURIL) 25 MG tablet, Take 1 tablet by mouth daily  Ergocalciferol (VITAMIN D2 PO), Take by mouth  docusate sodium (COLACE) 100 MG capsule, Take 100 mg by mouth nightly  tamsulosin (FLOMAX) 0.4 MG capsule, Take 0.4 mg by mouth daily  losartan (COZAAR) 100 MG tablet, Take 100 mg by mouth daily  mirtazapine (REMERON) 30 MG tablet, Take 30 mg by mouth nightly  vitamin E 400 UNIT capsule, Take 400 Units by mouth daily    INVESTIGATIONS     Last 3 CMP:    Recent Labs     04/29/19  1607 04/30/19  0609  05/01/19  0021 05/01/19  0520 05/01/19  0715   * 151*   < > 142 146* 145*   K 5.2 5.0  --   --  5.0  --    * 124*  --   --  114*  --    CO2 16* 17*  --   --  23  --    BUN 50* 43*  --   --  33*  --    CREATININE 1.85* 1.79*  --   --  1.56*  --    CALCIUM 8.9 8.7  --   --  8.3*  --     < > = values in this interval not displayed. Last 3 CBC:  Recent Labs     04/29/19  0618 04/30/19  0745 05/01/19  0520   WBC 4.4 3.9 3.4*   RBC 3.10* 3.05* 3.09*   HGB 8.4* 8.3* 8.3*   HCT 28.5* 29.6* 28.6*   MCV 91.9 97.0 92.6   MCH 27.1 27.2 26.9   MCHC 29.5 28.0* 29.0   RDW 18.7* 19.0* 18.4*    278 239   MPV 8.8 8.8 8.3       ASSESSMENT     1. MAYITO: pre renal from poor PO and aggravated further by diuretic and ARB use - resolved  1. Hypernatremia secondary to free water def from impaired thirst and poor PO  2. CKD Stage 3: Baseline creatinine around 1.4 -1.6  3. HX of hydronephrosis and urinary retention in past  4. Left hip fracture  femoral head  5. Dementia  6. Essential HTN      PLAN     1. Changed IV fluids. Discontinue once oral intake optimized  2. Outpatient follow-up in 4-6 weeks' time  3.   Okay to resume angiotensin receptor blockers at the time of discharge if blood pressure suboptimally controlled and renal function stable    Please do not hesitate to call with questions    This note is created with the assistance of a speech-recognition program. While intending to generate a document that actually reflects the content of the visit, no guarantees can be provided that every mistake has been identified and corrected by editing    Radha Lyles MD, MRCP Alma Delia Zarate, FACP   5/1/2019 11:13 AM  [facility]

## 2019-05-01 NOTE — PROGRESS NOTES
Orthopedic Progress Note    Patient:  Gómez Arvizu. YOB: 1940     78 y.o. male    Subjective:  Patient seen and examined at bedside. Patient confused at baseline unchanged from prior examination. Confused and minimally responds to questions. Difficulty following commands. Patients surgery cancelled yesterday due to hypernatremia   No acute events overnight per nursing. Patient attempted to get out of bed overnight per sitter. NPO since midnight. Objective:   Vitals:    05/01/19 0124   BP: 125/87   Pulse: 65   Resp:    Temp:    SpO2: 95%     Gen: NAD, resting in bed comfortably in bed upon arrival     Cardiovascular: Regular rate, no dependent edema, distal pulses 2+    Respiratory: Chest symmetric, no accessory muscle use, normal respirations, no audible wheezes    LLE: Appropriate TTP over the hip. Superficial abrasion to lateral thigh. compartments soft and compressible. Moves toes spontaneously but not following commands. Endorses gross sensation to toes. Toes warm and well perfused with BCR.      Recent Labs     04/29/19  0618  04/30/19  0609 04/30/19  0745  05/01/19  0021   WBC 4.4  --   --  3.9  --   --    HGB 8.4*  --   --  8.3*  --   --    HCT 28.5*  --   --  29.6*  --   --      --   --  278  --   --    INR 0.9  --   --   --   --   --    *   < > 151*  --    < > 142   K 5.2   < > 5.0  --   --   --    BUN 53*   < > 43*  --   --   --    CREATININE 2.21*   < > 1.79*  --   --   --    GLUCOSE 78   < > 82  --   --   --     < > = values in this interval not displayed. Meds: See rec for complete list    Impression/plan: 78 y.o. male being seen for left femoral neck fracture following [address]     - NWB LLE  - Hypernatremia resolved this AM at 142.  Hgb 8.3  - Will likely plan for OR today 5/1 for CRPP vs hemiarthroplasty of the left hip if ok from medical standpoint.   - Abx OCTOR  - Pain control and medical management per primary   - NPO since MN  - Consent obtained

## 2019-05-01 NOTE — PROGRESS NOTES
[address]    Progress Note    5/1/2019    7:50 AM    Name:   Francisco Hernandez. MRN:     [id number]     Acct:      [de-identified]   Room:   [facility]8481-  IP Day:  3  Admit Date:  4/28/2019  9:26 PM    PCP:   Teressa Hernández MD  Code Status:  Full Code    Subjective:     C/C:   Chief Complaint   Patient presents with    Facial Laceration     L upper eyelid s/p fall when he was up and struck bedrail in room. Denies loc    Hip Pain     L hip. unknown if related to fall pta     Interval History Status:   No new issues overnight  Currently calm and sleeping wakes up easily and is still confused as before  Sodium has improved to 145   BUN/creatinine is trending down- 33/1.56  Brief History:   Patient has significant dementia and is a very poor historian. Admitted through ER with following history;     Fadumo Chang Jr. is a 78 y. o. male who presents to the emergency department for evaluation of a fall.  Patient was ambulating, stumbled and fell onto a bed rail and then to the ground.  Sustained a laceration by his left eyebrow.  He did not lose consciousness or vomiting after the incident but he does suffer from dementia and seems to have a little bit of confusion.  Complains of left hip pain.  Patient denies any other acute complaints or injuries at this time.  This occurred just prior to arrival at [facility].   He was found to have left hip fracture on imaging  Patient is seen by orthopedics who plans to do surgery for fractured left hip once patient is medically cleared  Talk to his nephew on the phone who confirmed that patient is staying in nursing home and has quit smoking and drinking many years back and has significant dementia             Review of Systems:   Limited information due to dementia:  Constitutional:  negative for chills, fevers, sweats, confused as before in answering  Respiratory:  negative for cough, dyspnea on exertion, shortness of breath, wheezing  Cardiovascular:  negative for chest pain, chest pressure/discomfort, lower extremity edema, palpitations  Gastrointestinal:  negative for abdominal pain, constipation, diarrhea, nausea, vomiting  Neurological:  negative for dizziness, headache    Medications: Allergies: Allergies   Allergen Reactions    Lisinopril     Peanut-Containing Drug Products        Current Meds:   Scheduled Meds:    vitamin D  50,000 Units Oral Weekly    metoprolol tartrate  25 mg Oral BID    docusate sodium  100 mg Oral Nightly    mirtazapine  30 mg Oral Nightly    tamsulosin  0.4 mg Oral Daily    sodium chloride flush  10 mL Intravenous 2 times per day     Continuous Infusions:    IV infusion builder 100 mL/hr at 19 1224     PRN Meds: LORazepam, sodium chloride flush, potassium chloride **OR** potassium alternative oral replacement **OR** potassium chloride, magnesium sulfate, magnesium hydroxide, ondansetron, nicotine, acetaminophen, oxyCODONE-acetaminophen **OR** oxyCODONE-acetaminophen, morphine **OR** morphine    Data:     Past Medical History:   has a past medical history of Arthritis, Dementia, Hypertension, and Spinal stenosis. Social History:   reports that he has quit smoking. He has never used smokeless tobacco. He reports that he does not drink alcohol or use drugs. Family History: History reviewed. No pertinent family history. Vitals:  /87   Pulse 65   Temp 97.6 °F (36.4 °C) (Oral)   Resp 16   Wt 147 lb 3.2 oz (66.8 kg)   SpO2 95%   BMI 24.50 kg/m²   Temp (24hrs), Av.7 °F (36.5 °C), Min:97.6 °F (36.4 °C), Max:97.7 °F (36.5 °C)    No results for input(s): POCGLU in the last 72 hours. I/O (24Hr):     Intake/Output Summary (Last 24 hours) at 2019 0750  Last data filed at 2019 0412  Gross per 24 hour   Intake 4502 ml   Output 500 ml   Net 4002 ml       Labs:    Hematology:  Recent Labs     19  0618 19  0745 19  6978 WBC 4.4 3.9 3.4*   HGB 8.4* 8.3* 8.3*   HCT 28.5* 29.6* 28.6*    278 239   INR 0.9  --   --      Chemistry:  Recent Labs     04/29/19  1607 04/30/19  0039 04/30/19  0609  05/01/19  0021 05/01/19  0520 05/01/19  0715   *  --  151*   < > 142 146* 145*   K 5.2  --  5.0  --   --  5.0  --    *  --  124*  --   --  114*  --    CO2 16*  --  17*  --   --  23  --    GLUCOSE 81  --  82  --   --  82  --    BUN 50*  --  43*  --   --  33*  --    CREATININE 1.85*  --  1.79*  --   --  1.56*  --    ANIONGAP 14  --  10  --   --  9  --    LABGLOM 35*  --  37*  --   --  43*  --    GFRAA 43*  --  45*  --   --  52*  --    CALCIUM 8.9  --  8.7  --   --  8.3*  --    MYOGLOBIN 216* 173*  --   --   --   --   --     < > = values in this interval not displayed. No results for input(s): PROT, LABALBU, LABA1C, W8DKNBN, T2VSATH, FT4, TSH, AST, ALT, LDH, GGT, ALKPHOS, BILITOT, BILIDIR, AMMONIA, AMYLASE, LIPASE, LACTATE, CHOL, HDL, LDLCHOLESTEROL, CHOLHDLRATIO, TRIG, VLDL, PHENYTOIN, PHENYF in the last 72 hours. Lab Results   Component Value Date/Time    SPECIAL L AC 10 12/18/2018 07:47 PM     Lab Results   Component Value Date/Time    CULTURE NO GROWTH 6 DAYS 12/18/2018 07:47 PM       Lab Results   Component Value Date    FIO2 NOT REPORTED 12/18/2018       Radiology:    Ct Head Wo Contrast    Result Date: 4/28/2019  EXAMINATION: CT OF THE HEAD WITHOUT CONTRAST  4/28/2019 10:02 pm TECHNIQUE: CT of the head was performed without the administration of intravenous contrast. Dose modulation, iterative reconstruction, and/or weight based adjustment of the mA/kV was utilized to reduce the radiation dose to as low as reasonably achievable. COMPARISON: None. HISTORY: ORDERING SYSTEM PROVIDED HISTORY: fall.  trauma TECHNOLOGIST PROVIDED HISTORY: Ordering Physician Provided Reason for Exam: fall Acuity: Acute Type of Exam: Initial FINDINGS: BRAIN/VENTRICLES: Generalized involutional changes of the brain from identified with prominence of ventricles sulci. There is no acute intracranial hemorrhage, mass effect or midline shift. No abnormal extra-axial fluid collection. The gray-white differentiation is maintained without evidence of an acute infarct. There is no evidence of hydrocephalus. Moderate periventricular subcortical white matter hypoattenuation suggestive chronic small vessel ischemic disease. Intracranial vascular calcifications. ORBITS: The visualized portion of the orbits demonstrate no acute abnormality. SINUSES: Mild ethmoid sinus mucosal thickening. Mucous retention cyst versus polyp identified involving the right sphenoid sinus. SOFT TISSUES/SKULL:  No acute abnormality of the visualized skull or soft tissues. No acute intracranial abnormality. Moderate ethmoid sinus mucosal thickening. Ct Femur Left Wo Contrast    Result Date: 4/29/2019  EXAMINATION: CT OF THE LEFT FEMUR WITHOUT CONTRAST 4/28/2019 10:51 pm TECHNIQUE: CT of the left femur was performed without the administration of intravenous contrast.  Multiplanar reformatted images are provided for review. Dose modulation, iterative reconstruction, and/or weight based adjustment of the mA/kV was utilized to reduce the radiation dose to as low as reasonably achievable. COMPARISON: Radiographs 4/28/2019 HISTORY ORDERING SYSTEM PROVIDED HISTORY: questionable proximal fracture on xray TECHNOLOGIST PROVIDED HISTORY: Ordering Physician Provided Reason for Exam: fall Acuity: Acute Type of Exam: Initial FINDINGS: Bones: Acute left basicervical femoral neck fracture with mild impaction of fracture fragments. Soft Tissue:  Vascular calcifications. Soft tissue injury surrounding the left hip. Joint:  Moderate right hip joint space narrowing. Left femoral neck fracture with impaction of fracture fragments.      Xr Hip 2-3 Vw W Pelvis Left    Result Date: 4/28/2019  EXAMINATION: SINGLE XRAY VIEW OF THE PELVIS AND 2 XRAY VIEWS LEFT HIP 4/28/2019 9:38 pm COMPARISON: None. HISTORY: ORDERING SYSTEM PROVIDED HISTORY: trauma, pain TECHNOLOGIST PROVIDED HISTORY: trauma, pain Ordering Physician Provided Reason for Exam: fall Acuity: Acute Type of Exam: Initial FINDINGS: Questionable impacted fracture identified involving the left subcapital region. Right hip is intact. No dislocation. Mild-to-moderate changes both hips. Pelvis is intact. Questionable impaction fracture involving the left subcapital region. Please correlate with exam findings. Physical Examination:        General appearance:  alert, cooperative and no distress on waking up, answering little more appropriately today  Mental Status:  Confused as before due to dementia. Lungs:  clear to auscultation bilaterally, normal effort  Heart:  regular rate and rhythm, no murmur  Abdomen:  soft, nontender, nondistended, normal bowel sounds, no masses, hepatomegaly, splenomegaly  Extremities:  no edema, redness, tenderness in the calves  Skin:  Healing possible burn marks on left hip    Assessment:        Primary Problem  Fall with fracture left femur  Hyper natremia-due to dehydration-improving with IV fluids  AK I on CK D3-due to dehydration and ARB , slowly trending down  Active Hospital Problems    Diagnosis Date Noted    Cardiac arrhythmia [I49.9] 04/29/2019     Priority: High    Hypernatremia [E87.0] 04/29/2019     Priority: High    Closed fracture of neck of femur ([facility] [S72.009A] 04/28/2019     Priority: High    MAYITO (acute kidney injury) ([address] [N17.9] 12/19/2018     Priority: High    Fall [W19. XXXA] 04/29/2019    Severe malnutrition ([address] [E43] 12/21/2018    Anemia due to stage 3 chronic kidney disease ([address] [N18.3, D63.1] 12/19/2018    Dementia [F03.90] 12/19/2018    Essential hypertension [I10] 12/19/2018    Stage 3 chronic kidney disease ([address] [N18.3] 28/57/2015    Metabolic encephalopathy [C22.72] 12/18/2018       Plan:        1. Fluid has been changed to 0. 45 saline  2.  He had a echo before recently so new echo was canceled by cardiology  3. Surgery rescheduled for tomorrow, cleared by nephrology , cardiology and anesthesia  4. Monitor electrolytes  5.  Start diet today    Daisy Haro MD  5/1/2019  7:50 AM

## 2019-05-01 NOTE — ANESTHESIA PRE PROCEDURE
Department of Anesthesiology  Preprocedure Note       Name:  Merrick Anaya. Age:  78 y.o.  :  1940                                          MRN:  [id number]         Date:  2019      Surgeon: Arsh Cheng):  Chandan Velez MD    Procedure: HIP PINNING - SYNTHES, C-ARM (Left )    Medications prior to admission:   Prior to Admission medications    Medication Sig Start Date End Date Taking?  Authorizing Provider   acetaminophen (TYLENOL) 325 MG tablet Take 650 mg by mouth every 4 hours as needed for Pain   Yes Historical Provider, MD   aspirin 81 MG tablet Take 81 mg by mouth daily   Yes Historical Provider, MD   levofloxacin (LEVAQUIN) 500 MG tablet Take 500 mg by mouth daily   Yes Historical Provider, MD   hydrochlorothiazide (HYDRODIURIL) 25 MG tablet Take 1 tablet by mouth daily 18  Yes Yoselin Martins MD   Ergocalciferol (VITAMIN D2 PO) Take by mouth   Yes Historical Provider, MD   docusate sodium (COLACE) 100 MG capsule Take 100 mg by mouth nightly   Yes Historical Provider, MD   tamsulosin (FLOMAX) 0.4 MG capsule Take 0.4 mg by mouth daily   Yes Historical Provider, MD   losartan (COZAAR) 100 MG tablet Take 100 mg by mouth daily    Historical Provider, MD   mirtazapine (REMERON) 30 MG tablet Take 30 mg by mouth nightly    Historical Provider, MD   vitamin E 400 UNIT capsule Take 400 Units by mouth daily    Historical Provider, MD       Current medications:    Current Facility-Administered Medications   Medication Dose Route Frequency Provider Last Rate Last Dose    0.45 % sodium chloride infusion   Intravenous Continuous Benjie Yanpalomo Cain MD 75 mL/hr at 19 0943      LORazepam (ATIVAN) injection 0.5 mg  0.5 mg Intravenous Q6H PRN CHRISTIANO Bear - CNP   0.5 mg at 19 0007    vitamin D (ERGOCALCIFEROL) capsule 50,000 Units  50,000 Units Oral Weekly Wayne Barton MD   50,000 Units at 19    metoprolol tartrate (LOPRESSOR) tablet 25 mg  25 mg Oral BID Alohsissy Lozano CHRISTIANO Becker - CNP   25 mg at 05/01/19 0955    docusate sodium (COLACE) capsule 100 mg  100 mg Oral Nightly CHRISTIANO May - CNP   100 mg at 04/30/19 2042    mirtazapine (REMERON) tablet 30 mg  30 mg Oral Nightly Talia Roberto, CHRISTIANO - CNP   30 mg at 04/30/19 2042    tamsulosin (FLOMAX) capsule 0.4 mg  0.4 mg Oral Daily CHRISTIANO May - CNP   0.4 mg at 05/01/19 0955    sodium chloride flush 0.9 % injection 10 mL  10 mL Intravenous 2 times per day CHRISTIANO May - CNP        sodium chloride flush 0.9 % injection 10 mL  10 mL Intravenous PRN CHRISTIANO May - CNP        potassium chloride (KLOR-CON M) extended release tablet 40 mEq  40 mEq Oral PRN CHRISTIANO May - CNP        Or    potassium bicarb-citric acid (EFFER-K) effervescent tablet 40 mEq  40 mEq Oral PRN CHRISTIANO May - CNP        Or    potassium chloride 10 mEq/100 mL IVPB (Peripheral Line)  10 mEq Intravenous PRN CHRISTIANO May - CNP        magnesium sulfate 1 g in dextrose 5% 100 mL IVPB  1 g Intravenous PRN CHRISTIANO May - CNP        magnesium hydroxide (MILK OF MAGNESIA) 400 MG/5ML suspension 30 mL  30 mL Oral Daily PRN CHRISTIANO May - CNP        ondansetron [facility]) injection 4 mg  4 mg Intravenous Q6H PRN CHRISTIANO May CNP        nicotine (NICODERM CQ) 21 MG/24HR 1 patch  1 patch Transdermal Daily PRN CHRISTIANO May - [place]        acetaminophen (TYLENOL) tablet 650 mg  650 mg Oral Q4H PRN CHRISTIANO May CNP        oxyCODONE-acetaminophen (PERCOCET) 5-325 MG per tablet 1 tablet  1 tablet Oral Q4H PRN CHRISTIANO May - CNP   1 tablet at 04/30/19 1629    Or    oxyCODONE-acetaminophen (PERCOCET) 5-325 MG per tablet 2 tablet  2 tablet Oral Q4H PRN CHRISTIANO May - CNP        morphine (PF) injection 2 mg  2 mg Intravenous Q2H PRN Clement Embs, APRN - CNP        Or    morphine injection 4 mg  4 mg Intravenous Q2H PRN Clement Embs, APRN - CNP           Allergies: Allergies   Allergen Reactions    Lisinopril     Peanut-Containing Drug Products        Problem List:    Patient Active Problem List   Diagnosis Code    Metabolic encephalopathy N57.63    Stage 3 chronic kidney disease ([facility] N18.3    Anemia due to stage 3 chronic kidney disease (HCC) N18.3, D63.1    MAYITO (acute kidney injury) ([facility] N17.9    Dementia F03.90    Essential hypertension I10    Arthritis M19.90    Severe malnutrition ([facility] E43    Closed fracture of neck of left femur ([facility] S72.002A    Closed fracture of neck of femur ([facility] S72.009A    Fall W19. Reginald Correia    Cardiac arrhythmia I49.9    Hypernatremia E87.0       Past Medical History:        Diagnosis Date    Arthritis     Dementia     Hypertension     Spinal stenosis        Past Surgical History:        Procedure Laterality Date    APPENDECTOMY      COLONOSCOPY      ENDOSCOPY, COLON, DIAGNOSTIC      EYE SURGERY      ROTATOR CUFF REPAIR Left     X2       Social History:    Social History     Tobacco Use    Smoking status: Former Smoker    Smokeless tobacco: Never Used   Substance Use Topics    Alcohol use:  No                                Counseling given: Not Answered      Vital Signs (Current):   Vitals:    05/01/19 0124 05/01/19 0630 05/01/19 0800 05/01/19 0955   BP: 125/87  (!) 142/90 (!) 160/96   Pulse: 65  67 74   Resp:   17    Temp:   36.4 °C (97.5 °F)    TempSrc:   Axillary    SpO2: 95%  98%    Weight:  147 lb 3.2 oz (66.8 kg)                                                BP Readings from Last 3 Encounters:   05/01/19 (!) 160/96   12/26/18 (!) 145/82       NPO Status:                                                                                 BMI:   Wt Readings from Last 3 Encounters:   05/01/19 147 lb 3.2 oz (66.8 kg)   12/26/18 145 lb 4.8 oz (65.9 kg)     Body mass index is 24.5 kg/m². CBC:   Lab Results   Component Value Date    WBC 3.4 05/01/2019    RBC 3.09 05/01/2019    HGB 8.3 05/01/2019    HCT 28.6 05/01/2019    MCV 92.6 05/01/2019    RDW 18.4 05/01/2019     05/01/2019       CMP:   Lab Results   Component Value Date     05/01/2019    K 5.0 05/01/2019     05/01/2019    CO2 23 05/01/2019    BUN 33 05/01/2019    CREATININE 1.56 05/01/2019    GFRAA 52 05/01/2019    LABGLOM 43 05/01/2019    GLUCOSE 82 05/01/2019    PROT 6.7 12/19/2018    CALCIUM 8.3 05/01/2019    BILITOT 0.32 12/19/2018    ALKPHOS 60 12/19/2018    AST 74 12/19/2018    ALT 19 12/19/2018       POC Tests: No results for input(s): POCGLU, POCNA, POCK, POCCL, POCBUN, POCHEMO, POCHCT in the last 72 hours. Coags:   Lab Results   Component Value Date    PROTIME 9.9 04/29/2019    INR 0.9 04/29/2019    APTT 30.3 12/18/2018       HCG (If Applicable): No results found for: PREGTESTUR, PREGSERUM, HCG, HCGQUANT     ABGs: No results found for: PHART, PO2ART, IYL9BGB, XUG5BCO, BEART, W5MTESSH     Type & Screen (If Applicable):  No results found for: [facility]    Anesthesia Evaluation  Nursing notes reviewed no history of anesthetic complications:   Airway: Mallampati: II        Dental: normal exam         Pulmonary:   (+) decreased breath sounds,                             Cardiovascular:    (+) hypertension:,       ECG reviewed  Rhythm: regular        Cleared by cardiology           ROS comment: EKG:  No acute changes  Cardiology note appreciated. Still think that his CV risks would be moderate at least.  ECHO: ? 4/19 : EF 45% per cardiology note. Troponin T explained from CKD. electrolytes improved     Neuro/Psych:   (+) psychiatric history:             ROS comment: Dementia  Metabolic encephalopathy  Responds to verbal stimuli.   Attempts to answer questions GI/Hepatic/Renal:   (+) renal disease: CRI,           Endo/Other:                     Abdominal:           Vascular:

## 2019-05-01 NOTE — PROGRESS NOTES
Physical Therapy  DATE: 2019    NAME: Ashley Edmondson. MRN: [id number]   : 1940    Patient not seen this date for Physical Therapy due to:  [] Blood transfusion in progress  [] Hemodialysis  []  Patient Declined  [] Spine Precautions   [] Strict Bedrest  [] Surgery/ Procedure  [] Testing      [x] Other- Awaiting medical clearance for OR. PT will check back as time allows. [] PT being discontinued at this time. Patient independent. No further needs. [] PT being discontinued at this time as the patient has been transferred to palliative care. No further needs.     Brooklynn Alejo, PT

## 2019-05-01 NOTE — CARE COORDINATION
Care Transition  Met with [facility] Led[facility] and [facility] may [facility] at any time.

## 2019-05-01 NOTE — FLOWSHEET NOTE
 Spiritual Assessment:   Patient is a 78year old male who is having surgery tomorrow for a broken hip. Spiritual assessment is incomplete as the patient is mostly unresponsive.  Intervention:   I visited the patient for pre surgery support. He was not able to open his eyes to look at me. I spoke to him rather loudly and he heard me. I asked if I could call him \"Mike\" and he said \"yes\" though his voice was barely discernable. I tried a few questions and he did not respond. I asked if I could pray for him and he said, \"yes\". When the prayer was finished he was asleep.  Outcome:   I spoke to the RN who told me that the patient has no family in the area. He has a niece and nephew in [place]. He is cared for by a nursing aid.        05/01/19 1404   Encounter Summary   Services provided to: Patient   Place of Baptism Unknown   Contact Bahai No   Continue Visiting   (5/1/19)   Complexity of Encounter Low   Length of Encounter 15 minutes   Routine   Type Pre-procedure   Assessment Passive   Intervention Nurtured hope;Prayer;Sustaining presence/ Ministry of presence

## 2019-05-01 NOTE — PLAN OF CARE
Problem: Falls - Risk of:  Goal: Will remain free from falls  Description  Will remain free from falls  5/1/2019 0206 by Sabina De La Torre RN  Outcome: Ongoing     Problem: Falls - Risk of:  Goal: Absence of physical injury  Description  Absence of physical injury  5/1/2019 0206 by Sabina De La Torre RN  Outcome: Ongoing     Problem: Risk for Impaired Skin Integrity  Goal: Tissue integrity - skin and mucous membranes  Description  Structural intactness and normal physiological function of skin and  mucous membranes.   5/1/2019 0206 by Sabina De La Torre RN  Outcome: Ongoing     Problem: Confusion - Acute:  Goal: Absence of continued neurological deterioration signs and symptoms  Description  Absence of continued neurological deterioration signs and symptoms  5/1/2019 0206 by Sabina De La Torre RN  Outcome: Ongoing     Problem: Confusion - Acute:  Goal: Mental status will be restored to baseline  Description  Mental status will be restored to baseline  5/1/2019 0206 by Sabina De La Torre RN  Outcome: Ongoing

## 2019-05-02 ENCOUNTER — APPOINTMENT (OUTPATIENT)
Dept: GENERAL RADIOLOGY | Age: 79
DRG: 469 | End: 2019-05-02
Payer: MEDICARE

## 2019-05-02 ENCOUNTER — ANESTHESIA (OUTPATIENT)
Dept: OPERATING ROOM | Age: 79
DRG: 469 | End: 2019-05-02
Payer: MEDICARE

## 2019-05-02 VITALS — TEMPERATURE: 94.2 F | DIASTOLIC BLOOD PRESSURE: 48 MMHG | OXYGEN SATURATION: 100 % | SYSTOLIC BLOOD PRESSURE: 109 MMHG

## 2019-05-02 PROBLEM — S72.002A CLOSED DISPLACED FRACTURE OF LEFT FEMORAL NECK (HCC): Status: ACTIVE | Noted: 2019-04-28

## 2019-05-02 LAB
ANION GAP SERPL CALCULATED.3IONS-SCNC: 13 MMOL/L (ref 9–17)
BUN BLDV-MCNC: 30 MG/DL (ref 8–23)
BUN/CREAT BLD: ABNORMAL (ref 9–20)
CALCIUM SERPL-MCNC: 8.6 MG/DL (ref 8.6–10.4)
CHLORIDE BLD-SCNC: 109 MMOL/L (ref 98–107)
CO2: 20 MMOL/L (ref 20–31)
CREAT SERPL-MCNC: 1.53 MG/DL (ref 0.7–1.2)
GFR AFRICAN AMERICAN: 53 ML/MIN
GFR NON-AFRICAN AMERICAN: 44 ML/MIN
GFR SERPL CREATININE-BSD FRML MDRD: ABNORMAL ML/MIN/{1.73_M2}
GFR SERPL CREATININE-BSD FRML MDRD: ABNORMAL ML/MIN/{1.73_M2}
GLUCOSE BLD-MCNC: 66 MG/DL (ref 70–99)
HCT VFR BLD CALC: 30.1 % (ref 40.7–50.3)
HEMOGLOBIN: 9.2 G/DL (ref 13–17)
MCH RBC QN AUTO: 27.3 PG (ref 25.2–33.5)
MCHC RBC AUTO-ENTMCNC: 30.6 G/DL (ref 28.4–34.8)
MCV RBC AUTO: 89.3 FL (ref 82.6–102.9)
NRBC AUTOMATED: 0 PER 100 WBC
PDW BLD-RTO: 17.6 % (ref 11.8–14.4)
PLATELET # BLD: 259 K/UL (ref 138–453)
PMV BLD AUTO: 8.8 FL (ref 8.1–13.5)
POTASSIUM SERPL-SCNC: 5.2 MMOL/L (ref 3.7–5.3)
RBC # BLD: 3.37 M/UL (ref 4.21–5.77)
SODIUM BLD-SCNC: 140 MMOL/L (ref 135–144)
SODIUM BLD-SCNC: 141 MMOL/L (ref 135–144)
SODIUM BLD-SCNC: 142 MMOL/L (ref 135–144)
WBC # BLD: 3.6 K/UL (ref 3.5–11.3)

## 2019-05-02 PROCEDURE — 85027 COMPLETE CBC AUTOMATED: CPT

## 2019-05-02 PROCEDURE — 6370000000 HC RX 637 (ALT 250 FOR IP): Performed by: STUDENT IN AN ORGANIZED HEALTH CARE EDUCATION/TRAINING PROGRAM

## 2019-05-02 PROCEDURE — 2580000003 HC RX 258: Performed by: INTERNAL MEDICINE

## 2019-05-02 PROCEDURE — C1776 JOINT DEVICE (IMPLANTABLE): HCPCS | Performed by: ORTHOPAEDIC SURGERY

## 2019-05-02 PROCEDURE — 84295 ASSAY OF SERUM SODIUM: CPT

## 2019-05-02 PROCEDURE — 73501 X-RAY EXAM HIP UNI 1 VIEW: CPT

## 2019-05-02 PROCEDURE — 6360000002 HC RX W HCPCS: Performed by: NURSE ANESTHETIST, CERTIFIED REGISTERED

## 2019-05-02 PROCEDURE — 7100000001 HC PACU RECOVERY - ADDTL 15 MIN: Performed by: ORTHOPAEDIC SURGERY

## 2019-05-02 PROCEDURE — 73502 X-RAY EXAM HIP UNI 2-3 VIEWS: CPT

## 2019-05-02 PROCEDURE — 3600000014 HC SURGERY LEVEL 4 ADDTL 15MIN: Performed by: ORTHOPAEDIC SURGERY

## 2019-05-02 PROCEDURE — 51701 INSERT BLADDER CATHETER: CPT

## 2019-05-02 PROCEDURE — 2709999900 HC NON-CHARGEABLE SUPPLY: Performed by: ORTHOPAEDIC SURGERY

## 2019-05-02 PROCEDURE — 2500000003 HC RX 250 WO HCPCS: Performed by: NURSE ANESTHETIST, CERTIFIED REGISTERED

## 2019-05-02 PROCEDURE — 7100000000 HC PACU RECOVERY - FIRST 15 MIN: Performed by: ORTHOPAEDIC SURGERY

## 2019-05-02 PROCEDURE — 3600000004 HC SURGERY LEVEL 4 BASE: Performed by: ORTHOPAEDIC SURGERY

## 2019-05-02 PROCEDURE — 2580000003 HC RX 258: Performed by: STUDENT IN AN ORGANIZED HEALTH CARE EDUCATION/TRAINING PROGRAM

## 2019-05-02 PROCEDURE — 27125 PARTIAL HIP REPLACEMENT: CPT | Performed by: ORTHOPAEDIC SURGERY

## 2019-05-02 PROCEDURE — 6360000002 HC RX W HCPCS: Performed by: STUDENT IN AN ORGANIZED HEALTH CARE EDUCATION/TRAINING PROGRAM

## 2019-05-02 PROCEDURE — 0SRS01A REPLACEMENT OF LEFT HIP JOINT, FEMORAL SURFACE WITH METAL SYNTHETIC SUBSTITUTE, UNCEMENTED, OPEN APPROACH: ICD-10-PCS | Performed by: ORTHOPAEDIC SURGERY

## 2019-05-02 PROCEDURE — 2580000003 HC RX 258: Performed by: NURSE ANESTHETIST, CERTIFIED REGISTERED

## 2019-05-02 PROCEDURE — 36415 COLL VENOUS BLD VENIPUNCTURE: CPT

## 2019-05-02 PROCEDURE — 51798 US URINE CAPACITY MEASURE: CPT

## 2019-05-02 PROCEDURE — 6360000002 HC RX W HCPCS: Performed by: NURSE PRACTITIONER

## 2019-05-02 PROCEDURE — 1200000000 HC SEMI PRIVATE

## 2019-05-02 PROCEDURE — 3700000001 HC ADD 15 MINUTES (ANESTHESIA): Performed by: ORTHOPAEDIC SURGERY

## 2019-05-02 PROCEDURE — 80048 BASIC METABOLIC PNL TOTAL CA: CPT

## 2019-05-02 PROCEDURE — 3700000000 HC ANESTHESIA ATTENDED CARE: Performed by: ORTHOPAEDIC SURGERY

## 2019-05-02 PROCEDURE — 99232 SBSQ HOSP IP/OBS MODERATE 35: CPT | Performed by: INTERNAL MEDICINE

## 2019-05-02 DEVICE — TANDEM UNIPOLAR 12/14 TAPER SLEEVE                                    + 0
Type: IMPLANTABLE DEVICE | Site: HIP | Status: FUNCTIONAL
Brand: TANDEM

## 2019-05-02 DEVICE — TANDEM UNIPOLAR HEAD 48MM
Type: IMPLANTABLE DEVICE | Site: HIP | Status: FUNCTIONAL
Brand: TANDEM

## 2019-05-02 DEVICE — IMPLANTABLE DEVICE: Type: IMPLANTABLE DEVICE | Site: HIP | Status: FUNCTIONAL

## 2019-05-02 DEVICE — CONQUEST FX FEMORAL COMPONENT SIZE 11
Type: IMPLANTABLE DEVICE | Site: HIP | Status: FUNCTIONAL
Brand: CONQUEST FX

## 2019-05-02 RX ORDER — PROPOFOL 10 MG/ML
INJECTION, EMULSION INTRAVENOUS CONTINUOUS PRN
Status: DISCONTINUED | OUTPATIENT
Start: 2019-05-02 | End: 2019-05-02 | Stop reason: SDUPTHER

## 2019-05-02 RX ORDER — DEXTROSE MONOHYDRATE 50 MG/ML
100 INJECTION, SOLUTION INTRAVENOUS PRN
Status: DISCONTINUED | OUTPATIENT
Start: 2019-05-02 | End: 2019-05-08 | Stop reason: HOSPADM

## 2019-05-02 RX ORDER — DEXTROSE MONOHYDRATE 25 G/50ML
12.5 INJECTION, SOLUTION INTRAVENOUS PRN
Status: DISCONTINUED | OUTPATIENT
Start: 2019-05-02 | End: 2019-05-08 | Stop reason: HOSPADM

## 2019-05-02 RX ORDER — SODIUM CHLORIDE, SODIUM LACTATE, POTASSIUM CHLORIDE, CALCIUM CHLORIDE 600; 310; 30; 20 MG/100ML; MG/100ML; MG/100ML; MG/100ML
INJECTION, SOLUTION INTRAVENOUS CONTINUOUS PRN
Status: DISCONTINUED | OUTPATIENT
Start: 2019-05-02 | End: 2019-05-02 | Stop reason: SDUPTHER

## 2019-05-02 RX ORDER — MIDAZOLAM HYDROCHLORIDE 1 MG/ML
INJECTION INTRAMUSCULAR; INTRAVENOUS PRN
Status: DISCONTINUED | OUTPATIENT
Start: 2019-05-02 | End: 2019-05-02 | Stop reason: SDUPTHER

## 2019-05-02 RX ORDER — FENTANYL CITRATE 50 UG/ML
INJECTION, SOLUTION INTRAMUSCULAR; INTRAVENOUS PRN
Status: DISCONTINUED | OUTPATIENT
Start: 2019-05-02 | End: 2019-05-02 | Stop reason: SDUPTHER

## 2019-05-02 RX ORDER — GLUCAGON 1 MG/ML
1 KIT INJECTION PRN
Status: DISCONTINUED | OUTPATIENT
Start: 2019-05-02 | End: 2019-05-08 | Stop reason: HOSPADM

## 2019-05-02 RX ORDER — SODIUM CHLORIDE 9 MG/ML
INJECTION, SOLUTION INTRAVENOUS CONTINUOUS
Status: DISCONTINUED | OUTPATIENT
Start: 2019-05-02 | End: 2019-05-04

## 2019-05-02 RX ORDER — BUPIVACAINE HYDROCHLORIDE 7.5 MG/ML
INJECTION, SOLUTION INTRASPINAL PRN
Status: DISCONTINUED | OUTPATIENT
Start: 2019-05-02 | End: 2019-05-02 | Stop reason: SDUPTHER

## 2019-05-02 RX ORDER — NICOTINE POLACRILEX 4 MG
15 LOZENGE BUCCAL PRN
Status: DISCONTINUED | OUTPATIENT
Start: 2019-05-02 | End: 2019-05-08 | Stop reason: HOSPADM

## 2019-05-02 RX ADMIN — Medication 2 G: at 18:12

## 2019-05-02 RX ADMIN — SODIUM CHLORIDE, POTASSIUM CHLORIDE, SODIUM LACTATE AND CALCIUM CHLORIDE: 600; 310; 30; 20 INJECTION, SOLUTION INTRAVENOUS at 10:01

## 2019-05-02 RX ADMIN — PHENYLEPHRINE HYDROCHLORIDE 50 MCG/MIN: 10 INJECTION INTRAVENOUS at 10:46

## 2019-05-02 RX ADMIN — FENTANYL CITRATE 25 MCG: 50 INJECTION INTRAMUSCULAR; INTRAVENOUS at 11:00

## 2019-05-02 RX ADMIN — Medication 2 G: at 10:30

## 2019-05-02 RX ADMIN — MORPHINE SULFATE 2 MG: 2 INJECTION, SOLUTION INTRAMUSCULAR; INTRAVENOUS at 15:53

## 2019-05-02 RX ADMIN — LORAZEPAM 0.5 MG: 2 INJECTION INTRAMUSCULAR; INTRAVENOUS at 00:49

## 2019-05-02 RX ADMIN — PHENYLEPHRINE HYDROCHLORIDE 200 MCG: 10 INJECTION INTRAVENOUS at 10:25

## 2019-05-02 RX ADMIN — BUPIVACAINE HYDROCHLORIDE IN DEXTROSE 1.8 ML: 7.5 INJECTION, SOLUTION SUBARACHNOID at 10:13

## 2019-05-02 RX ADMIN — MIDAZOLAM HYDROCHLORIDE 1 MG: 1 INJECTION, SOLUTION INTRAMUSCULAR; INTRAVENOUS at 10:20

## 2019-05-02 RX ADMIN — SODIUM CHLORIDE, POTASSIUM CHLORIDE, SODIUM LACTATE AND CALCIUM CHLORIDE: 600; 310; 30; 20 INJECTION, SOLUTION INTRAVENOUS at 10:17

## 2019-05-02 RX ADMIN — PHENYLEPHRINE HYDROCHLORIDE 100 MCG: 10 INJECTION INTRAVENOUS at 10:46

## 2019-05-02 RX ADMIN — SODIUM CHLORIDE: 9 INJECTION, SOLUTION INTRAVENOUS at 13:16

## 2019-05-02 RX ADMIN — FENTANYL CITRATE 20 MCG: 50 INJECTION INTRAMUSCULAR; INTRAVENOUS at 10:13

## 2019-05-02 RX ADMIN — Medication 1 G: at 10:30

## 2019-05-02 RX ADMIN — PHENYLEPHRINE HYDROCHLORIDE 150 MCG: 10 INJECTION INTRAVENOUS at 10:40

## 2019-05-02 RX ADMIN — METOPROLOL TARTRATE 25 MG: 25 TABLET ORAL at 20:02

## 2019-05-02 RX ADMIN — DOCUSATE SODIUM 100 MG: 100 CAPSULE, LIQUID FILLED ORAL at 20:02

## 2019-05-02 RX ADMIN — MIRTAZAPINE 30 MG: 30 TABLET, FILM COATED ORAL at 20:02

## 2019-05-02 RX ADMIN — PHENYLEPHRINE HYDROCHLORIDE 100 MCG: 10 INJECTION INTRAVENOUS at 10:32

## 2019-05-02 RX ADMIN — MORPHINE SULFATE 2 MG: 2 INJECTION, SOLUTION INTRAMUSCULAR; INTRAVENOUS at 20:35

## 2019-05-02 RX ADMIN — Medication 1 G: at 11:08

## 2019-05-02 RX ADMIN — MIDAZOLAM HYDROCHLORIDE 1 MG: 1 INJECTION, SOLUTION INTRAMUSCULAR; INTRAVENOUS at 10:04

## 2019-05-02 RX ADMIN — PHENYLEPHRINE HYDROCHLORIDE 150 MCG: 10 INJECTION INTRAVENOUS at 10:33

## 2019-05-02 RX ADMIN — PROPOFOL 30 MCG/KG/MIN: 10 INJECTION, EMULSION INTRAVENOUS at 10:24

## 2019-05-02 RX ADMIN — PHENYLEPHRINE HYDROCHLORIDE 100 MCG: 10 INJECTION INTRAVENOUS at 11:16

## 2019-05-02 ASSESSMENT — PULMONARY FUNCTION TESTS
PIF_VALUE: 0
PIF_VALUE: 1
PIF_VALUE: 0
PIF_VALUE: 2
PIF_VALUE: 0
PIF_VALUE: 1
PIF_VALUE: 0
PIF_VALUE: 1
PIF_VALUE: 0
PIF_VALUE: 1
PIF_VALUE: 0
PIF_VALUE: 1
PIF_VALUE: 0

## 2019-05-02 ASSESSMENT — PAIN SCALES - PAIN ASSESSMENT IN ADVANCED DEMENTIA (PAINAD)
BREATHING: 0
BREATHING: 0
TOTALSCORE: 0
CONSOLABILITY: 0
TOTALSCORE: 0
BODYLANGUAGE: 0
FACIALEXPRESSION: 0
BREATHING: 0
CONSOLABILITY: 0
NEGVOCALIZATION: 0
TOTALSCORE: 0
TOTALSCORE: 0
NEGVOCALIZATION: 0
BODYLANGUAGE: 0
NEGVOCALIZATION: 0
FACIALEXPRESSION: 0
CONSOLABILITY: 0
CONSOLABILITY: 0
BODYLANGUAGE: 0
BREATHING: 0
FACIALEXPRESSION: 0
BODYLANGUAGE: 0
BODYLANGUAGE: 0
TOTALSCORE: 0
NEGVOCALIZATION: 0
CONSOLABILITY: 0
FACIALEXPRESSION: 0
NEGVOCALIZATION: 0
FACIALEXPRESSION: 0
BREATHING: 0

## 2019-05-02 ASSESSMENT — PAIN SCALES - GENERAL
PAINLEVEL_OUTOF10: 2
PAINLEVEL_OUTOF10: 7
PAINLEVEL_OUTOF10: 6

## 2019-05-02 NOTE — OP NOTE
[address] [facility]                                OPERATIVE REPORT    PATIENT NAME: Breanna Lara                        :        1940  MED REC NO:   [id number]                             ROOM:       0807  ACCOUNT NO:   [de-identified]                           ADMIT DATE: 2019  PROVIDER:     Sadia Fuentes    DATE OF PROCEDURE:  2019    PREOPERATIVE DIAGNOSIS:  Displaced left femoral neck fracture. POSTOPERATIVE DIAGNOSIS:  Displaced left femoral neck fracture. PROCEDURE:  Left hip hemiarthroplasty using [employer]  size 11 stem with a 48 head, 0 neck length, unipolar. SURGEON:  Sadia Edouard. MD Gina    ESTIMATED BLOOD LOSS:  50 mL. BRIEF CLINICAL HISTORY:  The patient is a 57-year-old male, multiple  medical problems,  physiologically much older, presented with a  valgus-impacted, displaced left femoral neck fracture. Felt secondary  to his comorbidities that he would do best with hemiarthroplasty. He  understands the risk of the procedure including infection requiring  operative irrigation and debridement, the risk of dislocation requiring  revision, the risk of fracture requiring further surgery. He  understands the risk of leg-length inequality; the risk of damage to  neurovascular structures, namely the sciatic nerve; also understands the  risk of blood loss and DVT. OPERATIVE NOTE:  The patient was taken to the operating room on  2019, placed supine on the OR table. Anesthesia was induced via  spinal anesthetic. Given 2 gm Ancef IV for prophylaxis. He was then  carefully placed in the right lateral decubitus position and held in  place with a pegboard. Axillary roll was placed. Lower extremity was  well padded at the knee and ankle with foam.  EKG button was placed on  the down leg to aid in leg length determination.   The left hip was then  draped free, then skin with skin staples. Sterile compressive dressing was applied. The patient was allowed to  emerge from sedation, transferred back to his hospital bed, and taken to  the recovery room in stable and good condition.         Sylvain Carter    D: 05/02/2019 11:21:52       T: 05/02/2019 13:31:43     [email or web address]  Job#: [id number]     Doc#: [id number]    CC:

## 2019-05-02 NOTE — ANESTHESIA POSTPROCEDURE EVALUATION
Department of Anesthesiology  Postprocedure Note    Patient: Td Selby MRN: [id number]  YOB: 1940  Date of evaluation: 2019  Time:  12:24 PM     Procedure Summary     Date:  19 Room / Location:  [place] [address] / [facility] OR    Anesthesia Start:  1001 Anesthesia Stop:  1132    Procedure:  LEFT HIP ERIN-ARTHROPLASTY (Left Hip) Diagnosis:  (LEFT HIP FRACTURE)    Surgeon:  Gelacio Eaton MD Responsible Provider:  Fiorella Parker MD    Anesthesia Type:  general ASA Status:  4          Anesthesia Type: general    Jamie Phase I: Jamie Score: 6    Jamie Phase II:      Last vitals: Reviewed and per EMR flowsheets.        Anesthesia Post Evaluation   Vital Signs (Current)   Vitals:    19 1215   BP: (!) 91/54   Pulse: 53   Resp: 11   Temp:    SpO2: 100%     Vital Signs Statistics (for past 48 hrs)     Temp  Av.8 °F (34.3 °C)  Min: 91.5 °F (33.1 °C)   Min taken time: 19 1023  Max: 98.3 °F (36.8 °C)   Max taken time: 19 2120  Pulse  Av.5  Min: 48   Min taken time: 19 1215  Max: 89   Max taken time: 19 2120  Resp  Av.6  Min: 0   Min taken time: 19 1135  Max: 21   Max taken time: 19 1031  BP  Min: 61/32   Min taken time: 19 1024  Max: 162/74   Max taken time: 19 1623  ABP (Arterial line BP)  Min: 136/26   Min taken time: 19 1003  Max: 136/26   Max taken time: 19 1003  SpO2  Av.3 %  Min: 92 %   Min taken time: 19 1935  Max: 100 %   Max taken time: 19 1120    BP Readings from Last 3 Encounters:   19 (!) 91/54   18 (!) 145/82             Level of Consciousness:  Awake    Respiratory:  Stable    Airway :   Patent    Oxygen Saturation:  Stable    Cardiovascular:  Stable    Hydration:  Adequate    PONV:  Stable    Post-op Pain:  Adequate analgesia    Post-op Assessment:  No apparent anesthetic complications    Additional Follow-Up / Treatment / Comment:  None

## 2019-05-02 NOTE — PROGRESS NOTES
Occupational Therapy Not Seen Note    DATE: 2019  Name: René Cueto.   : 1940  MRN: [id number]    Patient not available for Occupational Therapy due to:    Surgery/Procedure: plan for OR @ 1000    Next Scheduled Treatment: check back 5/3/19    Electronically signed by GIOVANI Pope on 2019 at 8:20 AM

## 2019-05-02 NOTE — DISCHARGE INSTR - COC
Continuity of Care Form    Patient Name: Elia Arreaga. :  1940  MRN:  [id number]    Admit date:  2019  Discharge date:      Code Status Order: Full Code   Advance Directives:   [facility] Documentation     Date/Time Healthcare Directive Type of Healthcare Directive Copy in [facility] [address] Agent's Name Healthcare Agent's Phone Number    19 0996  Unknown, patient unable to respond due to medical condition -- -- -- -- --          Admitting Physician:  Nicko Willett MD  PCP: Isela Gilliland MD    Discharging Nurse: [facility] Unit/Room#: 4551/6429-29  Discharging Unit Phone Number: [phone]    Emergency Contact:   Extended Emergency Contact Information  Primary Emergency Contact: Luis Manuel Awad   [address] Phone: [phone]  Relation: Niece/Nephew    Past Surgical History:  Past Surgical History:   Procedure Laterality Date    APPENDECTOMY      COLONOSCOPY      ENDOSCOPY, COLON, DIAGNOSTIC      EYE SURGERY      HIP PINNING Left 2019    LEFT HIP ERIN-ARTHROPLASTY performed by Kush Fang MD at [facility] Left 2019     LEFT HIP ERIN-ARTHROPLASTY     ROTATOR CUFF REPAIR Left     X2       Immunization History: There is no immunization history on file for this patient. Active Problems:  Patient Active Problem List   Diagnosis Code    Metabolic encephalopathy F31.33    Stage 3 chronic kidney disease (HCC) N18.3    Anemia due to stage 3 chronic kidney disease (HCC) N18.3, D63.1    MAYITO (acute kidney injury) ([address] N17.9    Dementia F03.90    Essential hypertension I10    Arthritis M19.90    Severe malnutrition ([address] E43    Closed fracture of neck of left femur ([address] S72.002A    Closed displaced fracture of left femoral neck ([address] S72.002A    Fall W19. Chilo Taylor    Cardiac arrhythmia I49.9    Hypernatremia E87.0    Witnessed seizure-like activity (HCC) R56.9  Cognitive impairment R41.89       Isolation/Infection:   Isolation          No Isolation            Nurse Assessment:  Last Vital Signs: BP (!) 147/78   Pulse 75   Temp 97.5 °F (36.4 °C) (Oral)   Resp 18   Ht 5' 4.96\" (1.65 m)   Wt 147 lb 4.3 oz (66.8 kg)   SpO2 98%   BMI 24.54 kg/m²     Last documented pain score (0-10 scale): Pain Level: 0  Last Weight:   Wt Readings from Last 1 Encounters:   05/03/19 147 lb 4.3 oz (66.8 kg)     Mental Status:  oriented to person only    IV Access:  - None    Nursing Mobility/ADLs:  Walking   Assisted with walker x 2  Transfer  Dependent  Bathing  Dependent  Dressing  Dependent  Toileting  Dependent  Feeding  Assisted  Med Admin  Assisted  Med Delivery   crushed and with applesauce    Wound Care Documentation and Therapy:  Wound 04/29/19 Thigh Anterior; Left (Active)   Wound Burn 5/5/2019  7:52 PM   Dressing Status Clean;Dry; Intact 5/7/2019  8:31 AM   Dressing/Treatment Open to air 5/7/2019  8:31 AM   Drainage Amount None 5/7/2019  8:31 AM   Odor None 5/6/2019  7:30 AM   Destiney-wound Assessment Open blisters 5/6/2019  7:05 AM   Number of days: 8        Elimination:  Continence:   · Bowel: No  · Bladder: No  Urinary Catheter: None   Colostomy/Ileostomy/Ileal Conduit: No       Date of Last BM: ***    Intake/Output Summary (Last 24 hours) at 5/7/2019 0931  Last data filed at 5/7/2019 0500  Gross per 24 hour   Intake 1500 ml   Output --   Net 1500 ml     I/O last 3 completed shifts: In: 1700 [P.O.:200;  I.V.:1500]  Out: -     Safety Concerns:     History of Falls (last 30 days) and At Risk for Falls    Impairments/Disabilities:    dementia     Nutrition Therapy:  Current Nutrition Therapy:   - Oral Diet:  General    Routes of Feeding: Oral  Liquids: No Restrictions  Daily Fluid Restriction: no  Last Modified Barium Swallow with Video (Video Swallowing Test): not done, not ordered    Treatments at the Time of Hospital Discharge:   Respiratory Treatments: none  Oxygen Therapy: is not on home oxygen therapy. Ventilator:  - No ventilator support    Rehab Therapies: Physical Therapy and Occupational Therapy  Weight Bearing Status/Restrictions: No weight bearing restirctions  Other Medical Equipment (for information only, NOT a DME order):  walker  Other Treatments: abductor pillow                                    Turn every 2 hours                                    Elevate heels off of bed    Patient's personal belongings (please select all that are sent with patient):  {[facility] DME Belongings:[id number]}    RN SIGNATURE:  Electronically signed by Devin Coyne on 5/2/19 at 11:12 AM    CASE MANAGEMENT/SOCIAL WORK SECTION    Inpatient Status Date: ***    Readmission Risk Assessment Score:  Readmission Risk              Risk of Unplanned Readmission:        25           Discharging to Facility/ Agency   · Name: Rebecca Amor  · Address:  · Phone:  · Fax:    Dialysis Facility (if applicable)   · Name:  · Address:  · Dialysis Schedule:  · Phone:  · Fax:    / signature: Electronically signed by Rohini Bonilla RN on 5/7/19 at 9:30 AM    PHYSICIAN SECTION    Prognosis: Good    Condition at Discharge: Stable    Rehab Potential (if transferring to Rehab): Good    Recommended Labs or Other Treatments After Discharge: Needs PT OT at [facility], repeat BMP and CBC  after 5 days    Physician Certification: I certify the above information and transfer of Olive Bernstein.  is necessary for the continuing treatment of the diagnosis listed and that he requires [facility] for less 30 days.      Update Admission H&P: No change in H&P    PHYSICIAN SIGNATURE:  Electronically signed by Camryn Scott MD on 5/5/19 at 2:19 PM

## 2019-05-02 NOTE — PROGRESS NOTES
Orthopedic Progress Note    Patient:  Yonas Aguayo. YOB: 1940     78 y.o. male    Subjective:  Patient seen and examined at bedside. Continued confusion this morning. No issues overnight. Denies pain at this time. Sitter at bedside  NPO since midnight. Objective:   Vitals:    05/01/19 2120   BP: 119/83   Pulse: 89   Resp:    Temp: 98.3 °F (36.8 °C)   SpO2: 97%     Gen: NAD, resting in bed comfortably in bed upon arrival     Cardiovascular: Regular rate, no dependent edema, distal pulses 2+    Respiratory: Chest symmetric, no accessory muscle use, normal respirations, no audible wheezes    LLE: Appropriate TTP over the hip. Superficial abrasion to lateral thigh. compartments soft and compressible. Moves toes spontaneously but not following commands. Endorses gross sensation to toes. Toes warm and well perfused with BCR.      Recent Labs     04/29/19  0618  05/01/19  0520  05/02/19  0033   WBC 4.4   < > 3.4*  --   --    HGB 8.4*   < > 8.3*  --   --    HCT 28.5*   < > 28.6*  --   --       < > 239  --   --    INR 0.9  --   --   --   --    *   < > 146*   < > 140   K 5.2   < > 5.0  --   --    BUN 53*   < > 33*  --   --    CREATININE 2.21*   < > 1.56*  --   --    GLUCOSE 78   < > 82  --   --     < > = values in this interval not displayed. Meds: See rec for complete list    Impression/plan: 78 y.o. male being seen for left femoral neck fracture following [address]     - NWB LLE  - Sodium remains normal today at 140. Hypernatremia resolved  - Cleared for surgery per IM  - IM/cadiology, nephrology on.   - low perioperative risk from cardiology. - Plan for OR today 5/2 for CRPP vs hemiarthroplasty of the left hip if ok from medical standpoint. Dr. Army Saleem to take over care of patient. - Abx OCTOR  - Pain control and medical management per primary   - NPO since MN  - Consent obtained and patient marked.   - Strict ice (20 min, 1 hour off) for edema/pain control  - Encourage incentive spirometry  - DVT ppx:  EPC. Please hold chemical AC for surgical pending intervention this AM.  - Vit D low 28.6.  Vitamin D script in chart.   - PT/OT to evaluate post op  - Please page ortho with any questions      --------------------------------------------------------------  Callie Martinez DO, PGY-2  [facility]) Orthopedic Surgery   5:23 AM 05/02/19

## 2019-05-02 NOTE — PROGRESS NOTES
Physical Therapy  DATE: 2019    NAME: Lj Lima. MRN: [id number]   : 1940    Patient not seen this date for Physical Therapy due to:  [] Blood transfusion in progress  [] Hemodialysis  []  Patient Declined  [] Spine Precautions   [] Strict Bedrest  [x] Surgery/ Procedure- OR at 10 per RN. PT will check back 5/3/19. [] Testing      [] Other        [] PT being discontinued at this time. Patient independent. No further needs. [] PT being discontinued at this time as the patient has been transferred to palliative care. No further needs.     Gabbi Morton, PT

## 2019-05-02 NOTE — PROGRESS NOTES
[facility]    Progress Note    5/2/2019    7:35 AM    Name:   Les Lema. MRN:     [id number]     Acct:      [de-identified]   Room:   [address] [place] [address]  IP Day:  4  Admit Date:  4/28/2019  9:26 PM    PCP:   Fabian Carrera MD  Code Status:  Full Code    Subjective:     C/C:   Chief Complaint   Patient presents with    Facial Laceration     L upper eyelid s/p fall when he was up and struck bedrail in room. Denies loc    Hip Pain     L hip. unknown if related to fall pta     Interval History Status:   Patient had left hip hemiarthroplasty today   No new issues overnight  Confusion status remains unchanged  Sodium has improved to 142   BUN/creatinine is trending down- 30/1. 53. Brief History:   Patient has significant dementia and is a very poor historian. Admitted through ER with following history;     Destiny Hadley Jr. is a 78 y. o. male who presents to the emergency department for evaluation of a fall.  Patient was ambulating, stumbled and fell onto a bed rail and then to the ground.  Sustained a laceration by his left eyebrow.  He did not lose consciousness or vomiting after the incident but he does suffer from dementia and seems to have a little bit of confusion.  Complains of left hip pain.  Patient denies any other acute complaints or injuries at this time.  This occurred just prior [facility].   He was found to have left hip fracture on imaging  Patient is seen by orthopedics who plans to do surgery for fractured left hip once patient is medically cleared  Talk to his nephew on the phone who confirmed that patient is staying in nursing home and has quit smoking and drinking many years back and has significant dementia             Review of Systems:   Limited information due to dementia:  Constitutional:  negative for chills, fevers, sweats, confused as before in answering  Respiratory:  negative for cough, dyspnea on exertion, shortness of breath, wheezing  Cardiovascular:  negative for chest pain, chest pressure/discomfort, lower extremity edema, palpitations  Gastrointestinal:  negative for abdominal pain, constipation, diarrhea, nausea, vomiting  Neurological:  negative for dizziness, headache    Medications: Allergies: Allergies   Allergen Reactions    Lisinopril     Peanut-Containing Drug Products        Current Meds:   Scheduled Meds:    ceFAZolin  2 g Intravenous On Call to OR    vitamin D  50,000 Units Oral Weekly    metoprolol tartrate  25 mg Oral BID    docusate sodium  100 mg Oral Nightly    mirtazapine  30 mg Oral Nightly    tamsulosin  0.4 mg Oral Daily    sodium chloride flush  10 mL Intravenous 2 times per day     Continuous Infusions:    dextrose      sodium chloride 75 mL/hr at 19 0943     PRN Meds: glucose, dextrose, glucagon (rDNA), dextrose, LORazepam, sodium chloride flush, potassium chloride **OR** potassium alternative oral replacement **OR** potassium chloride, magnesium sulfate, magnesium hydroxide, ondansetron, nicotine, acetaminophen, oxyCODONE-acetaminophen **OR** oxyCODONE-acetaminophen, morphine **OR** morphine    Data:     Past Medical History:   has a past medical history of Arthritis, Dementia, Hypertension, and Spinal stenosis. Social History:   reports that he has quit smoking. He has never used smokeless tobacco. He reports that he does not drink alcohol or use drugs. Family History: History reviewed. No pertinent family history. Vitals:  /83   Pulse 89   Temp 98.3 °F (36.8 °C) (Oral)   Resp 16   Wt 147 lb 3.2 oz (66.8 kg)   SpO2 97%   BMI 24.50 kg/m²   Temp (24hrs), Av.6 °F (36.4 °C), Min:97.3 °F (36.3 °C), Max:98.3 °F (36.8 °C)    No results for input(s): POCGLU in the last 72 hours. I/O (24Hr):     Intake/Output Summary (Last 24 hours) at 2019 0735  Last data filed at 2019 1844  Gross per 24 hour   Intake 1404 ml Output 600 ml   Net 804 ml       Labs:    Hematology:  Recent Labs     04/30/19  0745 05/01/19  0520 05/02/19  0558   WBC 3.9 3.4* 3.6   HGB 8.3* 8.3* 9.2*   HCT 29.6* 28.6* 30.1*    239 259     Chemistry:  Recent Labs     04/29/19  1607 04/30/19  0039 04/30/19  0609  05/01/19  0520  05/01/19  1855 05/02/19  0033 05/02/19  0558   *  --  151*   < > 146*   < > 142 140 142   K 5.2  --  5.0  --  5.0  --   --   --  5.2   *  --  124*  --  114*  --   --   --  109*   CO2 16*  --  17*  --  23  --   --   --  20   GLUCOSE 81  --  82  --  82  --   --   --  66*   BUN 50*  --  43*  --  33*  --   --   --  30*   CREATININE 1.85*  --  1.79*  --  1.56*  --   --   --  1.53*   ANIONGAP 14  --  10  --  9  --   --   --  13   LABGLOM 35*  --  37*  --  43*  --   --   --  44*   GFRAA 43*  --  45*  --  52*  --   --   --  53*   CALCIUM 8.9  --  8.7  --  8.3*  --   --   --  8.6   MYOGLOBIN 216* 173*  --   --   --   --   --   --   --     < > = values in this interval not displayed. No results for input(s): PROT, LABALBU, LABA1C, S7VFMJR, Q3SKMGY, FT4, TSH, AST, ALT, LDH, GGT, ALKPHOS, BILITOT, BILIDIR, AMMONIA, AMYLASE, LIPASE, LACTATE, CHOL, HDL, LDLCHOLESTEROL, CHOLHDLRATIO, TRIG, VLDL, PHENYTOIN, PHENYF in the last 72 hours. Lab Results   Component Value Date/Time    SPECIAL L AC 10 12/18/2018 07:47 PM     Lab Results   Component Value Date/Time    CULTURE NO GROWTH 6 DAYS 12/18/2018 07:47 PM       Lab Results   Component Value Date    FIO2 NOT REPORTED 12/18/2018       Radiology:    Ct Head Wo Contrast    Result Date: 4/28/2019  EXAMINATION: CT OF THE HEAD WITHOUT CONTRAST  4/28/2019 10:02 pm TECHNIQUE: CT of the head was performed without the administration of intravenous contrast. Dose modulation, iterative reconstruction, and/or weight based adjustment of the mA/kV was utilized to reduce the radiation dose to as low as reasonably achievable. COMPARISON: None. HISTORY: ORDERING SYSTEM PROVIDED HISTORY: fall. trauma TECHNOLOGIST PROVIDED HISTORY: Ordering Physician Provided Reason for Exam: fall Acuity: Acute Type of Exam: Initial FINDINGS: BRAIN/VENTRICLES: Generalized involutional changes of the brain from identified with prominence of ventricles sulci. There is no acute intracranial hemorrhage, mass effect or midline shift. No abnormal extra-axial fluid collection. The gray-white differentiation is maintained without evidence of an acute infarct. There is no evidence of hydrocephalus. Moderate periventricular subcortical white matter hypoattenuation suggestive chronic small vessel ischemic disease. Intracranial vascular calcifications. ORBITS: The visualized portion of the orbits demonstrate no acute abnormality. SINUSES: Mild ethmoid sinus mucosal thickening. Mucous retention cyst versus polyp identified involving the right sphenoid sinus. SOFT TISSUES/SKULL:  No acute abnormality of the visualized skull or soft tissues. No acute intracranial abnormality. Moderate ethmoid sinus mucosal thickening. Ct Femur Left Wo Contrast    Result Date: 4/29/2019  EXAMINATION: CT OF THE LEFT FEMUR WITHOUT CONTRAST 4/28/2019 10:51 pm TECHNIQUE: CT of the left femur was performed without the administration of intravenous contrast.  Multiplanar reformatted images are provided for review. Dose modulation, iterative reconstruction, and/or weight based adjustment of the mA/kV was utilized to reduce the radiation dose to as low as reasonably achievable. COMPARISON: Radiographs 4/28/2019 HISTORY ORDERING SYSTEM PROVIDED HISTORY: questionable proximal fracture on xray TECHNOLOGIST PROVIDED HISTORY: Ordering Physician Provided Reason for Exam: fall Acuity: Acute Type of Exam: Initial FINDINGS: Bones: Acute left basicervical femoral neck fracture with mild impaction of fracture fragments. Soft Tissue:  Vascular calcifications. Soft tissue injury surrounding the left hip. Joint:  Moderate right hip joint space narrowing. Left femoral neck fracture with impaction of fracture fragments. Xr Hip 2-3 Vw W Pelvis Left    Result Date: 4/28/2019  EXAMINATION: SINGLE XRAY VIEW OF THE PELVIS AND 2 XRAY VIEWS LEFT HIP 4/28/2019 9:38 pm COMPARISON: None. HISTORY: ORDERING SYSTEM PROVIDED HISTORY: trauma, pain TECHNOLOGIST PROVIDED HISTORY: trauma, pain Ordering Physician Provided Reason for Exam: fall Acuity: Acute Type of Exam: Initial FINDINGS: Questionable impacted fracture identified involving the left subcapital region. Right hip is intact. No dislocation. Mild-to-moderate changes both hips. Pelvis is intact. Questionable impaction fracture involving the left subcapital region. Please correlate with exam findings. Physical Examination:        General appearance:  alert, cooperative and no distress , answering little more appropriately today  Mental Status:  Confused as before due to dementia. Lungs:  clear to auscultation bilaterally, normal effort  Heart:  regular rate and rhythm, no murmur  Abdomen:  soft, nontender, nondistended, normal bowel sounds, no masses, hepatomegaly, splenomegaly  Extremities:  no edema, redness, tenderness in the calves  Skin:  Healing possible burn marks on left hip    Assessment:        Primary Problem  Fall with fracture left femur-status post left hip hemiarthroplasty 5/2/19  Hyper natremia-due to dehydration-resolved  AK I on CK D3-due to dehydration and ARB , slowly trending down  Active Hospital Problems    Diagnosis Date Noted    Cardiac arrhythmia [I49.9] 04/29/2019     Priority: High    Hypernatremia [E87.0] 04/29/2019     Priority: High    Closed fracture of neck of femur ([facility] [address] [S72.009A] 04/28/2019     Priority: High    MAYITO (acute kidney injury) ([address] [N17.9] 12/19/2018     Priority: High    Fall [W19. XXXA] 04/29/2019    Severe malnutrition ([address] [E43] 12/21/2018    Anemia due to stage 3 chronic kidney disease ([address] [N18.3, D63.1] 12/19/2018    Dementia [F03.90] 12/19/2018    Essential hypertension [I10] 12/19/2018    Stage 3 chronic kidney disease ([facility] [N18.3] 11/91/2670    Metabolic encephalopathy [W79.33] 12/18/2018       Plan:        1. Fluid has been changed to 0.9 normal saline  2. He had a echo before recently so new echo was canceled by cardiology  3. Continue postoperative management  4. Monitor electrolytes  5.  Start diet today when allowed     Joni Rey MD  5/2/2019  7:35 AM

## 2019-05-02 NOTE — CARE COORDINATION
Transitional Planning  Called Walsh Reilly pt's nephew at [phone] who is still out of town and will be back in town tomorrow. Previously Tegan Garcia stated he was not sure he wanted Stacie Lyon to go back to [facility]. He would like a new referral to be sent to [facility]. E-ferrral faxed to   His second choice is [facility]      13:10  Received phone call from Tawnya from [facility].   They will look over clinicals and start precert today

## 2019-05-03 LAB
ANION GAP SERPL CALCULATED.3IONS-SCNC: 11 MMOL/L (ref 9–17)
ANION GAP SERPL CALCULATED.3IONS-SCNC: 16 MMOL/L (ref 9–17)
BUN BLDV-MCNC: 34 MG/DL (ref 8–23)
BUN BLDV-MCNC: 36 MG/DL (ref 8–23)
BUN/CREAT BLD: ABNORMAL (ref 9–20)
BUN/CREAT BLD: ABNORMAL (ref 9–20)
CALCIUM SERPL-MCNC: 8.3 MG/DL (ref 8.6–10.4)
CALCIUM SERPL-MCNC: 8.3 MG/DL (ref 8.6–10.4)
CHLORIDE BLD-SCNC: 108 MMOL/L (ref 98–107)
CHLORIDE BLD-SCNC: 113 MMOL/L (ref 98–107)
CO2: 18 MMOL/L (ref 20–31)
CO2: 18 MMOL/L (ref 20–31)
CREAT SERPL-MCNC: 1.95 MG/DL (ref 0.7–1.2)
CREAT SERPL-MCNC: 2.1 MG/DL (ref 0.7–1.2)
EKG ATRIAL RATE: 88 BPM
EKG P AXIS: 18 DEGREES
EKG P-R INTERVAL: 146 MS
EKG Q-T INTERVAL: 386 MS
EKG QRS DURATION: 74 MS
EKG QTC CALCULATION (BAZETT): 467 MS
EKG R AXIS: 46 DEGREES
EKG T AXIS: 56 DEGREES
EKG VENTRICULAR RATE: 88 BPM
GFR AFRICAN AMERICAN: 37 ML/MIN
GFR AFRICAN AMERICAN: 40 ML/MIN
GFR NON-AFRICAN AMERICAN: 31 ML/MIN
GFR NON-AFRICAN AMERICAN: 33 ML/MIN
GFR SERPL CREATININE-BSD FRML MDRD: ABNORMAL ML/MIN/{1.73_M2}
GLUCOSE BLD-MCNC: 108 MG/DL (ref 75–110)
GLUCOSE BLD-MCNC: 39 MG/DL (ref 70–99)
GLUCOSE BLD-MCNC: 60 MG/DL (ref 75–110)
GLUCOSE BLD-MCNC: 71 MG/DL (ref 70–99)
HCT VFR BLD CALC: 28.8 % (ref 40.7–50.3)
HEMOGLOBIN: 8.8 G/DL (ref 13–17)
MCH RBC QN AUTO: 27.4 PG (ref 25.2–33.5)
MCHC RBC AUTO-ENTMCNC: 30.6 G/DL (ref 28.4–34.8)
MCV RBC AUTO: 89.7 FL (ref 82.6–102.9)
NRBC AUTOMATED: 0 PER 100 WBC
PDW BLD-RTO: 17.5 % (ref 11.8–14.4)
PLATELET # BLD: 268 K/UL (ref 138–453)
PMV BLD AUTO: 9 FL (ref 8.1–13.5)
POTASSIUM SERPL-SCNC: 5.5 MMOL/L (ref 3.7–5.3)
POTASSIUM SERPL-SCNC: 6.1 MMOL/L (ref 3.7–5.3)
RBC # BLD: 3.21 M/UL (ref 4.21–5.77)
SODIUM BLD-SCNC: 141 MMOL/L (ref 135–144)
SODIUM BLD-SCNC: 142 MMOL/L (ref 135–144)
SODIUM BLD-SCNC: 142 MMOL/L (ref 135–144)
WBC # BLD: 7.5 K/UL (ref 3.5–11.3)

## 2019-05-03 PROCEDURE — 6360000002 HC RX W HCPCS: Performed by: NURSE PRACTITIONER

## 2019-05-03 PROCEDURE — 6370000000 HC RX 637 (ALT 250 FOR IP): Performed by: INTERNAL MEDICINE

## 2019-05-03 PROCEDURE — 80048 BASIC METABOLIC PNL TOTAL CA: CPT

## 2019-05-03 PROCEDURE — 82947 ASSAY GLUCOSE BLOOD QUANT: CPT

## 2019-05-03 PROCEDURE — 36415 COLL VENOUS BLD VENIPUNCTURE: CPT

## 2019-05-03 PROCEDURE — 99232 SBSQ HOSP IP/OBS MODERATE 35: CPT | Performed by: INTERNAL MEDICINE

## 2019-05-03 PROCEDURE — 6360000002 HC RX W HCPCS: Performed by: STUDENT IN AN ORGANIZED HEALTH CARE EDUCATION/TRAINING PROGRAM

## 2019-05-03 PROCEDURE — 93005 ELECTROCARDIOGRAM TRACING: CPT

## 2019-05-03 PROCEDURE — 6370000000 HC RX 637 (ALT 250 FOR IP): Performed by: STUDENT IN AN ORGANIZED HEALTH CARE EDUCATION/TRAINING PROGRAM

## 2019-05-03 PROCEDURE — 1200000000 HC SEMI PRIVATE

## 2019-05-03 PROCEDURE — 51798 US URINE CAPACITY MEASURE: CPT

## 2019-05-03 PROCEDURE — 2580000003 HC RX 258: Performed by: INTERNAL MEDICINE

## 2019-05-03 PROCEDURE — 85027 COMPLETE CBC AUTOMATED: CPT

## 2019-05-03 PROCEDURE — 6370000000 HC RX 637 (ALT 250 FOR IP): Performed by: NURSE PRACTITIONER

## 2019-05-03 PROCEDURE — 2580000003 HC RX 258: Performed by: STUDENT IN AN ORGANIZED HEALTH CARE EDUCATION/TRAINING PROGRAM

## 2019-05-03 RX ORDER — SODIUM POLYSTYRENE SULFONATE 15 G/60ML
15 SUSPENSION ORAL; RECTAL ONCE
Status: COMPLETED | OUTPATIENT
Start: 2019-05-03 | End: 2019-05-03

## 2019-05-03 RX ORDER — DEXTROSE MONOHYDRATE 25 G/50ML
25 INJECTION, SOLUTION INTRAVENOUS ONCE
Status: COMPLETED | OUTPATIENT
Start: 2019-05-03 | End: 2019-05-03

## 2019-05-03 RX ORDER — ASPIRIN 81 MG/1
81 TABLET, CHEWABLE ORAL DAILY
Status: DISCONTINUED | OUTPATIENT
Start: 2019-05-03 | End: 2019-05-08 | Stop reason: HOSPADM

## 2019-05-03 RX ADMIN — MIRTAZAPINE 30 MG: 30 TABLET, FILM COATED ORAL at 19:56

## 2019-05-03 RX ADMIN — METOPROLOL TARTRATE 25 MG: 25 TABLET ORAL at 19:56

## 2019-05-03 RX ADMIN — OXYCODONE HYDROCHLORIDE AND ACETAMINOPHEN 1 TABLET: 5; 325 TABLET ORAL at 03:49

## 2019-05-03 RX ADMIN — TAMSULOSIN HYDROCHLORIDE 0.4 MG: 0.4 CAPSULE ORAL at 09:20

## 2019-05-03 RX ADMIN — ENOXAPARIN SODIUM 30 MG: 30 INJECTION SUBCUTANEOUS at 23:44

## 2019-05-03 RX ADMIN — SODIUM CHLORIDE: 9 INJECTION, SOLUTION INTRAVENOUS at 01:35

## 2019-05-03 RX ADMIN — OXYCODONE HYDROCHLORIDE AND ACETAMINOPHEN 1 TABLET: 5; 325 TABLET ORAL at 17:33

## 2019-05-03 RX ADMIN — ASPIRIN 81 MG: 81 TABLET, CHEWABLE ORAL at 23:42

## 2019-05-03 RX ADMIN — SODIUM POLYSTYRENE SULFONATE 15 G: 15 SUSPENSION ORAL; RECTAL at 18:39

## 2019-05-03 RX ADMIN — DEXTROSE MONOHYDRATE 25 G: 500 INJECTION PARENTERAL at 09:06

## 2019-05-03 RX ADMIN — Medication 2 G: at 01:35

## 2019-05-03 RX ADMIN — Medication 10 ML: at 09:21

## 2019-05-03 RX ADMIN — DOCUSATE SODIUM 100 MG: 100 CAPSULE, LIQUID FILLED ORAL at 19:56

## 2019-05-03 RX ADMIN — MAGNESIUM HYDROXIDE 30 ML: 400 SUSPENSION ORAL at 09:39

## 2019-05-03 RX ADMIN — INSULIN HUMAN 10 UNITS: 100 INJECTION, SOLUTION PARENTERAL at 09:06

## 2019-05-03 RX ADMIN — DEXTROSE MONOHYDRATE 12.5 G: 500 INJECTION PARENTERAL at 13:41

## 2019-05-03 RX ADMIN — METOPROLOL TARTRATE 25 MG: 25 TABLET ORAL at 09:20

## 2019-05-03 RX ADMIN — SODIUM CHLORIDE: 9 INJECTION, SOLUTION INTRAVENOUS at 15:08

## 2019-05-03 ASSESSMENT — PAIN SCALES - GENERAL
PAINLEVEL_OUTOF10: 6
PAINLEVEL_OUTOF10: 0
PAINLEVEL_OUTOF10: 5

## 2019-05-03 NOTE — PROGRESS NOTES
Orthopedic Progress Note    Patient:  Odalis Santana. YOB: 1940     78 y.o. male    Subjective:  Patient seen and examined  Difficult to arouse  Attempted movement of the hip and knee elicits a large amount of pain  Patient unable to answer questions    Vitals reviewed, afebrile    Objective:   Vitals:    05/03/19 0130   BP: (!) 151/76   Pulse: 68   Resp: 16   Temp: 96.8 °F (36 °C)   SpO2:      Gen: NAD, does not respond    LLE: hip and knee flexed to 90 degrees, optifoam dressing in place, c/d/i w/o strikethrough bleeding or discharge. Compartments soft and compressible. Difficult time assessing neurovascular status given patient condition, stimulation of the foot elicitis some motor response in the toes. Foot warm and well perfused. Recent Labs     05/02/19  0558  05/02/19  2338   WBC 3.6  --   --    HGB 9.2*  --   --    HCT 30.1*  --   --      --   --       < > 141   K 5.2  --   --    BUN 30*  --   --    CREATININE 1.53*  --   --    GLUCOSE 66*  --   --     < > = values in this interval not displayed. Meds:   See rec for complete list    Impression/plan: 78 y.o. male s/p L hip hemiarthroplasty, POD #1    -WBAT LLE  -Completed post-op ancef  -IM on as primary team, appreciate medical recommendations  -Pain control PO/IV Medication. Attempt to Wean IV medications.    -DVT ppx: EPC, ok for chemical AC from orthopedic standpoint, recommend chemical AC x 28 days post-op  -Cont to apply ice to the affected area for pain and swelling relief  -Encourage Incentive Spirometry use  -PT/OT mobilization  -Please page Ortho with any questions        Surya Valdez,    Orthopedic Surgery Resident PGY-2  [facility] Feliz, [facility]

## 2019-05-03 NOTE — PROGRESS NOTES
intracranial hemorrhage, mass effect or midline shift. No abnormal extra-axial fluid collection. The gray-white differentiation is maintained without evidence of an acute infarct. There is no evidence of hydrocephalus. Moderate periventricular subcortical white matter hypoattenuation suggestive chronic small vessel ischemic disease. Intracranial vascular calcifications. ORBITS: The visualized portion of the orbits demonstrate no acute abnormality. SINUSES: Mild ethmoid sinus mucosal thickening. Mucous retention cyst versus polyp identified involving the right sphenoid sinus. SOFT TISSUES/SKULL:  No acute abnormality of the visualized skull or soft tissues. No acute intracranial abnormality. Moderate ethmoid sinus mucosal thickening. Ct Femur Left Wo Contrast    Result Date: 4/29/2019  EXAMINATION: CT OF THE LEFT FEMUR WITHOUT CONTRAST 4/28/2019 10:51 pm TECHNIQUE: CT of the left femur was performed without the administration of intravenous contrast.  Multiplanar reformatted images are provided for review. Dose modulation, iterative reconstruction, and/or weight based adjustment of the mA/kV was utilized to reduce the radiation dose to as low as reasonably achievable. COMPARISON: Radiographs 4/28/2019 HISTORY ORDERING SYSTEM PROVIDED HISTORY: questionable proximal fracture on xray TECHNOLOGIST PROVIDED HISTORY: Ordering Physician Provided Reason for Exam: fall Acuity: Acute Type of Exam: Initial FINDINGS: Bones: Acute left basicervical femoral neck fracture with mild impaction of fracture fragments. Soft Tissue:  Vascular calcifications. Soft tissue injury surrounding the left hip. Joint:  Moderate right hip joint space narrowing. Left femoral neck fracture with impaction of fracture fragments. Xr Hip 2-3 Vw W Pelvis Left    Result Date: 4/28/2019  EXAMINATION: SINGLE XRAY VIEW OF THE PELVIS AND 2 XRAY VIEWS LEFT HIP 4/28/2019 9:38 pm COMPARISON: None.  HISTORY: ORDERING SYSTEM PROVIDED HISTORY: trauma, pain TECHNOLOGIST PROVIDED HISTORY: trauma, pain Ordering Physician Provided Reason for Exam: fall Acuity: Acute Type of Exam: Initial FINDINGS: Questionable impacted fracture identified involving the left subcapital region. Right hip is intact. No dislocation. Mild-to-moderate changes both hips. Pelvis is intact. Questionable impaction fracture involving the left subcapital region. Please correlate with exam findings. Physical Examination:        General appearance:  alert, cooperative and no distress , answering some questions appropriately today  Mental Status:  Confused as before due to dementia. Lungs:  clear to auscultation bilaterally, normal effort  Heart:  regular rate and some missing beats, no murmur  Abdomen:  soft, nontender, nondistended, normal bowel sounds, no masses, hepatomegaly, splenomegaly  Extremities:  no edema, redness, tenderness in the calves  Skin:  Healing possible burn marks on left hip    Assessment:        Primary Problem  Fall with fracture left femur-status post left hip hemiarthroplasty 5/2/19  Hyper natremia-due to dehydration-resolved  Hyperkalemia  AK I on CK D3-due to dehydration and ARB   Cardiac arrhythmia,   Active Hospital Problems    Diagnosis Date Noted    Cardiac arrhythmia [I49.9] 04/29/2019     Priority: High    Hypernatremia [E87.0] 04/29/2019     Priority: High    Closed displaced fracture of left femoral neck ([address] [S72.002A] 04/28/2019     Priority: High    MAYITO (acute kidney injury) ([address] [N17.9] 12/19/2018     Priority: High    Fall [W19. XXXA] 04/29/2019    Severe malnutrition ([address] [E43] 12/21/2018    Anemia due to stage 3 chronic kidney disease ([address] [N18.3, D63.1] 12/19/2018    Dementia [F03.90] 12/19/2018    Essential hypertension [I10] 12/19/2018    Stage 3 chronic kidney disease ([address] [N18.3] 07/25/9427    Metabolic encephalopathy [Q38.60] 12/18/2018       Plan:        1.  Fluid has been continued as 0.9 normal saline , 75

## 2019-05-03 NOTE — PLAN OF CARE
[facility]    Second Visit Note  For more detailed information please refer to the progress note of the day      5/3/2019    6:25 PM    Name:   Lizet Cleary. MRN:     [id number]     Acct:      [de-identified]   Room:   1966/9859-25   Day:  5  Admit Date:  4/28/2019  9:26 PM    PCP:   Mena Napier MD  Code Status:  Full Code        Pt vitals were reviewed   New labs were reviewed   Patient was seen    Updated plan :     1. Repeat potassium still 5.5, creatinine 2.10  2. Will give one dose of oral Kayexalate and recheck in morning  3. Increased IV fluids 200 ML per hour  4. Continue other treatment plan as before  5.  Patient had drop in blood pressure and 60s while in OR suggestive of a causing MAYITO due to [address], [place]  5/3/2019  6:25 PM

## 2019-05-03 NOTE — PROGRESS NOTES
Occupational Therapy Not Seen Note    DATE: 5/3/2019  Name: Efra Silva. : 1940  MRN: [id number]    Patient not available for Occupational Therapy due to:     Other: unable to arouse pt and pt not following any commands     Next Scheduled Treatment: check back 19    Electronically signed by GIOVANI Higuera on 5/3/2019 at 10:27 AM

## 2019-05-04 LAB
ANION GAP SERPL CALCULATED.3IONS-SCNC: 10 MMOL/L (ref 9–17)
ANION GAP SERPL CALCULATED.3IONS-SCNC: 12 MMOL/L (ref 9–17)
BUN BLDV-MCNC: 37 MG/DL (ref 8–23)
BUN BLDV-MCNC: 39 MG/DL (ref 8–23)
BUN/CREAT BLD: ABNORMAL (ref 9–20)
BUN/CREAT BLD: ABNORMAL (ref 9–20)
CALCIUM SERPL-MCNC: 8 MG/DL (ref 8.6–10.4)
CALCIUM SERPL-MCNC: 8.1 MG/DL (ref 8.6–10.4)
CHLORIDE BLD-SCNC: 112 MMOL/L (ref 98–107)
CHLORIDE BLD-SCNC: 114 MMOL/L (ref 98–107)
CO2: 19 MMOL/L (ref 20–31)
CO2: 20 MMOL/L (ref 20–31)
CREAT SERPL-MCNC: 1.9 MG/DL (ref 0.7–1.2)
CREAT SERPL-MCNC: 2.06 MG/DL (ref 0.7–1.2)
GFR AFRICAN AMERICAN: 38 ML/MIN
GFR AFRICAN AMERICAN: 42 ML/MIN
GFR NON-AFRICAN AMERICAN: 31 ML/MIN
GFR NON-AFRICAN AMERICAN: 34 ML/MIN
GFR SERPL CREATININE-BSD FRML MDRD: ABNORMAL ML/MIN/{1.73_M2}
GLUCOSE BLD-MCNC: 114 MG/DL (ref 75–110)
GLUCOSE BLD-MCNC: 157 MG/DL (ref 75–110)
GLUCOSE BLD-MCNC: 160 MG/DL (ref 75–110)
GLUCOSE BLD-MCNC: 40 MG/DL (ref 70–99)
GLUCOSE BLD-MCNC: 97 MG/DL (ref 70–99)
HCT VFR BLD CALC: 25.3 % (ref 40.7–50.3)
HCT VFR BLD CALC: 26.3 % (ref 40.7–50.3)
HCT VFR BLD CALC: 26.5 % (ref 40.7–50.3)
HEMOGLOBIN: 7.7 G/DL (ref 13–17)
HEMOGLOBIN: 7.8 G/DL (ref 13–17)
HEMOGLOBIN: 8 G/DL (ref 13–17)
MCH RBC QN AUTO: 26.7 PG (ref 25.2–33.5)
MCHC RBC AUTO-ENTMCNC: 30.4 G/DL (ref 28.4–34.8)
MCV RBC AUTO: 87.8 FL (ref 82.6–102.9)
NRBC AUTOMATED: 0 PER 100 WBC
PDW BLD-RTO: 17.9 % (ref 11.8–14.4)
PLATELET # BLD: 238 K/UL (ref 138–453)
PMV BLD AUTO: 9.3 FL (ref 8.1–13.5)
POTASSIUM SERPL-SCNC: 5.3 MMOL/L (ref 3.7–5.3)
POTASSIUM SERPL-SCNC: 5.4 MMOL/L (ref 3.7–5.3)
RBC # BLD: 2.88 M/UL (ref 4.21–5.77)
SODIUM BLD-SCNC: 142 MMOL/L (ref 135–144)
SODIUM BLD-SCNC: 145 MMOL/L (ref 135–144)
WBC # BLD: 6.7 K/UL (ref 3.5–11.3)

## 2019-05-04 PROCEDURE — 36415 COLL VENOUS BLD VENIPUNCTURE: CPT

## 2019-05-04 PROCEDURE — 1200000000 HC SEMI PRIVATE

## 2019-05-04 PROCEDURE — 80048 BASIC METABOLIC PNL TOTAL CA: CPT

## 2019-05-04 PROCEDURE — 97530 THERAPEUTIC ACTIVITIES: CPT

## 2019-05-04 PROCEDURE — 6360000002 HC RX W HCPCS: Performed by: STUDENT IN AN ORGANIZED HEALTH CARE EDUCATION/TRAINING PROGRAM

## 2019-05-04 PROCEDURE — 97162 PT EVAL MOD COMPLEX 30 MIN: CPT

## 2019-05-04 PROCEDURE — 6370000000 HC RX 637 (ALT 250 FOR IP): Performed by: NURSE PRACTITIONER

## 2019-05-04 PROCEDURE — 97167 OT EVAL HIGH COMPLEX 60 MIN: CPT

## 2019-05-04 PROCEDURE — 51798 US URINE CAPACITY MEASURE: CPT

## 2019-05-04 PROCEDURE — 85027 COMPLETE CBC AUTOMATED: CPT

## 2019-05-04 PROCEDURE — 85014 HEMATOCRIT: CPT

## 2019-05-04 PROCEDURE — 6370000000 HC RX 637 (ALT 250 FOR IP): Performed by: STUDENT IN AN ORGANIZED HEALTH CARE EDUCATION/TRAINING PROGRAM

## 2019-05-04 PROCEDURE — 85018 HEMOGLOBIN: CPT

## 2019-05-04 PROCEDURE — 99232 SBSQ HOSP IP/OBS MODERATE 35: CPT | Performed by: INTERNAL MEDICINE

## 2019-05-04 PROCEDURE — 82947 ASSAY GLUCOSE BLOOD QUANT: CPT

## 2019-05-04 PROCEDURE — 2580000003 HC RX 258: Performed by: INTERNAL MEDICINE

## 2019-05-04 PROCEDURE — 6370000000 HC RX 637 (ALT 250 FOR IP): Performed by: INTERNAL MEDICINE

## 2019-05-04 PROCEDURE — 6360000002 HC RX W HCPCS: Performed by: INTERNAL MEDICINE

## 2019-05-04 RX ORDER — HEPARIN SODIUM 5000 [USP'U]/ML
5000 INJECTION, SOLUTION INTRAVENOUS; SUBCUTANEOUS EVERY 8 HOURS SCHEDULED
Status: DISCONTINUED | OUTPATIENT
Start: 2019-05-04 | End: 2019-05-08 | Stop reason: HOSPADM

## 2019-05-04 RX ORDER — DEXTROSE MONOHYDRATE 25 G/50ML
25 INJECTION, SOLUTION INTRAVENOUS ONCE
Status: COMPLETED | OUTPATIENT
Start: 2019-05-04 | End: 2019-05-04

## 2019-05-04 RX ORDER — FLUDROCORTISONE ACETATE 0.1 MG/1
0.1 TABLET ORAL ONCE
Status: COMPLETED | OUTPATIENT
Start: 2019-05-04 | End: 2019-05-04

## 2019-05-04 RX ORDER — DEXTROSE AND SODIUM CHLORIDE 5; .9 G/100ML; G/100ML
INJECTION, SOLUTION INTRAVENOUS CONTINUOUS
Status: DISCONTINUED | OUTPATIENT
Start: 2019-05-04 | End: 2019-05-07

## 2019-05-04 RX ADMIN — HEPARIN SODIUM 5000 UNITS: 5000 INJECTION INTRAVENOUS; SUBCUTANEOUS at 13:55

## 2019-05-04 RX ADMIN — DEXTROSE MONOHYDRATE 25 G: 500 INJECTION PARENTERAL at 10:30

## 2019-05-04 RX ADMIN — SODIUM CHLORIDE: 9 INJECTION, SOLUTION INTRAVENOUS at 01:49

## 2019-05-04 RX ADMIN — METOPROLOL TARTRATE 25 MG: 25 TABLET ORAL at 08:47

## 2019-05-04 RX ADMIN — DEXTROSE AND SODIUM CHLORIDE: 5; 900 INJECTION, SOLUTION INTRAVENOUS at 08:20

## 2019-05-04 RX ADMIN — ASPIRIN 81 MG: 81 TABLET, CHEWABLE ORAL at 08:47

## 2019-05-04 RX ADMIN — MORPHINE SULFATE 2 MG: 2 INJECTION, SOLUTION INTRAMUSCULAR; INTRAVENOUS at 03:20

## 2019-05-04 RX ADMIN — INSULIN HUMAN 10 UNITS: 100 INJECTION, SOLUTION PARENTERAL at 10:30

## 2019-05-04 RX ADMIN — HEPARIN SODIUM 5000 UNITS: 5000 INJECTION INTRAVENOUS; SUBCUTANEOUS at 08:47

## 2019-05-04 RX ADMIN — FLUDROCORTISONE ACETATE 0.1 MG: 0.1 TABLET ORAL at 08:46

## 2019-05-04 RX ADMIN — MIRTAZAPINE 30 MG: 30 TABLET, FILM COATED ORAL at 20:21

## 2019-05-04 RX ADMIN — HEPARIN SODIUM 5000 UNITS: 5000 INJECTION INTRAVENOUS; SUBCUTANEOUS at 21:55

## 2019-05-04 RX ADMIN — TAMSULOSIN HYDROCHLORIDE 0.4 MG: 0.4 CAPSULE ORAL at 08:47

## 2019-05-04 RX ADMIN — METOPROLOL TARTRATE 25 MG: 25 TABLET ORAL at 20:20

## 2019-05-04 ASSESSMENT — PAIN SCALES - GENERAL
PAINLEVEL_OUTOF10: 2
PAINLEVEL_OUTOF10: 5

## 2019-05-04 NOTE — PROGRESS NOTES
[place] [facility] NP Lenrad Thomas- per phone conversation with ortho- have medicine restart on anticoagulation. Writer will continue to monitor.

## 2019-05-04 NOTE — PROGRESS NOTES
[facility]    Progress Note    5/4/2019    7:17 AM    Name:   Todd Martinez. MRN:     [id number]     Acct:      [de-identified]   Room:   [address] [place] [address]  IP Day:  6  Admit Date:  4/28/2019  9:26 PM    PCP:   Javid Norman MD  Code Status:  Full Code    Subjective:     C/C:   Chief Complaint   Patient presents with    Facial Laceration     L upper eyelid s/p fall when he was up and struck bedrail in room. Denies loc    Hip Pain     L hip. unknown if related to fall pta     Interval History Status:   Patient had left hip hemiarthroplasty 5/2/19   Patient had drop in blood pressure and 60s while in OR suggestive of a causing MAYITO due to ATN  Potassium was high yesterday requiring insulin/dextrose/Kayexalate  Today potassium still 5.4, BUN 39, creatinine 2.06  Hemoglobin 7.7  Blood glucose 71-39-97  Patient denies any new symptoms        Brief History:   Patient has significant dementia and is a very poor historian. Admitted through ER with following history;     Damon Coko Jr. is a 78 y. o. male who presents to the emergency department for evaluation of a fall.  Patient was ambulating, stumbled and fell onto a bed rail and then to the ground.  Sustained a laceration by his left eyebrow.  He did not lose consciousness or vomiting after the incident but he does suffer from dementia and seems to have a little bit of confusion.  Complains of left hip pain.  Patient denies any other acute complaints or injuries at this time.  This occurred just prior to arrival at [facility].   He was found to have left hip fracture on imaging  Patient is seen by orthopedics who plans to do surgery for fractured left hip once patient is medically cleared  Talk to his nephew on the phone who confirmed that patient is staying in nursing home and has quit smoking and drinking many years back and has significant dementia             Review of Systems: Limited information due to dementia:  Constitutional:  negative for chills, fevers, sweats, confused as before in answering  Respiratory:  negative for cough, dyspnea on exertion, shortness of breath, wheezing  Cardiovascular:  negative for chest pain, chest pressure/discomfort, lower extremity edema, palpitations  Gastrointestinal:  negative for abdominal pain, constipation, diarrhea, nausea, vomiting  Neurological:  negative for dizziness, headache    Medications: Allergies: Allergies   Allergen Reactions    Lisinopril     Peanut-Containing Drug Products        Current Meds:   Scheduled Meds:    aspirin  81 mg Oral Daily    enoxaparin  30 mg Subcutaneous Daily    vitamin D  50,000 Units Oral Weekly    metoprolol tartrate  25 mg Oral BID    docusate sodium  100 mg Oral Nightly    mirtazapine  30 mg Oral Nightly    tamsulosin  0.4 mg Oral Daily    sodium chloride flush  10 mL Intravenous 2 times per day     Continuous Infusions:    dextrose      sodium chloride 100 mL/hr at 19 0149     PRN Meds: glucose, dextrose, glucagon (rDNA), dextrose, LORazepam, sodium chloride flush, potassium chloride **OR** potassium alternative oral replacement **OR** potassium chloride, magnesium sulfate, magnesium hydroxide, ondansetron, nicotine, acetaminophen, oxyCODONE-acetaminophen **OR** oxyCODONE-acetaminophen, morphine **OR** morphine    Data:     Past Medical History:   has a past medical history of Arthritis, Dementia, Hypertension, and Spinal stenosis. Social History:   reports that he has quit smoking. He has never used smokeless tobacco. He reports that he does not drink alcohol or use drugs. Family History: History reviewed. No pertinent family history.     Vitals:  BP (!) 124/54   Pulse 75   Temp 97.8 °F (36.6 °C) (Axillary)   Resp 16   Ht 5' 4.96\" (1.65 m)   Wt 147 lb 4.3 oz (66.8 kg)   SpO2 98%   BMI 24.54 kg/m²   Temp (24hrs), Av.8 °F (36.6 °C), Min:97.1 °F (36.2 °C), Max:98.3 °F (36.8 °C)    Recent Labs     05/03/19  1333 05/03/19  1356   POCGLU 60* 108       I/O (24Hr): Intake/Output Summary (Last 24 hours) at 5/4/2019 0717  Last data filed at 5/4/2019 0440  Gross per 24 hour   Intake 2889 ml   Output --   Net 2889 ml       Labs:    Hematology:  Recent Labs     05/02/19  0558 05/03/19  0623 05/04/19  0539   WBC 3.6 7.5 6.7   HGB 9.2* 8.8* 7.7*   HCT 30.1* 28.8* 25.3*    268 238     Chemistry:  Recent Labs     05/03/19  0623 05/03/19  1211 05/04/19  0539    142 142   K 6.1* 5.5* 5.4*   * 113* 112*   CO2 18* 18* 20   GLUCOSE 71 39* 97   BUN 34* 36* 39*   CREATININE 1.95* 2.10* 2.06*   ANIONGAP 16 11 10   LABGLOM 33* 31* 31*   GFRAA 40* 37* 38*   CALCIUM 8.3* 8.3* 8.0*     No results for input(s): PROT, LABALBU, LABA1C, J4GGTML, G5OFLQN, FT4, TSH, AST, ALT, LDH, GGT, ALKPHOS, BILITOT, BILIDIR, AMMONIA, AMYLASE, LIPASE, LACTATE, CHOL, HDL, LDLCHOLESTEROL, CHOLHDLRATIO, TRIG, VLDL, PHENYTOIN, PHENYF in the last 72 hours. Lab Results   Component Value Date/Time    SPECIAL L AC 10 12/18/2018 07:47 PM     Lab Results   Component Value Date/Time    CULTURE NO GROWTH 6 DAYS 12/18/2018 07:47 PM       Lab Results   Component Value Date    FIO2 NOT REPORTED 12/18/2018       Radiology:    Ct Head Wo Contrast    Result Date: 4/28/2019  EXAMINATION: CT OF THE HEAD WITHOUT CONTRAST  4/28/2019 10:02 pm TECHNIQUE: CT of the head was performed without the administration of intravenous contrast. Dose modulation, iterative reconstruction, and/or weight based adjustment of the mA/kV was utilized to reduce the radiation dose to as low as reasonably achievable. COMPARISON: None. HISTORY: ORDERING SYSTEM PROVIDED HISTORY: fall. trauma TECHNOLOGIST PROVIDED HISTORY: Ordering Physician Provided Reason for Exam: fall Acuity: Acute Type of Exam: Initial FINDINGS: BRAIN/VENTRICLES: Generalized involutional changes of the brain from identified with prominence of ventricles sulci.   There is no acute intracranial hemorrhage, mass effect or midline shift. No abnormal extra-axial fluid collection. The gray-white differentiation is maintained without evidence of an acute infarct. There is no evidence of hydrocephalus. Moderate periventricular subcortical white matter hypoattenuation suggestive chronic small vessel ischemic disease. Intracranial vascular calcifications. ORBITS: The visualized portion of the orbits demonstrate no acute abnormality. SINUSES: Mild ethmoid sinus mucosal thickening. Mucous retention cyst versus polyp identified involving the right sphenoid sinus. SOFT TISSUES/SKULL:  No acute abnormality of the visualized skull or soft tissues. No acute intracranial abnormality. Moderate ethmoid sinus mucosal thickening. Ct Femur Left Wo Contrast    Result Date: 4/29/2019  EXAMINATION: CT OF THE LEFT FEMUR WITHOUT CONTRAST 4/28/2019 10:51 pm TECHNIQUE: CT of the left femur was performed without the administration of intravenous contrast.  Multiplanar reformatted images are provided for review. Dose modulation, iterative reconstruction, and/or weight based adjustment of the mA/kV was utilized to reduce the radiation dose to as low as reasonably achievable. COMPARISON: Radiographs 4/28/2019 HISTORY ORDERING SYSTEM PROVIDED HISTORY: questionable proximal fracture on xray TECHNOLOGIST PROVIDED HISTORY: Ordering Physician Provided Reason for Exam: fall Acuity: Acute Type of Exam: Initial FINDINGS: Bones: Acute left basicervical femoral neck fracture with mild impaction of fracture fragments. Soft Tissue:  Vascular calcifications. Soft tissue injury surrounding the left hip. Joint:  Moderate right hip joint space narrowing. Left femoral neck fracture with impaction of fracture fragments. Xr Hip 2-3 Vw W Pelvis Left    Result Date: 4/28/2019  EXAMINATION: SINGLE XRAY VIEW OF THE PELVIS AND 2 XRAY VIEWS LEFT HIP 4/28/2019 9:38 pm COMPARISON: None.  HISTORY: ORDERING SYSTEM PROVIDED HISTORY: trauma, pain TECHNOLOGIST PROVIDED HISTORY: trauma, pain Ordering Physician Provided Reason for Exam: fall Acuity: Acute Type of Exam: Initial FINDINGS: Questionable impacted fracture identified involving the left subcapital region. Right hip is intact. No dislocation. Mild-to-moderate changes both hips. Pelvis is intact. Questionable impaction fracture involving the left subcapital region. Please correlate with exam findings. Physical Examination:        General appearance:  alert, cooperative and no distress , answering some questions appropriately today  Mental Status:  Confused as before due to dementia. Lungs:  clear to auscultation bilaterally, normal effort  Heart:  regular rate and some missing beats, no murmur  Abdomen:  soft, nontender, nondistended, normal bowel sounds, no masses, hepatomegaly, splenomegaly  Extremities:  no edema, redness, tenderness in the calves  Skin:  Healing possible burn marks on left hip    Assessment:        Primary Problem  Fall with fracture left femur-status post left hip hemiarthroplasty 5/2/19  Hyper natremia-due to dehydration-resolved  Hyperkalemia  AK I on CK D3-due to blood pressure drop in the OR causing ATN   Cardiac arrhythmia-stable today,   Active Hospital Problems    Diagnosis Date Noted    Cardiac arrhythmia [I49.9] 04/29/2019     Priority: High    Hypernatremia [E87.0] 04/29/2019     Priority: High    Closed displaced fracture of left femoral neck ([address] [S72.002A] 04/28/2019     Priority: High    MAYITO (acute kidney injury) ([address] [N17.9] 12/19/2018     Priority: High    Fall [W19. 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  Fluid has

## 2019-05-04 NOTE — PROGRESS NOTES
Physical Therapy    Facility/Department: [address] ORTHO/MED SURG  Initial Assessment    NAME: Naoma Schaumann. : 1940  MRN: [id number]    Chief Complaint   Patient presents with    Facial Laceration     L upper eyelid s/p fall when he was up and struck bedrail in room. Denies loc    Hip Pain     L hip. unknown if related to fall pta       Date of Service: 2019    Discharge Recommendations:    Further therapy recommended at discharge. Assessment   Body structures, Functions, Activity limitations: Decreased functional mobility ; Decreased strength;Decreased balance;Decreased endurance;Decreased safe awareness  Assessment: Pt grossly modA to maxA for mobility, with assist of 2 appropriate for safety. Prognosis: Good  Decision Making: Medium Complexity  Patient Education: PT POC  REQUIRES PT FOLLOW UP: Yes  Activity Tolerance  Activity Tolerance: Patient Tolerated treatment well;Patient limited by fatigue;Patient limited by endurance; Patient limited by pain       Patient Diagnosis(es): The primary encounter diagnosis was Closed displaced fracture of left femoral neck ([facility] Diagnoses of Stage 3 chronic kidney disease ([address] Dementia associated with other underlying disease without behavioral disturbance, and Facial laceration, initial encounter were also pertinent to this visit. has a past medical history of Arthritis, Dementia, Hypertension, and Spinal stenosis. has a past surgical history that includes Rotator cuff repair (Left); Appendectomy; Colonoscopy; Endoscopy, colon, diagnostic; eye surgery; Partial hip arthroplasty (Left, 2019); and hip pinning (Left, 2019).     Restrictions  Restrictions/Precautions  Restrictions/Precautions: Fall Risk, Weight Bearing, General Precautions  Required Braces or Orthoses?: No(abduction pillow)  Lower Extremity Weight Bearing Restrictions  Left Lower Extremity Weight Bearing: Weight Bearing As Tolerated(FWB)  Position Activity Restriction  Other position/activity restrictions: s/p L johny hip 5/2, up with assist   Vision/Hearing  Vision: Within Functional Limits  Hearing: Within functional limits     Subjective  General  Chart Reviewed: Yes  Patient assessed for rehabilitation services?: Yes  Response To Previous Treatment: Not applicable  Family / Caregiver Present: No  Follows Commands: Within Functional Limits  General Comment  Comments: OT co-eval  Subjective  Subjective: RN and pt agreeable to PT. Pt alert in bed upon arrival.   Pain Screening  Patient Currently in Pain: Denies(Pt denied, uncertain reliability)  Vital Signs  Patient Currently in Pain: Denies(Pt denied, uncertain reliability)  Pre Treatment Pain Screening  Intervention List: Patient able to continue with treatment    Orientation  Orientation  Overall Orientation Status: (PT not responding to questions concerns orientation, or mumbling answers/ incomprehensible. )  Social/Functional History  Social/Functional History  Lives With: Alone  Type of Home: House  Home Layout: One level  Home Equipment: Rolling walker  Receives Help From: Family, Home health(pt has private pay HHA and RN )  Homemaking Responsibilities: No  Ambulation Assistance: Independent(with RW)  Transfer Assistance: Independent  Active : No  Additional Comments: Above information obtained from chart due to pt inability to answer any social questions. Per chart, pt has HHA and RN during the day and pt nephew stays with pt at night. Pt was using a RW prior to fall at home. Unsure of function with ADLs   Cognition   Cognition  Overall Cognitive Status: Exceptions  Following Commands: Follows one step commands with increased time; Follows one step commands with repetition  Attention Span: Attends with cues to redirect; Difficulty attending to directions  Memory: Decreased recall of recent events;Decreased recall of biographical Information  Problem Solving: Assistance required to generate solutions  Initiation: Requires cues for all  Sequencing: Requires cues for all    Objective          AROM RLE (degrees)  RLE AROM: WFL  AROM LLE (degrees)  LLE AROM : WFL  AROM RUE (degrees)  RUE AROM : WFL  AROM LUE (degrees)  LUE AROM : WFL  Strength RLE  Strength RLE: WFL  Comment: 4-  Strength LLE  Strength LLE: WFL  Comment: 4-  Strength RUE  Strength RUE: WFL  Comment: antigravity  Strength LUE  Strength LUE: WFL  Comment: antigravity  Strength Other  Other: grossly deconditioned     Sensation  Overall Sensation Status: (Pt not responding to questions on sensation, NO)  Bed mobility  Supine to Sit: 2 Person assistance; Moderate assistance  Sit to Supine: (left in chair)  Scooting: Moderate assistance  Transfers  Sit to Stand: Maximum Assistance  Stand to sit: Moderate Assistance  Bed to Chair: Maximum assistance  Stand Pivot Transfers: Maximum Assistance(Pt failing to extend hips/ knees)  Ambulation  Ambulation?: No  More Ambulation?: No     Balance  Sitting - Static: Good;-  Sitting - Dynamic: Fair;+  Standing - Static: Poor  Standing - Dynamic: Poor  Comments: face to face stand pivot to chair, assistance required to keep inside leg forward for transfer d/t pt'd limited responce to cueing.         Plan   Plan  Times per week: 5-6x/wk  Current Treatment Recommendations: Strengthening, Balance Training, Functional Mobility Training, Transfer Training, Endurance Training, Safety Education & Training, Home Exercise Program, Patient/Caregiver Education & Training, Equipment Evaluation, Education, & procurement  Safety Devices  Type of devices: Call light within reach, Nurse notified, Gait belt, Patient at risk for falls, Chair alarm in place, Left in chair, All fall risk precautions in place  Restraints  Initially in place: No    AM-PAC Score     AM-PAC Inpatient Mobility without Stair Climbing Raw Score : 10  AM-PAC Inpatient without Stair Climbing T-Scale Score : 34.07  Mobility Inpatient CMS 0-100% Score: 71.66  Mobility Inpatient without Peter CMS G-Code Modifier : CL       Goals  Short term goals  Time Frame for Short term goals: 14 visits  Short term goal 1: Pt will be Adela to EOB  Short term goal 2: Pt will be Adela to stand  Short term goal 3: Pt will stand 2min CGA with least restrictive AD  Short term goal 4: Pt will be safe to attempt ambulation       Therapy Time   Individual Concurrent Group Co-treatment   Time In 0847         Time Out 0905         Minutes [address] [facility], [id number], PT

## 2019-05-04 NOTE — PROGRESS NOTES
Progress Note    Patient:  Rosalinda Grove. YOB: 1940     78 y.o. male    Subjective:  Patient seen and examined at bedside this morning. No complaints or concerns per patient this morning. Elevated potassium this morning, IM aware. Pain well-controlled . Denies: fever/chills, HA, CP, SOB, N/V, dysuria, or numbness/tingling in extremities. PT: Did not ambulate with PT due ti inability to follow commands. Objective:   Vitals:    05/04/19 0326   BP: (!) 124/54   Pulse: 75   Resp: 16   Temp: 97.8 °F (36.6 °C)   SpO2: 98%     Gen: NAD, cooperative  MSK: L hi;p with optifoam in place, c/d/i. No surrounding erythema or discharge. Mild TTP around surgical site. Compartments soft. 2+ DP pulse. TA/EHL/FHL/GS motor intact. Deep and Superficial Peroneal/Saphenous/Sural/Plantar SILT. Recent Labs     05/04/19  0539   WBC 6.7   HGB 7.7*   HCT 25.3*         K 5.4*   BUN 39*   CREATININE 2.06*   GLUCOSE 97       Meds: Lovenox  See rec for complete list    Assessment/Plan : 78 y.o. male s/p L hip hemiarthroplasty, POD #2        -WBAT LLE  -IM on as primary team  -Pain control PO/IV Medication. Attempt to Wean IV medications. -DVT ppx: EPC, ok for chemical AC from orthopedic standpoint, recommend chemical AC x 28 days post-op  -Cont to apply ice to the affected area for pain and swelling relief  -Encourage Incentive Spirometry use  -PT/OT if patient able to tolerate.  -Ok to DC from orthopedic perspective.  Follow up with Dr. Davida Cushing in 10-14 days.  -Please page Ortho with any questions        --------------------------------------------------------------  Chris Stanton, DO, PGY-2  [facility]) Orthopedic Surgery   8:21 AM 05/04/19

## 2019-05-04 NOTE — PROGRESS NOTES
and hip pinning (Left, 5/2/2019). Treatment Diagnosis: fall       Restrictions  Restrictions/Precautions  Restrictions/Precautions: Fall Risk, Weight Bearing  Required Braces or Orthoses?: No  Lower Extremity Weight Bearing Restrictions  Left Lower Extremity Weight Bearing: Weight Bearing As Tolerated  Position Activity Restriction  Other position/activity restrictions: L johny hip, up with assist     Subjective   General  Chart Reviewed: No  Patient assessed for rehabilitation services?: Yes  Family / Caregiver Present: No  Diagnosis: fall  Subjective  Subjective: co-eval with PT  General Comment  Comments: rN ok'd for therapy this morning. Pt agreeable to participate in session and cooperative/pleasant throughout, although confused   Pain assessment: denies (pt said no when asked if in pain)    Oxygen Therapy  SpO2: 97 %  O2 Device: None (Room air)     Social/Functional History  Social/Functional History  Lives With: Alone  Type of Home: House  Home Layout: One level  Home Equipment: Rolling walker  Receives Help From: Family, Home health(pt has private pay HHA and RN )  Homemaking Responsibilities: No  Ambulation Assistance: Independent(with RW)  Transfer Assistance: Independent  Active : No  Additional Comments: Above information obtained from chart due to pt inability to answer any social questions. Per chart, pt has HHA and RN during the day and pt nephew stays with pt at night. Pt was using a RW prior to fall at home. Unsure of function with ADLs      Objective   Vision: Within Functional Limits  Hearing: Within functional limits    Orientation  Overall Orientation Status: Impaired(pt responded to name but unable to answer any orientation questions )  Orientation Level: Disoriented to time;Disoriented to place; Disoriented to situation;Oriented to person     Balance  Sitting Balance: Stand by assistance(~8 minutes on EOB and in chair )  Standing Balance: Maximum assistance  Standing Balance  Time: ~1 0-145:  WFL  L Wrist Flexion 0-80: WFL  L Wrist Extension 0-70: WFl    Left Hand AROM: WFL    RUE PROM: Exceptions  R Shoulder Flex  0-180: 0-90  R Elbow Flexion 0-145: WFL  R Wrist Flex 0-80: WFL  R Wrist Ext 0-70: WFL    RUE AROM : Exceptions  R Shoulder Flexion 0-180: 0-60 observed during functional movement   R Elbow Flexion 0-145: WFL  R Wrist Flexion 0-80: WFL  R Wrist Extension 0-70: WFL    Right Hand AROM: WFL    LUE Strength  Gross LUE Strength: Exceptions to [facility] [employer]  L Hand Grasp: 3-/5  LUE Strength Comment: formal MMT not completed due to pt inconsistently following commands   RUE Strength  Gross RUE Strength: Exceptions to [facility] [employer]  R Hand Grasp: 3-/5  RUE Strength Comment: formal MMT not completed due to pt inconsistently following commands       Plan   Plan  Times per week: 4-5x/wk    AM-PAC Score   AM-[facility] Inpatient Daily Activity Raw Score: 14  AM-PAC Inpatient ADL T-Scale Score : 33.39  ADL Inpatient CMS 0-100% Score: 59.67  ADL Inpatient CMS G-Code Modifier : CK    Goals  Short term goals  Time Frame for Short term goals: pt will, by discharge  Short term goal 1: maintain attention to task for ~8 minutes with min verbal cues for redirection  Short term goal 2: follow 80% of commands during session with min verbal cues  Short term goal 3: dem mod A during functional transfers/functional mobility with LRD  Short term goal 4: dem ~5 minutes static standing tolerance with LRD and mod A in order to increase ability to complete ADLs  Short term goal 5: complete LB ADLs with mod A ,set up and AE, as needed  Short term goal 6: complete UB ADLs and grooming tasks with min A, set up and min verbal cues for initiation      Therapy Time   Individual Concurrent Group Co-treatment   Time In 0847         Time Out 0903         Minutes 16               Wava Apley, OTR/CHARLA

## 2019-05-04 NOTE — PLAN OF CARE
Adams Mckinley 19    Second Visit Note  For more detailed information please refer to the progress note of the day      5/4/2019    5:46 PM    Name:   Gómez Almazan MRN:     [id number]     Acct:      [de-identified]   Room:   84/7248-68   Day:  6  Admit Date:  4/28/2019  9:26 PM    PCP:   Alireza Cortez MD  Code Status:  Full Code        Pt vitals were reviewed   New labs were reviewed   Patient was seen    Updated plan :     Condition remains stable, eating his food  1. Potassium is improved to 5.3  2. Creatinine trending down, 1.90  3. Glucose 157-160  4. Will start renal ensure as supplement  5.  Monitor labs in morning        Quintin Jean MD  5/4/2019  5:46 PM

## 2019-05-04 NOTE — PROGRESS NOTES
Perfect Served NP [place]-about elevated BUN and Creatinine levels and the ordered Lovenox. Lovenox dose was adjusted to 30 mg per NP Angie Patel order.

## 2019-05-04 NOTE — PROGRESS NOTES
lab called critical bs 40 just  now was drawn at 143pm  pt ate since then and bs  now 160 dr Lori mcgovern served above

## 2019-05-04 NOTE — PLAN OF CARE
Problem: Falls - Risk of:  Goal: Will remain free from falls  Description  Will remain free from falls  5/4/2019 1442 by Jocelin Cuenca RN  Outcome: Ongoing  5/4/2019 0529 by Renan Strange RN  Outcome: Ongoing  Goal: Absence of physical injury  Description  Absence of physical injury  5/4/2019 1442 by Jocelin Cuenca RN  Outcome: Ongoing  5/4/2019 0529 by Renan Strange RN  Outcome: Ongoing     Problem: Risk for Impaired Skin Integrity  Goal: Tissue integrity - skin and mucous membranes  Description  Structural intactness and normal physiological function of skin and  mucous membranes.   5/4/2019 1442 by Jocelin Cuenca RN  Outcome: Ongoing  5/4/2019 0529 by Renan Strange RN  Outcome: Ongoing     Problem: Confusion - Acute:  Goal: Absence of continued neurological deterioration signs and symptoms  Description  Absence of continued neurological deterioration signs and symptoms  5/4/2019 1442 by Jocelin Cuenca RN  Outcome: Ongoing  5/4/2019 0529 by Renan Strange RN  Outcome: Ongoing  Goal: Mental status will be restored to baseline  Description  Mental status will be restored to baseline  5/4/2019 1442 by Jocelin Cuenca RN  Outcome: Ongoing  5/4/2019 0529 by Renan Strange RN  Outcome: Ongoing     Problem: Discharge Planning:  Goal: Ability to perform activities of daily living will improve  Description  Ability to perform activities of daily living will improve  5/4/2019 1442 by Jocelin Cuenca RN  Outcome: Ongoing  5/4/2019 0529 by Renan Strange RN  Outcome: Ongoing  Goal: Participates in care planning  Description  Participates in care planning  5/4/2019 1442 by Jocelin Cuenca RN  Outcome: Ongoing  5/4/2019 0529 by Renan Strange RN  Outcome: Ongoing     Problem: Injury - Risk of, Physical Injury:  Goal: Will remain free from falls  Description  Will remain free from falls  5/4/2019 1442 by Jocelin Cuenca RN  Outcome: Ongoing  5/4/2019 0529 by Renan Strange RN  Outcome: Ongoing  Goal: Absence of physical injury  Description  Absence of physical injury  5/4/2019 1442 by Sabrina Daniel RN  Outcome: Ongoing  5/4/2019 0529 by Vannessa Rivera RN  Outcome: Ongoing     Problem: Mood - Altered:  Goal: Mood stable  Description  Mood stable  5/4/2019 1442 by Sabrina Daniel RN  Outcome: Ongoing  5/4/2019 0529 by Vannessa Rivera RN  Outcome: Ongoing  Goal: Absence of abusive behavior  Description  Absence of abusive behavior  5/4/2019 1442 by Sabrina Daniel RN  Outcome: Ongoing  5/4/2019 0529 by Vannessa Rivera RN  Outcome: Ongoing  Goal: Verbalizations of feeling emotionally comfortable while being cared for will increase  Description  Verbalizations of feeling emotionally comfortable while being cared for will increase  5/4/2019 1442 by Sabrina Daniel RN  Outcome: Ongoing  5/4/2019 0529 by Vannessa Rivera RN  Outcome: Ongoing     Problem: Psychomotor Activity - Altered:  Goal: Absence of psychomotor disturbance signs and symptoms  Description  Absence of psychomotor disturbance signs and symptoms  5/4/2019 1442 by Sabrina Daniel RN  Outcome: Ongoing  5/4/2019 0529 by Vannessa Rivera RN  Outcome: Ongoing     Problem: Sensory Perception - Impaired:  Goal: Demonstrations of improved sensory functioning will increase  Description  Demonstrations of improved sensory functioning will increase  5/4/2019 1442 by Sabrina Daniel RN  Outcome: Ongoing  5/4/2019 0529 by Vannessa Rivera RN  Outcome: Ongoing  Goal: Decrease in sensory misperception frequency  Description  Decrease in sensory misperception frequency  5/4/2019 1442 by Sabrina Daniel RN  Outcome: Ongoing  5/4/2019 0529 by Vannessa Rivera RN  Outcome: Ongoing  Goal: Able to refrain from responding to false sensory perceptions  Description  Able to refrain from responding to false sensory perceptions  5/4/2019 1442 by Sabrina Daniel RN  Outcome: Ongoing  5/4/2019 0529 by Vannessa Rivera RN  Outcome: Ongoing  Goal: Demonstrates accurate environmental perceptions  Description  Demonstrates accurate environmental perceptions  5/4/2019 0529 by Behzad Morgan RN  Outcome: Ongoing  Goal: Able to distinguish between reality-based and nonreality-based thinking  Description  Able to distinguish between reality-based and nonreality-based thinking  5/4/2019 0529 by Behzad Morgan RN  Outcome: Ongoing  Goal: Able to interrupt nonreality-based thinking  Description  Able to interrupt nonreality-based thinking  5/4/2019 0529 by Behzad Morgan RN  Outcome: Ongoing     Problem: Sleep Pattern Disturbance:  Goal: Appears well-rested  Description  Appears well-rested  5/4/2019 0529 by Behzad Morgan RN  Outcome: Ongoing

## 2019-05-04 NOTE — CARE COORDINATION
Transition Planning:  Spoke with Ruchi Noriega at [facility] she confirms that she has submitted precert but was waiting on PT/OT notes. Writer informed updated OT available and PT initial eval available to day. She will pull notes and submit for further clinical review. She does not anticipate a response before Monday regarding status of auth.

## 2019-05-05 LAB
ANION GAP SERPL CALCULATED.3IONS-SCNC: 11 MMOL/L (ref 9–17)
BUN BLDV-MCNC: 33 MG/DL (ref 8–23)
BUN/CREAT BLD: ABNORMAL (ref 9–20)
CALCIUM SERPL-MCNC: 8.1 MG/DL (ref 8.6–10.4)
CHLORIDE BLD-SCNC: 114 MMOL/L (ref 98–107)
CO2: 20 MMOL/L (ref 20–31)
CREAT SERPL-MCNC: 1.64 MG/DL (ref 0.7–1.2)
GFR AFRICAN AMERICAN: 49 ML/MIN
GFR NON-AFRICAN AMERICAN: 41 ML/MIN
GFR SERPL CREATININE-BSD FRML MDRD: ABNORMAL ML/MIN/{1.73_M2}
GFR SERPL CREATININE-BSD FRML MDRD: ABNORMAL ML/MIN/{1.73_M2}
GLUCOSE BLD-MCNC: 100 MG/DL (ref 70–99)
GLUCOSE BLD-MCNC: 106 MG/DL (ref 75–110)
GLUCOSE BLD-MCNC: 86 MG/DL (ref 75–110)
GLUCOSE BLD-MCNC: 88 MG/DL (ref 75–110)
GLUCOSE BLD-MCNC: 92 MG/DL (ref 75–110)
HCT VFR BLD CALC: 25.4 % (ref 40.7–50.3)
HCT VFR BLD CALC: 25.5 % (ref 40.7–50.3)
HCT VFR BLD CALC: 26.1 % (ref 40.7–50.3)
HCT VFR BLD CALC: 26.2 % (ref 40.7–50.3)
HEMOGLOBIN: 7.4 G/DL (ref 13–17)
HEMOGLOBIN: 7.7 G/DL (ref 13–17)
HEMOGLOBIN: 7.8 G/DL (ref 13–17)
HEMOGLOBIN: 7.8 G/DL (ref 13–17)
MCH RBC QN AUTO: 27.3 PG (ref 25.2–33.5)
MCHC RBC AUTO-ENTMCNC: 30.7 G/DL (ref 28.4–34.8)
MCV RBC AUTO: 88.8 FL (ref 82.6–102.9)
NRBC AUTOMATED: 0 PER 100 WBC
PDW BLD-RTO: 18 % (ref 11.8–14.4)
PLATELET # BLD: 282 K/UL (ref 138–453)
PMV BLD AUTO: 9.8 FL (ref 8.1–13.5)
POTASSIUM SERPL-SCNC: 4.5 MMOL/L (ref 3.7–5.3)
RBC # BLD: 2.86 M/UL (ref 4.21–5.77)
SODIUM BLD-SCNC: 145 MMOL/L (ref 135–144)
WBC # BLD: 5.7 K/UL (ref 3.5–11.3)

## 2019-05-05 PROCEDURE — 6370000000 HC RX 637 (ALT 250 FOR IP): Performed by: NURSE PRACTITIONER

## 2019-05-05 PROCEDURE — 1200000000 HC SEMI PRIVATE

## 2019-05-05 PROCEDURE — 80048 BASIC METABOLIC PNL TOTAL CA: CPT

## 2019-05-05 PROCEDURE — 6360000002 HC RX W HCPCS: Performed by: INTERNAL MEDICINE

## 2019-05-05 PROCEDURE — 85014 HEMATOCRIT: CPT

## 2019-05-05 PROCEDURE — 85027 COMPLETE CBC AUTOMATED: CPT

## 2019-05-05 PROCEDURE — 36415 COLL VENOUS BLD VENIPUNCTURE: CPT

## 2019-05-05 PROCEDURE — 82947 ASSAY GLUCOSE BLOOD QUANT: CPT

## 2019-05-05 PROCEDURE — 6370000000 HC RX 637 (ALT 250 FOR IP): Performed by: STUDENT IN AN ORGANIZED HEALTH CARE EDUCATION/TRAINING PROGRAM

## 2019-05-05 PROCEDURE — 85018 HEMOGLOBIN: CPT

## 2019-05-05 PROCEDURE — 94761 N-INVAS EAR/PLS OXIMETRY MLT: CPT

## 2019-05-05 PROCEDURE — 99232 SBSQ HOSP IP/OBS MODERATE 35: CPT | Performed by: INTERNAL MEDICINE

## 2019-05-05 RX ADMIN — HEPARIN SODIUM 5000 UNITS: 5000 INJECTION INTRAVENOUS; SUBCUTANEOUS at 14:52

## 2019-05-05 RX ADMIN — ASPIRIN 81 MG: 81 TABLET, CHEWABLE ORAL at 08:08

## 2019-05-05 RX ADMIN — METOPROLOL TARTRATE 25 MG: 25 TABLET ORAL at 08:08

## 2019-05-05 RX ADMIN — MIRTAZAPINE 30 MG: 30 TABLET, FILM COATED ORAL at 20:23

## 2019-05-05 RX ADMIN — HEPARIN SODIUM 5000 UNITS: 5000 INJECTION INTRAVENOUS; SUBCUTANEOUS at 22:02

## 2019-05-05 RX ADMIN — METOPROLOL TARTRATE 25 MG: 25 TABLET ORAL at 20:23

## 2019-05-05 RX ADMIN — TAMSULOSIN HYDROCHLORIDE 0.4 MG: 0.4 CAPSULE ORAL at 08:08

## 2019-05-05 RX ADMIN — HEPARIN SODIUM 5000 UNITS: 5000 INJECTION INTRAVENOUS; SUBCUTANEOUS at 06:06

## 2019-05-05 RX ADMIN — ACETAMINOPHEN 650 MG: 325 TABLET ORAL at 15:16

## 2019-05-05 RX ADMIN — DOCUSATE SODIUM 100 MG: 100 CAPSULE, LIQUID FILLED ORAL at 20:34

## 2019-05-05 ASSESSMENT — PAIN DESCRIPTION - LOCATION: LOCATION: HIP

## 2019-05-05 ASSESSMENT — PAIN SCALES - GENERAL: PAINLEVEL_OUTOF10: 4

## 2019-05-05 ASSESSMENT — PAIN DESCRIPTION - PAIN TYPE: TYPE: ACUTE PAIN

## 2019-05-05 NOTE — PLAN OF CARE
Problem: Falls - Risk of:  Goal: Will remain free from falls  Description  Will remain free from falls  5/5/2019 0347 by Raghu Alvarado RN  Outcome: Met This Shift     Problem: Falls - Risk of:  Goal: Absence of physical injury  Description  Absence of physical injury  5/5/2019 0347 by Raghu Alvarado RN  Outcome: Met This Shift     Problem: Injury - Risk of, Physical Injury:  Goal: Will remain free from falls  Description  Will remain free from falls  5/5/2019 0347 by Raghu Alvarado RN  Outcome: Met This Shift     Problem: Injury - Risk of, Physical Injury:  Goal: Absence of physical injury  Description  Absence of physical injury  5/5/2019 0347 by Raghu Alvarado RN  Outcome: Met This Shift     Problem: Risk for Impaired Skin Integrity  Goal: Tissue integrity - skin and mucous membranes  Description  Structural intactness and normal physiological function of skin and  mucous membranes.   5/5/2019 0347 by Raghu Alvarado RN  Outcome: Ongoing     Problem: Confusion - Acute:  Goal: Absence of continued neurological deterioration signs and symptoms  Description  Absence of continued neurological deterioration signs and symptoms  5/5/2019 0347 by Raghu Alvarado RN  Outcome: Ongoing     Problem: Confusion - Acute:  Goal: Mental status will be restored to baseline  Description  Mental status will be restored to baseline  5/5/2019 0347 by Raghu Alvarado RN  Outcome: Ongoing     Problem: Discharge Planning:  Goal: Ability to perform activities of daily living will improve  Description  Ability to perform activities of daily living will improve  5/5/2019 0347 by Raghu Alvarado RN  Outcome: Ongoing     Problem: Discharge Planning:  Goal: Participates in care planning  Description  Participates in care planning  5/5/2019 0347 by Raghu Alvarado RN  Outcome: Ongoing     Problem: Mood - Altered:  Goal: Mood stable  Description  Mood stable  5/5/2019 0347 by Raghu Alvarado RN  Outcome: Ongoing     Problem: Mood - Altered:  Goal: well-rested  Description  Appears well-rested  Outcome: Ongoing     Problem: Musculor/Skeletal Functional Status  Goal: Highest potential functional level  Outcome: Ongoing

## 2019-05-05 NOTE — PROGRESS NOTES
Nutrition Assessment    Type and Reason for Visit: (LOS Day 7)    Nutrition Recommendations: Continue general diet. Continue nepro supplements TID. Recommend adding magic cup supplements TID. Nutrition Assessment:  Pt nutritionally compromised aeb poor appetite - consuming 1-25% of his meals. Pt s/p left hip johny-arthroplasty d/t Lt hip fx. Interview was limited 2/2 pt's dementia. No wt loss noted in EMR over the past 5 months. Will add variety of supplements to compliment po intake. Staff to encourage adequate po/fluid intake. Malnutrition Assessment:  · Malnutrition Status: At risk for malnutrition  · Context: Acute illness or injury  · Findings of the 6 clinical characteristics of malnutrition (Minimum of 2 out of 6 clinical characteristics is required to make the diagnosis of moderate or severe Protein Calorie Malnutrition based on AND/ASPEN Guidelines):  1. Energy Intake-Less than or equal to 75% of estimated energy requirement, Greater than or equal to 7 days    2. Weight Loss-No significant weight loss,    3. Fat Loss-No significant subcutaneous fat loss,    4. Muscle Loss-No significant muscle mass loss,    5. Fluid Accumulation-No significant fluid accumulation,    6.  Strength-Not measured    Nutrition Risk Level:  Moderate    Nutrient Needs:  · Estimated Daily Total Kcal: 28-30 kcal/kg ~>2350-5164 kcals/d   · Estimated Daily Protein (g): 1.2-1.4 gm/kg ~>79-92 gms/d     Nutrition Diagnosis:   · Problem: Inadequate oral intake  · Etiology: related to Insufficient energy/nutrient consumption, Cognitive or neurological impairment     Signs and symptoms:  as evidenced by Intake 0-25%(pt report/interview)    Objective Information:  · Wound Type: Open Wounds, Berg  · Current Nutrition Therapies:  · Oral Diet Orders: General   · Oral Diet intake: 1-25%  · Oral Nutrition Supplement (ONS) Orders: Renal Oral Supplement  · ONS intake: Unable to assess  · Anthropometric Measures:  · Ht: 5' 4.96\" (994 cm)   · Current Body Wt: 147 lb (66.7 kg)  · Admission Body Wt: 145 lb (65.8 kg)  · % Weight Change:  ,  stable x 5 mo per EMR (145 lbs)  · Ideal Body Wt: 136 lb (61.7 kg), % Ideal Body 107% (adm/ideal)  · BMI Classification: BMI 18.5 - 24.9 Normal Weight    Nutrition Interventions:   Continue current diet, Modify current ONS  Continued Inpatient Monitoring, Education not appropriate at this time    Nutrition Evaluation:   · Evaluation: Goals set   · Goals: Pt to consume >50% of meals/supplements     · Monitoring: Nutrition Progression, Meal Intake, Supplement Intake, Diet Tolerance, Skin Integrity, Wound Healing, I&O, Weight, Pertinent Labs      Electronically signed by Alyssa Maldonado RD, LD on 5/5/19 at 10:54 AM    Contact Number: [phone]

## 2019-05-05 NOTE — PLAN OF CARE
Adams Mckinley 19    Second Visit Note  For more detailed information please refer to the progress note of the day      5/5/2019    5:37 PM    Name:   Gómez Almazan MRN:     [id number]     Acct:      [de-identified]   Room:   8076/4961-83   Day:  7  Admit Date:  4/28/2019  9:26 PM    PCP:   Alireza Cortez MD  Code Status:  Full Code        Pt vitals were reviewed   New labs were reviewed   Patient was seen    Updated plan :     1. No new complaints  2. Hemoglobin remained stable 7.7  3.  Patient is eating appropriately        [address] MD  5/5/2019  5:37 PM

## 2019-05-05 NOTE — PROGRESS NOTES
[facility]    Progress Note    5/5/2019    2:10 PM    Name:   Mick Rodriguez. MRN:     [id number]     Acct:      [de-identified]   Room:   [address] [place] [address] Day:  7  Admit Date:  4/28/2019  9:26 PM    PCP:   Nolan Arnold MD  Code Status:  Full Code    Subjective:     C/C:   Chief Complaint   Patient presents with    Facial Laceration     L upper eyelid s/p fall when he was up and struck bedrail in room. Denies loc    Hip Pain     L hip. unknown if related to fall pta     Interval History Status:   Patient had left hip hemiarthroplasty 5/2/19   Patient had drop in blood pressure and 60s while in OR suggestive of a causing MAYITO due to ATN  Potassium was high till  yesterday requiring insulin/dextrose/Kayexalate  Today potassium 4.5, BUN 33, creatinine 1.64  Hemoglobin 7.8  Blood glucose 09-95-  Patient denies any new symptoms        Brief History:   Patient has significant dementia and is a very poor historian. Admitted through ER with following history;     Fred Best Jr. is a 78 y. o. male who presents to the emergency department for evaluation of a fall.  Patient was ambulating, stumbled and fell onto a bed rail and then to the ground.  Sustained a laceration by his left eyebrow.  He did not lose consciousness or vomiting after the incident but he does suffer from dementia and seems to have a little bit of confusion.  Complains of left hip pain.  Patient denies any other acute complaints or injuries at this time.  This occurred just prior to arrival at [facility] longterm care [facility].   He was found to have left hip fracture on imaging  Patient is seen by orthopedics who plans to do surgery for fractured left hip once patient is medically cleared  Talk to his nephew on the phone who confirmed that patient is staying in nursing home and has quit smoking and drinking many years back and has significant dementia             Review of Systems: Limited information due to dementia:  Constitutional:  negative for chills, fevers, sweats, confused as before in answering  Respiratory:  negative for cough, dyspnea on exertion, shortness of breath, wheezing  Cardiovascular:  negative for chest pain, chest pressure/discomfort, lower extremity edema, palpitations  Gastrointestinal:  negative for abdominal pain, constipation, diarrhea, nausea, vomiting  Neurological:  negative for dizziness, headache    Medications: Allergies: Allergies   Allergen Reactions    Lisinopril     Peanut-Containing Drug Products        Current Meds:   Scheduled Meds:    heparin (porcine)  5,000 Units Subcutaneous 3 times per day    insulin regular  10 Units Subcutaneous Once    aspirin  81 mg Oral Daily    vitamin D  50,000 Units Oral Weekly    metoprolol tartrate  25 mg Oral BID    docusate sodium  100 mg Oral Nightly    mirtazapine  30 mg Oral Nightly    tamsulosin  0.4 mg Oral Daily    sodium chloride flush  10 mL Intravenous 2 times per day     Continuous Infusions:    dextrose 5 % and 0.9 % NaCl 125 mL/hr at 05/04/19 0820    dextrose       PRN Meds: glucose, dextrose, glucagon (rDNA), dextrose, LORazepam, sodium chloride flush, potassium chloride **OR** potassium alternative oral replacement **OR** potassium chloride, magnesium sulfate, magnesium hydroxide, ondansetron, nicotine, acetaminophen, oxyCODONE-acetaminophen **OR** oxyCODONE-acetaminophen, morphine **OR** morphine    Data:     Past Medical History:   has a past medical history of Arthritis, Dementia, Hypertension, and Spinal stenosis. Social History:   reports that he has quit smoking. He has never used smokeless tobacco. He reports that he does not drink alcohol or use drugs. Family History: History reviewed. No pertinent family history.     Vitals:  BP 95/79   Pulse 81   Temp 96.8 °F (36 °C) (Axillary)   Resp 18   Ht 5' 4.96\" (1.65 m)   Wt 147 lb 4.3 oz (66.8 kg)   SpO2 99%   BMI 24.54

## 2019-05-05 NOTE — PLAN OF CARE
Problem: Falls - Risk of:  Goal: Will remain free from falls  Description  Will remain free from falls  5/5/2019 1312 by Latosha Hidalgo RN  Outcome: Met This Shift  5/5/2019 0347 by Percy Guidry RN  Outcome: Met This Shift  Goal: Absence of physical injury  Description  Absence of physical injury  5/5/2019 1312 by Latosha Hidalgo RN  Outcome: Met This Shift  5/5/2019 0347 by Percy Guidry RN  Outcome: Met This Shift     Problem: Risk for Impaired Skin Integrity  Goal: Tissue integrity - skin and mucous membranes  Description  Structural intactness and normal physiological function of skin and  mucous membranes.   5/5/2019 1312 by Latosha Hidalgo RN  Outcome: Met This Shift  5/5/2019 0347 by Percy Guidry RN  Outcome: Ongoing     Problem: Confusion - Acute:  Goal: Absence of continued neurological deterioration signs and symptoms  Description  Absence of continued neurological deterioration signs and symptoms  5/5/2019 1312 by Latosha Hidalgo RN  Outcome: Met This Shift  5/5/2019 0347 by Percy Guidry RN  Outcome: Ongoing  Goal: Mental status will be restored to baseline  Description  Mental status will be restored to baseline  5/5/2019 1312 by Latosha Hidalgo RN  Outcome: Met This Shift  5/5/2019 0347 by Percy Guidry RN  Outcome: Ongoing     Problem: Discharge Planning:  Goal: Ability to perform activities of daily living will improve  Description  Ability to perform activities of daily living will improve  5/5/2019 1312 by Latosha Hidalgo RN  Outcome: Met This Shift  5/5/2019 0347 by Percy Guidry RN  Outcome: Ongoing  Goal: Participates in care planning  Description  Participates in care planning  5/5/2019 1312 by Latosha Hidalgo RN  Outcome: Met This Shift  5/5/2019 0347 by Percy Guidry RN  Outcome: Ongoing     Problem: Injury - Risk of, Physical Injury:  Goal: Will remain free from falls  Description  Will remain free from falls  5/5/2019 1312 by Latosha Hidalgo RN  Outcome: Met This Magen Birmingham RN  Outcome: Met This Shift  5/5/2019 0347 by Kuldip Masterson RN  Outcome: Ongoing  Goal: Demonstrates accurate environmental perceptions  Description  Demonstrates accurate environmental perceptions  5/5/2019 1312 by Jessica Wong RN  Outcome: Met This Shift  5/5/2019 0347 by Kuldip Masterson RN  Outcome: Ongoing  Goal: Able to distinguish between reality-based and nonreality-based thinking  Description  Able to distinguish between reality-based and nonreality-based thinking  5/5/2019 1312 by Jessica Wong RN  Outcome: Met This Shift  5/5/2019 0347 by Kuldip Masterson RN  Outcome: Ongoing  Goal: Able to interrupt nonreality-based thinking  Description  Able to interrupt nonreality-based thinking  5/5/2019 1312 by Jessica Wong RN  Outcome: Met This Shift  5/5/2019 0347 by Kuldip Masterson RN  Outcome: Ongoing     Problem: Sleep Pattern Disturbance:  Goal: Appears well-rested  Description  Appears well-rested  5/5/2019 1312 by Jessica Wong RN  Outcome: Met This Shift  5/5/2019 0347 by Kuldip Masterson RN  Outcome: Ongoing     Problem: Musculor/Skeletal Functional Status  Goal: Highest potential functional level  5/5/2019 1312 by Jessica Wong RN  Outcome: Met This Shift  5/5/2019 0347 by Kuldip Masterson RN  Outcome: Ongoing     Problem: Pain:  Goal: Pain level will decrease  Description  Pain level will decrease  Outcome: Met This Shift  Goal: Control of acute pain  Description  Control of acute pain  Outcome: Met This Shift  Goal: Control of chronic pain  Description  Control of chronic pain  Outcome: Met This Shift     Problem: Nutrition  Goal: Optimal nutrition therapy  5/5/2019 1312 by Jessica Wong RN  Outcome: Met This Shift  5/5/2019 1058 by Saul Mcmanus RD, LD  Outcome: Ongoing  Note:   Nutrition Problem: Inadequate oral intake  Intervention: Food and/or Nutrient Delivery: Continue current diet, Modify current ONS  Nutritional Goals: Pt to consume >50% of meals/supplements

## 2019-05-06 LAB
ABSOLUTE EOS #: 0.13 K/UL (ref 0–0.4)
ABSOLUTE IMMATURE GRANULOCYTE: 0.04 K/UL (ref 0–0.3)
ABSOLUTE LYMPH #: 0.62 K/UL (ref 1–4.8)
ABSOLUTE MONO #: 0.09 K/UL (ref 0.1–0.8)
ALBUMIN SERPL-MCNC: 2.4 G/DL (ref 3.5–5.2)
ALBUMIN/GLOBULIN RATIO: 0.6 (ref 1–2.5)
ALP BLD-CCNC: 68 U/L (ref 40–129)
ALT SERPL-CCNC: <5 U/L (ref 5–41)
ANION GAP SERPL CALCULATED.3IONS-SCNC: 11 MMOL/L (ref 9–17)
AST SERPL-CCNC: 18 U/L
BASOPHILS # BLD: 0 % (ref 0–2)
BASOPHILS ABSOLUTE: 0 K/UL (ref 0–0.2)
BILIRUB SERPL-MCNC: 0.25 MG/DL (ref 0.3–1.2)
BILIRUBIN DIRECT: 0.1 MG/DL
BILIRUBIN, INDIRECT: 0.15 MG/DL (ref 0–1)
BUN BLDV-MCNC: 26 MG/DL (ref 8–23)
BUN/CREAT BLD: ABNORMAL (ref 9–20)
CALCIUM SERPL-MCNC: 8.4 MG/DL (ref 8.6–10.4)
CHLORIDE BLD-SCNC: 113 MMOL/L (ref 98–107)
CO2: 18 MMOL/L (ref 20–31)
CREAT SERPL-MCNC: 1.34 MG/DL (ref 0.7–1.2)
DIFFERENTIAL TYPE: ABNORMAL
EKG ATRIAL RATE: 80 BPM
EKG P AXIS: 28 DEGREES
EKG P-R INTERVAL: 128 MS
EKG Q-T INTERVAL: 372 MS
EKG QRS DURATION: 74 MS
EKG QTC CALCULATION (BAZETT): 429 MS
EKG R AXIS: 39 DEGREES
EKG T AXIS: 27 DEGREES
EKG VENTRICULAR RATE: 80 BPM
EOSINOPHILS RELATIVE PERCENT: 3 % (ref 1–4)
FOLATE: 7.9 NG/ML
GFR AFRICAN AMERICAN: >60 ML/MIN
GFR NON-AFRICAN AMERICAN: 51 ML/MIN
GFR SERPL CREATININE-BSD FRML MDRD: ABNORMAL ML/MIN/{1.73_M2}
GFR SERPL CREATININE-BSD FRML MDRD: ABNORMAL ML/MIN/{1.73_M2}
GLOBULIN: ABNORMAL G/DL (ref 1.5–3.8)
GLUCOSE BLD-MCNC: 114 MG/DL (ref 75–110)
GLUCOSE BLD-MCNC: 87 MG/DL (ref 70–99)
GLUCOSE BLD-MCNC: 90 MG/DL (ref 75–110)
GLUCOSE BLD-MCNC: 96 MG/DL (ref 75–110)
HCT VFR BLD CALC: 27.5 % (ref 40.7–50.3)
HCT VFR BLD CALC: 28.2 % (ref 40.7–50.3)
HEMOGLOBIN: 8.2 G/DL (ref 13–17)
HEMOGLOBIN: 8.2 G/DL (ref 13–17)
IMMATURE GRANULOCYTES: 1 %
LYMPHOCYTES # BLD: 14 % (ref 24–44)
MCH RBC QN AUTO: 26.8 PG (ref 25.2–33.5)
MCHC RBC AUTO-ENTMCNC: 29.1 G/DL (ref 28.4–34.8)
MCV RBC AUTO: 92.2 FL (ref 82.6–102.9)
MONOCYTES # BLD: 2 % (ref 1–7)
MORPHOLOGY: ABNORMAL
NRBC AUTOMATED: 0 PER 100 WBC
PDW BLD-RTO: 18.2 % (ref 11.8–14.4)
PLATELET # BLD: 332 K/UL (ref 138–453)
PLATELET ESTIMATE: ABNORMAL
PMV BLD AUTO: 9.3 FL (ref 8.1–13.5)
POTASSIUM SERPL-SCNC: 4.5 MMOL/L (ref 3.7–5.3)
RBC # BLD: 3.06 M/UL (ref 4.21–5.77)
RBC # BLD: ABNORMAL 10*6/UL
SEG NEUTROPHILS: 80 % (ref 36–66)
SEGMENTED NEUTROPHILS ABSOLUTE COUNT: 3.52 K/UL (ref 1.8–7.7)
SODIUM BLD-SCNC: 142 MMOL/L (ref 135–144)
TOTAL PROTEIN: 6.4 G/DL (ref 6.4–8.3)
VITAMIN B-12: 985 PG/ML (ref 232–1245)
WBC # BLD: 4.4 K/UL (ref 3.5–11.3)
WBC # BLD: ABNORMAL 10*3/UL

## 2019-05-06 PROCEDURE — 2580000003 HC RX 258: Performed by: INTERNAL MEDICINE

## 2019-05-06 PROCEDURE — 82947 ASSAY GLUCOSE BLOOD QUANT: CPT

## 2019-05-06 PROCEDURE — 85025 COMPLETE CBC W/AUTO DIFF WBC: CPT

## 2019-05-06 PROCEDURE — 6370000000 HC RX 637 (ALT 250 FOR IP): Performed by: STUDENT IN AN ORGANIZED HEALTH CARE EDUCATION/TRAINING PROGRAM

## 2019-05-06 PROCEDURE — 97116 GAIT TRAINING THERAPY: CPT

## 2019-05-06 PROCEDURE — 36415 COLL VENOUS BLD VENIPUNCTURE: CPT

## 2019-05-06 PROCEDURE — 95819 EEG AWAKE AND ASLEEP: CPT

## 2019-05-06 PROCEDURE — 80048 BASIC METABOLIC PNL TOTAL CA: CPT

## 2019-05-06 PROCEDURE — 95819 EEG AWAKE AND ASLEEP: CPT | Performed by: PSYCHIATRY & NEUROLOGY

## 2019-05-06 PROCEDURE — 82607 VITAMIN B-12: CPT

## 2019-05-06 PROCEDURE — 99232 SBSQ HOSP IP/OBS MODERATE 35: CPT | Performed by: INTERNAL MEDICINE

## 2019-05-06 PROCEDURE — 6360000002 HC RX W HCPCS: Performed by: INTERNAL MEDICINE

## 2019-05-06 PROCEDURE — 1200000000 HC SEMI PRIVATE

## 2019-05-06 PROCEDURE — 82746 ASSAY OF FOLIC ACID SERUM: CPT

## 2019-05-06 PROCEDURE — 93005 ELECTROCARDIOGRAM TRACING: CPT

## 2019-05-06 PROCEDURE — 80076 HEPATIC FUNCTION PANEL: CPT

## 2019-05-06 PROCEDURE — 85018 HEMOGLOBIN: CPT

## 2019-05-06 PROCEDURE — 6370000000 HC RX 637 (ALT 250 FOR IP): Performed by: NURSE PRACTITIONER

## 2019-05-06 PROCEDURE — 6370000000 HC RX 637 (ALT 250 FOR IP): Performed by: PSYCHIATRY & NEUROLOGY

## 2019-05-06 PROCEDURE — 85014 HEMATOCRIT: CPT

## 2019-05-06 PROCEDURE — 97535 SELF CARE MNGMENT TRAINING: CPT

## 2019-05-06 PROCEDURE — 99222 1ST HOSP IP/OBS MODERATE 55: CPT | Performed by: PSYCHIATRY & NEUROLOGY

## 2019-05-06 PROCEDURE — 84443 ASSAY THYROID STIM HORMONE: CPT

## 2019-05-06 RX ORDER — LORAZEPAM 2 MG/ML
2 INJECTION INTRAMUSCULAR EVERY 4 HOURS PRN
Status: DISCONTINUED | OUTPATIENT
Start: 2019-05-06 | End: 2019-05-08 | Stop reason: HOSPADM

## 2019-05-06 RX ORDER — DONEPEZIL HYDROCHLORIDE 10 MG/1
5 TABLET, FILM COATED ORAL NIGHTLY
Status: DISCONTINUED | OUTPATIENT
Start: 2019-05-06 | End: 2019-05-08 | Stop reason: HOSPADM

## 2019-05-06 RX ADMIN — DONEPEZIL HYDROCHLORIDE 5 MG: 10 TABLET, FILM COATED ORAL at 23:40

## 2019-05-06 RX ADMIN — METOPROLOL TARTRATE 25 MG: 25 TABLET ORAL at 07:37

## 2019-05-06 RX ADMIN — ASPIRIN 81 MG: 81 TABLET, CHEWABLE ORAL at 07:38

## 2019-05-06 RX ADMIN — METOPROLOL TARTRATE 25 MG: 25 TABLET ORAL at 23:12

## 2019-05-06 RX ADMIN — MIRTAZAPINE 30 MG: 30 TABLET, FILM COATED ORAL at 23:12

## 2019-05-06 RX ADMIN — DOCUSATE SODIUM 100 MG: 100 CAPSULE, LIQUID FILLED ORAL at 23:12

## 2019-05-06 RX ADMIN — DEXTROSE AND SODIUM CHLORIDE: 5; 900 INJECTION, SOLUTION INTRAVENOUS at 19:17

## 2019-05-06 RX ADMIN — HEPARIN SODIUM 5000 UNITS: 5000 INJECTION INTRAVENOUS; SUBCUTANEOUS at 23:12

## 2019-05-06 RX ADMIN — TAMSULOSIN HYDROCHLORIDE 0.4 MG: 0.4 CAPSULE ORAL at 07:38

## 2019-05-06 RX ADMIN — HEPARIN SODIUM 5000 UNITS: 5000 INJECTION INTRAVENOUS; SUBCUTANEOUS at 06:05

## 2019-05-06 RX ADMIN — HEPARIN SODIUM 5000 UNITS: 5000 INJECTION INTRAVENOUS; SUBCUTANEOUS at 14:33

## 2019-05-06 ASSESSMENT — PAIN SCALES - GENERAL
PAINLEVEL_OUTOF10: 0
PAINLEVEL_OUTOF10: 0

## 2019-05-06 ASSESSMENT — PAIN DESCRIPTION - LOCATION: LOCATION: HIP

## 2019-05-06 ASSESSMENT — ENCOUNTER SYMPTOMS
GASTROINTESTINAL NEGATIVE: 1
RESPIRATORY NEGATIVE: 1
EYES NEGATIVE: 1

## 2019-05-06 ASSESSMENT — PAIN DESCRIPTION - PAIN TYPE: TYPE: SURGICAL PAIN
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
## 2019-05-06 NOTE — PROGRESS NOTES
Waiting for family member to complete MRI checklist via in person or via phone with RN, RN notified and will attempt to contact patient's family.

## 2019-05-06 NOTE — PROGRESS NOTES
General  Chart Reviewed: No  Patient assessed for rehabilitation services?: Yes  Family / Caregiver Present: No  Diagnosis: fall  Subjective  Pain Assessment  Pain Type: Surgical pain  Pain Location: Hip  Vital Signs  Patient Currently in Pain: Other (comment)(Pt unable to verbalize pain rating on L hip d/t dementia. Pt reaches for L hip after transferring to seated EOB and rubbing L leg as if in discomfort. Pt displayed facial grimacing during rolling in bed when laying on L hip.)   Orientation  Orientation  Overall Orientation Status: Impaired  Orientation Level: Oriented to person;Disoriented to time;Disoriented to place; Disoriented to situation  Objective    ADL  Toileting: Maximum assistance(Pericare/brief mgnt supine in bed.)  Additional Comments: Pt completed ADL care with floor aid prior to therapist arrival.  Balance  Sitting Balance: Contact guard assistance(SBA/CGA seated EOB. Occassional verbal cues d/t pt displaying occassional posterior lean with good return.)  Standing Balance: Minimal assistance  Standing Balance  Time: 2 minutes  Activity: First attempt using RW static standing EOB, second attempt using lisa stedy. Comment: Pt unable to fully extend L leg and tuck bottom in during standing. Bed mobility  Rolling to Left: Minimal assistance  Rolling to Right: Minimal assistance  Supine to Sit: Moderate assistance  Scooting: Minimal assistance  Transfers  Sit to stand: Minimal assistance(Completed x2.)  Stand to sit: Moderate assistance;2 Person assistance(To control descent into chair.)  Cognition  Overall Cognitive Status: Exceptions  Arousal/Alertness: Delayed responses to stimuli  Following Commands: Follows one step commands with repetition; Follows one step commands with increased time  Attention Span: Attends with cues to redirect  Memory: Decreased recall of recent events;Decreased short term memory;Decreased recall of biographical Information  Safety Judgement: Decreased awareness of need for assistance;Decreased awareness of need for safety  Problem Solving: Assistance required to correct errors made;Assistance required to identify errors made  Initiation: Requires cues for all  Sequencing: Requires cues for all  Cognition Comment: Pt with baseline dementia. Plan   Plan  Times per week: 4-5x/wk    Goals  Short term goals  Time Frame for Short term goals: pt will, by discharge  Short term goal 1: maintain attention to task for ~8 minutes with min verbal cues for redirection  Short term goal 2: follow 80% of commands during session with min verbal cues  Short term goal 3: dem mod A during functional transfers/functional mobility with LRD  Short term goal 4: dem ~5 minutes static standing tolerance with LRD and mod A in order to increase ability to complete ADLs  Short term goal 5: complete LB ADLs with mod A ,set up and AE, as needed  Short term goal 6: complete UB ADLs and grooming tasks with min A, set up and min verbal cues for initiation        Therapy Time   Individual Concurrent Group Co-treatment   Time In 0915         Time Out 0940         Minutes 25            Pt supine in bed upon therapist arrival. Pleasant ly confused and agreeable to therapy. See above for LOF for all tasks. Pt retired to seated in chair at end of session with chair alarm activated and call light within reach.     ELISEO Subramanian/CHARLA

## 2019-05-06 NOTE — PROGRESS NOTES
[facility]    Progress Note    5/6/2019    1:25 PM    Name:   Lizet Cleary. MRN:     [id number]     Acct:      [de-identified]   Room:   [address] [place] [address]  IP Day:  8  Admit Date:  4/28/2019  9:26 PM    PCP:   Mena Napier MD  Code Status:  Full Code    Subjective:     C/C:   Chief Complaint   Patient presents with    Facial Laceration     L upper eyelid s/p fall when he was up and struck bedrail in room. Denies loc    Hip Pain     L hip. unknown if related to fall pta     Interval History Status: not changed. Concern for seizure like activity overnight  Had episode of lateral eye movement and stiffness  Neurology consulted, workup ordered  No complaints this AM  Has baseline dementia  No other complaints      Brief History:     Reagan Guzman Jr. is a 78 y. o. male with PMH significant for dementia who presented to the emergency department for evaluation of a fall.  Patient was ambulating, stumbled and fell onto a bed rail and then to the ground.  Sustained a laceration by his left eyebrow.  He did not lose consciousness or vomiting after the incident but he does suffer from dementia and seems to have a little bit of confusion.  Complains of left hip pain.  Patient denies any other acute complaints or injuries at this time.  This occurred just prior to arrival at [facility].     He was found to have left hip fracture on imaging. Patient is seen by orthopedics who plans to do surgery for fractured left hip once patient is medically cleared. Underwent left hip hemiarthroplasty 5/2/19   Patient had drop in blood pressure and 60s while in OR suggestive of a causing MAYITO due to ATN  Developed hyperkalemia requiring insulin/dextrose/Kayexalate      Review of Systems:     Unable to assess due to dementia     Medications: Allergies:     Allergies   Allergen Reactions    Lisinopril     Peanut-Containing Drug Products RBC 2.88*  --  2.86*  --   --   --  3.06*   HGB 7.7*   < > 7.8*   < > 7.4* 8.2* 8.2*   HCT 25.3*   < > 25.4*   < > 25.5* 27.5* 28.2*   MCV 87.8  --  88.8  --   --   --  92.2   MCH 26.7  --  27.3  --   --   --  26.8   MCHC 30.4  --  30.7  --   --   --  29.1   RDW 17.9*  --  18.0*  --   --   --  18.2*     --  282  --   --   --  332   MPV 9.3  --  9.8  --   --   --  9.3    < > = values in this interval not displayed.      Chemistry:  Recent Labs     05/04/19  1343 05/05/19  0528 05/06/19 0428   * 145* 142   K 5.3 4.5 4.5   * 114* 113*   CO2 19* 20 18*   GLUCOSE 40* 100* 87   BUN 37* 33* 26*   CREATININE 1.90* 1.64* 1.34*   ANIONGAP 12 11 11   LABGLOM 34* 41* 51*   GFRAA 42* 49* >60   CALCIUM 8.1* 8.1* 8.4*     Recent Labs     05/04/19  2107 05/05/19  0654 05/05/19  1226 05/05/19  1624 05/05/19  2311 05/06/19  0232 05/06/19  0428   PROT  --   --   --   --   --   --  6.4   LABALBU  --   --   --   --   --   --  2.4*   AST  --   --   --   --   --   --  18   ALT  --   --   --   --   --   --  <5*   ALKPHOS  --   --   --   --   --   --  68   BILITOT  --   --   --   --   --   --  0.25*   BILIDIR  --   --   --   --   --   --  0.10   POCGLU 114* 106 88 86 92 90  --          Lab Results   Component Value Date/Time    SPECIAL L AC 10 12/18/2018 07:47 PM     Lab Results   Component Value Date/Time    CULTURE NO GROWTH 6 DAYS 12/18/2018 07:47 PM       Lab Results   Component Value Date    FIO2 NOT REPORTED 12/18/2018       Physical Examination:        General appearance:  alert, no distress  Mental Status:  Disoriented, baseline dementia   Lungs:  clear to auscultation bilaterally, normal effort  Heart:  regular rate and rhythm  Abdomen:  soft, nontender, nondistended, normal bowel sounds  Extremities:  no edema, redness, tenderness in the calves  Skin:  no gross lesions, rashes, induration    Assessment:        Primary Problem  Closed displaced fracture of left femoral neck [facility])    Active Hospital Problems

## 2019-05-06 NOTE — PROGRESS NOTES
chair upon arrival, returned to sitting following ambulation  Transfers  Sit to Stand: Moderate Assistance;2 Person Assistance  Stand to sit: Moderate Assistance;2 Person Assistance  Comment: Performed transfers 3x from lower surface. Mod A x2 provided for BUE HHA and blocking BLE to prevent anterior sliding with transfer. VC's for sequencing with fair return demo. Improved ability to follow one step commands noted with tactile cues. Ambulation  Ambulation?: Yes  More Ambulation?: Yes  Ambulation 1  Surface: level tile  Device: Rolling Walker  Other Apparatus: (IV pole)  Assistance: Maximum assistance;2 Person assistance  Quality of Gait: unsteady, BLE buckling, flexed posture, posterior lean  Distance: 5ft  Comments: Pt returned to sitting, unsafe to attempt further ambulation  Ambulation 2  Surface - 2: level tile  Device 2: (lift walker)  Other Apparatus 2: Wheelchair follow(IV pole)  Assistance 2: Moderate assistance;2 Person assistance(with third to assist for chair follow)  Quality of Gait 2: narrow RAFAELA, flexed posture, decreased heel strike and foot flat bilaterally, unsteady- however improved from RW  Distance: 80ft + 60ft  Stairs/Curb  Stairs?: No     Balance  Posture: Fair  Sitting - Static: Fair;+  Sitting - Dynamic: Fair  Standing - Static: Poor;+  Standing - Dynamic: Poor  Comments: Standing balance assessed with lift walker          Exercises deferred this date d/t lethargy following ambulation- pt unable to stay awake to complete. Assessment   Body structures, Functions, Activity limitations: Decreased functional mobility ; Decreased strength;Decreased balance;Decreased endurance;Decreased safe awareness  Assessment: Improved mobility noted this date as pt ambulated 80ft + 60ft with lift walker and Mod A x2. The pt requires 1-2 assist for all mobility and is unsafe to ambulate household distances.   Prognosis: Good  Patient Education: PT POC  REQUIRES PT FOLLOW UP: Yes  Activity Tolerance  Activity Tolerance: Patient limited by endurance       Goals  Short term goals  Time Frame for Short term goals: 14 visits  Short term goal 1: Pt will be Adela to EOB  Short term goal 2: Pt will be Adela to stand  Short term goal 3: Pt will stand 2min CGA with least restrictive AD  Short term goal 4: Ambulate 200ft with least restrictive AD and Mod A    Plan    Plan  Times per week: 5-6x/wk  Current Treatment Recommendations: Strengthening, Balance Training, Functional Mobility Training, Transfer Training, Endurance Training, Safety Education & Training, Home Exercise Program, Patient/Caregiver Education & Training, Equipment Evaluation, Education, & procurement  Safety Devices  Type of devices: Call light within reach, Nurse notified, Gait belt, Patient at risk for falls, Chair alarm in place, Left in chair, All fall risk precautions in place  Restraints  Initially in place: No     Therapy Time   Individual Concurrent Group Co-treatment   Time In 1016         Time Out 1045         Minutes Luc 39, PT

## 2019-05-06 NOTE — CARE COORDINATION
Transitional Planning  Called [phone] [facility] spoke with Crystal Davis she is just arriving at office and will call one she checks status of precert.  The precert was started on Friday  Await call back      Called Sharon to check how long precert is good  She states the precert is good through 5/7

## 2019-05-06 NOTE — PROGRESS NOTES
Pt had seizure like activities that lasted only a few seconds. Pt now back to baseline. Bp 158/78 with pulse of 79. Blood sugar 90. [place] notified. New orders received and consult to neurology. Notified neuro of new consult.

## 2019-05-06 NOTE — PROGRESS NOTES
Patient unable to assist with MRI checklist. Will call patient's emergency contact, Sussy Joe, to help fill this out. Message left with patient's nephew, Sussy Joe, at [phone] to call me for an update and assistance with filling out paper work.

## 2019-05-06 NOTE — PROGRESS NOTES
1700 pt  Aox1 to self, no c/o pain, assist pt with feeding,  IVF infusing [email or web address] to left FA, site secured, abduction pillow in place, left hip dressing in place, CDI, family present, pt safety maintained.

## 2019-05-07 LAB
ALBUMIN SERPL-MCNC: 2.7 G/DL (ref 3.5–5.2)
ALBUMIN/GLOBULIN RATIO: 0.7 (ref 1–2.5)
ALP BLD-CCNC: 74 U/L (ref 40–129)
ALT SERPL-CCNC: 8 U/L (ref 5–41)
ANION GAP SERPL CALCULATED.3IONS-SCNC: 8 MMOL/L (ref 9–17)
AST SERPL-CCNC: 21 U/L
BILIRUB SERPL-MCNC: 0.22 MG/DL (ref 0.3–1.2)
BUN BLDV-MCNC: 23 MG/DL (ref 8–23)
BUN/CREAT BLD: ABNORMAL (ref 9–20)
CALCIUM SERPL-MCNC: 8.5 MG/DL (ref 8.6–10.4)
CHLORIDE BLD-SCNC: 114 MMOL/L (ref 98–107)
CO2: 22 MMOL/L (ref 20–31)
CREAT SERPL-MCNC: 1.23 MG/DL (ref 0.7–1.2)
GFR AFRICAN AMERICAN: >60 ML/MIN
GFR NON-AFRICAN AMERICAN: 57 ML/MIN
GFR SERPL CREATININE-BSD FRML MDRD: ABNORMAL ML/MIN/{1.73_M2}
GFR SERPL CREATININE-BSD FRML MDRD: ABNORMAL ML/MIN/{1.73_M2}
GLUCOSE BLD-MCNC: 82 MG/DL (ref 70–99)
HCT VFR BLD CALC: 28.4 % (ref 40.7–50.3)
HEMOGLOBIN: 8.2 G/DL (ref 13–17)
MCH RBC QN AUTO: 27.2 PG (ref 25.2–33.5)
MCHC RBC AUTO-ENTMCNC: 28.9 G/DL (ref 28.4–34.8)
MCV RBC AUTO: 94 FL (ref 82.6–102.9)
NRBC AUTOMATED: 0 PER 100 WBC
PDW BLD-RTO: 18.4 % (ref 11.8–14.4)
PLATELET # BLD: 321 K/UL (ref 138–453)
PMV BLD AUTO: 9.1 FL (ref 8.1–13.5)
POTASSIUM SERPL-SCNC: 4 MMOL/L (ref 3.7–5.3)
RBC # BLD: 3.02 M/UL (ref 4.21–5.77)
SODIUM BLD-SCNC: 144 MMOL/L (ref 135–144)
TOTAL PROTEIN: 6.6 G/DL (ref 6.4–8.3)
WBC # BLD: 3.9 K/UL (ref 3.5–11.3)

## 2019-05-07 PROCEDURE — 6370000000 HC RX 637 (ALT 250 FOR IP): Performed by: PSYCHIATRY & NEUROLOGY

## 2019-05-07 PROCEDURE — 6370000000 HC RX 637 (ALT 250 FOR IP): Performed by: STUDENT IN AN ORGANIZED HEALTH CARE EDUCATION/TRAINING PROGRAM

## 2019-05-07 PROCEDURE — 2580000003 HC RX 258: Performed by: STUDENT IN AN ORGANIZED HEALTH CARE EDUCATION/TRAINING PROGRAM

## 2019-05-07 PROCEDURE — 80053 COMPREHEN METABOLIC PANEL: CPT

## 2019-05-07 PROCEDURE — 6360000002 HC RX W HCPCS: Performed by: INTERNAL MEDICINE

## 2019-05-07 PROCEDURE — 85027 COMPLETE CBC AUTOMATED: CPT

## 2019-05-07 PROCEDURE — 94761 N-INVAS EAR/PLS OXIMETRY MLT: CPT

## 2019-05-07 PROCEDURE — 97535 SELF CARE MNGMENT TRAINING: CPT

## 2019-05-07 PROCEDURE — 36415 COLL VENOUS BLD VENIPUNCTURE: CPT

## 2019-05-07 PROCEDURE — 1200000000 HC SEMI PRIVATE

## 2019-05-07 PROCEDURE — 99232 SBSQ HOSP IP/OBS MODERATE 35: CPT | Performed by: INTERNAL MEDICINE

## 2019-05-07 PROCEDURE — 2580000003 HC RX 258: Performed by: INTERNAL MEDICINE

## 2019-05-07 PROCEDURE — 97530 THERAPEUTIC ACTIVITIES: CPT

## 2019-05-07 PROCEDURE — 6370000000 HC RX 637 (ALT 250 FOR IP): Performed by: NURSE PRACTITIONER

## 2019-05-07 PROCEDURE — 97116 GAIT TRAINING THERAPY: CPT

## 2019-05-07 PROCEDURE — 99232 SBSQ HOSP IP/OBS MODERATE 35: CPT | Performed by: PSYCHIATRY & NEUROLOGY

## 2019-05-07 RX ORDER — HEPARIN SODIUM 5000 [USP'U]/ML
5000 INJECTION, SOLUTION INTRAVENOUS; SUBCUTANEOUS EVERY 8 HOURS SCHEDULED
Qty: 69 ML | Refills: 0 | Status: SHIPPED | OUTPATIENT
Start: 2019-05-07 | End: 2019-05-15 | Stop reason: ALTCHOICE

## 2019-05-07 RX ORDER — DONEPEZIL HYDROCHLORIDE 5 MG/1
5 TABLET, FILM COATED ORAL NIGHTLY
Qty: 30 TABLET | Refills: 3 | Status: SHIPPED | OUTPATIENT
Start: 2019-05-07 | End: 2019-06-13

## 2019-05-07 RX ADMIN — ASPIRIN 81 MG: 81 TABLET, CHEWABLE ORAL at 09:29

## 2019-05-07 RX ADMIN — DOCUSATE SODIUM 100 MG: 100 CAPSULE, LIQUID FILLED ORAL at 22:17

## 2019-05-07 RX ADMIN — HEPARIN SODIUM 5000 UNITS: 5000 INJECTION INTRAVENOUS; SUBCUTANEOUS at 06:39

## 2019-05-07 RX ADMIN — ERGOCALCIFEROL 50000 UNITS: 1.25 CAPSULE ORAL at 09:29

## 2019-05-07 RX ADMIN — METOPROLOL TARTRATE 25 MG: 25 TABLET ORAL at 22:16

## 2019-05-07 RX ADMIN — METOPROLOL TARTRATE 25 MG: 25 TABLET ORAL at 09:29

## 2019-05-07 RX ADMIN — MIRTAZAPINE 30 MG: 30 TABLET, FILM COATED ORAL at 22:17

## 2019-05-07 RX ADMIN — HEPARIN SODIUM 5000 UNITS: 5000 INJECTION INTRAVENOUS; SUBCUTANEOUS at 22:18

## 2019-05-07 RX ADMIN — HEPARIN SODIUM 5000 UNITS: 5000 INJECTION INTRAVENOUS; SUBCUTANEOUS at 15:09

## 2019-05-07 RX ADMIN — DONEPEZIL HYDROCHLORIDE 5 MG: 10 TABLET, FILM COATED ORAL at 22:17

## 2019-05-07 RX ADMIN — DEXTROSE AND SODIUM CHLORIDE: 5; 900 INJECTION, SOLUTION INTRAVENOUS at 11:09

## 2019-05-07 RX ADMIN — Medication 10 ML: at 09:33

## 2019-05-07 RX ADMIN — Medication 10 ML: at 22:17

## 2019-05-07 RX ADMIN — DEXTROSE AND SODIUM CHLORIDE: 5; 900 INJECTION, SOLUTION INTRAVENOUS at 03:08

## 2019-05-07 RX ADMIN — TAMSULOSIN HYDROCHLORIDE 0.4 MG: 0.4 CAPSULE ORAL at 09:29

## 2019-05-07 ASSESSMENT — PAIN SCALES - GENERAL
PAINLEVEL_OUTOF10: 0

## 2019-05-07 NOTE — PROGRESS NOTES
Medications:     donepezil  5 mg Oral Nightly    heparin (porcine)  5,000 Units Subcutaneous 3 times per day    insulin regular  10 Units Subcutaneous Once    aspirin  81 mg Oral Daily    vitamin D  50,000 Units Oral Weekly    metoprolol tartrate  25 mg Oral BID    docusate sodium  100 mg Oral Nightly    mirtazapine  30 mg Oral Nightly    tamsulosin  0.4 mg Oral Daily    sodium chloride flush  10 mL Intravenous 2 times per day     PRN Meds include: LORazepam, glucose, dextrose, glucagon (rDNA), dextrose, LORazepam, sodium chloride flush, potassium chloride **OR** potassium alternative oral replacement **OR** potassium chloride, magnesium sulfate, magnesium hydroxide, ondansetron, nicotine, acetaminophen, oxyCODONE-acetaminophen **OR** oxyCODONE-acetaminophen, morphine **OR** morphine    Objective:   BP (!) 156/76   Pulse 98   Temp 97.5 °F (36.4 °C) (Oral)   Resp 18   Ht 5' 4.96\" (1.65 m)   Wt 147 lb 4.3 oz (66.8 kg)   SpO2 98%   BMI 24.54 kg/m²     Blood pressure range: Systolic (97BTV), XML:518 , Min:156 , ACH:048   ; Diastolic (08KTH), GAF:72, Min:76, Max:88      ROS:  CONSTITUTIONAL: negative for fatigue and malaise   EYES: negative for double vision and photophobia    HEENT: negative for tinnitus and sore throat   RESPIRATORY: negative for cough, shortness of breath   CARDIOVASCULAR: negative for chest pain, palpitations, or syncope   GASTROINTESTINAL: negative for abdominal pain, nausea, vomiting, diarrhea, or constipation    GENITOURINARY: negative for incontinence or retention    MUSCULOSKELETAL: Left shoulder pain    NEUROLOGICAL: Confusion    PSYCHIATRIC: negative for agitation, hallucination, SI/HI   SKIN Negative for spontaneous contusions, rashes, or lesions        NEUROLOGIC EXAMINATION  GENERAL  Baseline confusion as  history of dementia    HEENT  Left eyebrow laceration    HEART  S1 and S2 heard; palpation of pulses: radial pulse    NECK  Supple and no bruits heard   MENTAL diffuse cerebral dysfunction. Mri  Brain done on 18th April  showed no evidence of acute disease except senescent changes   Vitamin B-12 and folate levels normal, TSH pending    2. Left subcapital femoral fracture post status surgery  PT/OT  Continue heparin for DVT prophylaxis     3. Hypertension  On Lopressor 25 mg, Resume  losartan as blood pressure elevated and kidney function back to baseline   Can decrease IV  fluids as patient eating and drinking fine    4.   Hx of  dementia  Continue Remeron 30 mg    Started on donepezil 5 mg

## 2019-05-07 NOTE — PROCEDURES
[address] [facility] [place] [address]                          ELECTROENCEPHALOGRAM REPORT    PATIENT NAME: Beto Salazar                        :        1940  MED REC NO:   [id number]                             ROOM:       6453  ACCOUNT NO:   [de-identified]                           ADMIT DATE: 2019  PROVIDER:     Rocky Donnelly    DATE OF EE2019    HISTORY:  This is a 80-year-old male with dementia, presented with  seizure-like activity. MEDICATIONS:  Include mirtazapine, aspirin, insulin. DESCRIPTION OF PROCEDURE:  Electrodes were applied using paste in  positions dictated by the International 10-20 System of placement. Reviewing montages included both referential and bipolar derivations. In addition to EEG data, EKG and eye movements were recorded. This is a  routine recording. This test was performed on 2019. DESCRIPTION OF ACTIVITIES:  At the onset of study, the patient is awake,  and during wakefulness, there are occasional runs of 5-6 Hz, 20-30  microvolt theta activity. The study is also contaminated with muscle  artifacts and movement artifacts and electrode artifacts. As study  progresses, the patient is drowsy with significant attenuation of  artifacts and low-voltage theta activity noted. Electrocardiogram  montage revealed normal sinus rhythm. This study did not demonstrate  any ongoing electrographic seizure activity. No epileptiform discharges  noted. Hyperventilation was not performed. ELECTRODIAGNOSTIC INTERPRETATION:  This EEG performed during wakefulness  and sleep is significantly abnormal with diffuse slowing of background  activity in theta frequencies consistent with underlying diffuse  cortical disease seen in patients with baseline dementia. This study  did not demonstrate any ongoing electrographic seizure activity.    Clinical correlation is recommended.         Fabian Boggs    D: 05/07/2019 16:57:24       T: 05/07/2019 18:01:18     [email or web address]  Job#: [id number]     Doc#: [id number]    CC:

## 2019-05-07 NOTE — CARE COORDINATION
--Spoke with [address] [place] [address] at [facility]. They have precert. Able to accept- today. Report to be called to [phone]  Fax [phone] Pt has telesitter in his room. He will need to be telesitter free for 24 hours for them to accept. 1330 telesitter was removed. HENS completed.

## 2019-05-07 NOTE — PROGRESS NOTES
[facility]    Progress Note    5/7/2019    9:16 AM    Name:   Td Selby MRN:     [id number]     Acct:      [de-identified]   Room:   [address] [place] [address]  IP Day:  9  Admit Date:  4/28/2019  9:26 PM    PCP:   Damir Anaya MD  Code Status:  Full Code    Subjective:     C/C:   Chief Complaint   Patient presents with    Facial Laceration     L upper eyelid s/p fall when he was up and struck bedrail in room. Denies loc    Hip Pain     L hip. unknown if related to fall pta     Interval History Status: not changed. No acute issues overnight  No current complaints  Tolerating PO  Working on [employer] planning      Brief History:     Conchita Sanchez Jr. is a 78 y. o. male with PMH significant for dementia who presented to the emergency department for evaluation of a fall.  Patient was ambulating, stumbled and fell onto a bed rail and then to the ground.  Sustained a laceration by his left eyebrow.  He did not lose consciousness or vomiting after the incident but he does suffer from dementia and seems to have a little bit of confusion.  Complains of left hip pain.  Patient denies any other acute complaints or injuries at this time.  This occurred just prior to arrival at [facility].     He was found to have left hip fracture on imaging. Patient is seen by orthopedics who plans to do surgery for fractured left hip once patient is medically cleared. Underwent left hip hemiarthroplasty 5/2/19   Patient had drop in blood pressure and 60s while in OR suggestive of a causing MAYITO due to ATN  Developed hyperkalemia requiring insulin/dextrose/Kayexalate      Review of Systems:     Unable to assess due to dementia     Medications: Allergies:     Allergies   Allergen Reactions    Lisinopril     Peanut-Containing Drug Products        Current Meds:   Scheduled Meds:    donepezil  5 mg Oral Nightly    heparin (porcine)  5,000 Units Subcutaneous 3 times per day    insulin regular  10 Units Subcutaneous Once    aspirin  81 mg Oral Daily    vitamin D  50,000 Units Oral Weekly    metoprolol tartrate  25 mg Oral BID    docusate sodium  100 mg Oral Nightly    mirtazapine  30 mg Oral Nightly    tamsulosin  0.4 mg Oral Daily    sodium chloride flush  10 mL Intravenous 2 times per day     Continuous Infusions:    dextrose 5 % and 0.9 % NaCl 125 mL/hr at 19 0308    dextrose       PRN Meds: LORazepam, glucose, dextrose, glucagon (rDNA), dextrose, LORazepam, sodium chloride flush, potassium chloride **OR** potassium alternative oral replacement **OR** potassium chloride, magnesium sulfate, magnesium hydroxide, ondansetron, nicotine, acetaminophen, oxyCODONE-acetaminophen **OR** oxyCODONE-acetaminophen, morphine **OR** morphine    Data:     Past Medical History:   has a past medical history of Arthritis, Dementia, Hypertension, and Spinal stenosis. Social History:   reports that he has quit smoking. He has never used smokeless tobacco. He reports that he does not drink alcohol or use drugs. Family History: History reviewed. No pertinent family history. Vitals:  BP (!) 156/76   Pulse 98   Temp 97.5 °F (36.4 °C) (Oral)   Resp 18   Ht 5' 4.96\" (1.65 m)   Wt 147 lb 4.3 oz (66.8 kg)   SpO2 98%   BMI 24.54 kg/m²   Temp (24hrs), Av.5 °F (36.4 °C), Min:97.4 °F (36.3 °C), Max:97.5 °F (36.4 °C)    Recent Labs     19  2311 19  0232 19  1919 19  2305   POCGLU 92 90 96 114*       I/O (24Hr):     Intake/Output Summary (Last 24 hours) at 2019 0916  Last data filed at 2019 0500  Gross per 24 hour   Intake 1500 ml   Output --   Net 1500 ml       Labs:    Hematology:  Recent Labs     19  0528  19  0009 19  0428 19  0624   WBC 5.7  --   --  4.4 3.9   RBC 2.86*  --   --  3.06* 3.02*   HGB 7.8*   < > 8.2* 8.2* 8.2*   HCT 25.4*   < > 27.5* 28.2* 28.4*   MCV 88.8  --   --  92.2 94.0   MCH 27. 3  --   --  26.8 27.2   MCHC 30.7  --   --  29.1 28.9   RDW 18.0*  --   --  18.2* 18.4*     --   --  332 321   MPV 9.8  --   --  9.3 9.1    < > = values in this interval not displayed. Chemistry:  Recent Labs     05/05/19  0528 05/06/19  0428 05/07/19  0624   * 142 144   K 4.5 4.5 4.0   * 113* 114*   CO2 20 18* 22   GLUCOSE 100* 87 82   BUN 33* 26* 23   CREATININE 1.64* 1.34* 1.23*   ANIONGAP 11 11 8*   LABGLOM 41* 51* 57*   GFRAA 49* >60 >60   CALCIUM 8.1* 8.4* 8.5*     Recent Labs     05/05/19  1226 05/05/19  1624 05/05/19  2311 05/06/19  0232 05/06/19  0428 05/06/19  1919 05/06/19  2305 05/07/19  0624   PROT  --   --   --   --  6.4  --   --  6.6   LABALBU  --   --   --   --  2.4*  --   --  2.7*   AST  --   --   --   --  18  --   --  21   ALT  --   --   --   --  <5*  --   --  8   ALKPHOS  --   --   --   --  68  --   --  74   BILITOT  --   --   --   --  0.25*  --   --  0.22*   BILIDIR  --   --   --   --  0.10  --   --   --    POCGLU 88 86 92 90  --  96 114*  --          Lab Results   Component Value Date/Time    SPECIAL L AC 10 12/18/2018 07:47 PM     Lab Results   Component Value Date/Time    CULTURE NO GROWTH 6 DAYS 12/18/2018 07:47 PM       Lab Results   Component Value Date    FIO2 NOT REPORTED 12/18/2018       Physical Examination:        General appearance:  alert, no distress  Mental Status:  Disoriented, baseline dementia   Lungs:  clear to auscultation bilaterally, normal effort  Heart:  regular rate and rhythm  Abdomen:  soft, nontender, nondistended, normal bowel sounds  Extremities:  no edema, redness, tenderness in the calves  Skin:  no gross lesions, rashes, induration    Assessment:        Primary Problem  Closed displaced fracture of left femoral neck [facility])    Active Hospital Problems    Diagnosis Date Noted    [facility] [R56.9]     Cognitive impairment [R41.89]     Fall [W19. XXXA] 04/29/2019    Cardiac arrhythmia [I49.9] 04/29/2019   

## 2019-05-07 NOTE — PROGRESS NOTES
Occupational Therapy  Facility/Department: [address] ORTHO/MED SURG  Daily Treatment Note  NAME: Kwabena Hernández. : 1940  MRN: [id number]    Date of Service: 2019    Discharge Recommendations:    Further therapy recommended at discharge. Assessment   Performance deficits / Impairments: Decreased functional mobility ; Decreased strength;Decreased endurance;Decreased ADL status; Decreased safe awareness;Decreased high-level IADLs;Decreased cognition;Decreased balance  Treatment Diagnosis: fall   Decision Making: High Complexity  REQUIRES OT FOLLOW UP: Yes  Activity Tolerance  Activity Tolerance: Treatment limited secondary to decreased cognition  Safety Devices  Safety Devices in place: Yes  Type of devices: Call light within reach; Chair alarm in place; Patient at risk for falls; Left in chair;Nurse notified       Patient Diagnosis(es): The primary encounter diagnosis was Closed displaced fracture of left femoral neck ([facility] Diagnoses of Stage 3 chronic kidney disease ([address] Dementia associated with other underlying disease without behavioral disturbance, and Facial laceration, initial encounter were also pertinent to this visit. has a past medical history of Arthritis, Dementia, Hypertension, and Spinal stenosis. has a past surgical history that includes Rotator cuff repair (Left); Appendectomy; Colonoscopy; Endoscopy, colon, diagnostic; eye surgery; Partial hip arthroplasty (Left, 2019); and hip pinning (Left, 2019).     Restrictions  Restrictions/Precautions  Restrictions/Precautions: Fall Risk, Weight Bearing, General Precautions  Required Braces or Orthoses?: No  Lower Extremity Weight Bearing Restrictions  Left Lower Extremity Weight Bearing: Weight Bearing As Tolerated  Position Activity Restriction  Other position/activity restrictions: s/p L johny hip , up with assist     Subjective   General  Chart Reviewed: No  Patient assessed for rehabilitation services?: Yes  Family / Caregiver Present: No  Diagnosis: fall  Subjective  Subjective: co-eval with PT  General Comment  Comments: RN ok'd patient for therapy this p.m. Pt agreeable to participate in therapy and pleasant throughout. Patient confused needing heavy verbal and tactile cueing for safety  Vital Signs  Patient Currently in Pain: Denies     Orientation  Orientation  Overall Orientation Status: Impaired  Orientation Level: Oriented to person;Disoriented to time;Disoriented to place; Disoriented to situation    Objective    ADL  LE Dressing: Maximum assistance  Toileting: Maximum assistance  Additional Comments: Patient in chair upon arrival. patient needing extra time to complete functional mobility due to impaired safety awareness and needing tactile and verbal cues to safely navigate walker. Patient completed tolieting at [facility] for safety with transfer tasks, changing and donning new brief and completing personal hygiene after BM. Patient completed functional mobility to EOB requesting to return to chair with increased time to complete functional mobility with tactile assistance for walker safety at [facility]        Balance  Sitting Balance: Stand by assistance  Standing Balance: Minimal assistance  Standing Balance  Time: ~4 minutes  Activity: completing standing to complete personal hygiene and standing   Functional Mobility  Functional - Mobility Device: Standard Walker  Activity: To/from bathroom  Assist Level: Minimal assistance  Bed mobility  Comment: Patient in supine upon arrival and retired to the chair  Transfers  Sit to stand: Minimal assistance  Stand to sit: Minimal assistance  Transfer Comments: Patient needing assistance for safety and to reduce fall risk. Cognition  Overall Cognitive Status: Exceptions  Arousal/Alertness: Delayed responses to stimuli  Following Commands: Follows one step commands with repetition; Follows one step commands with increased time  Attention Span: Attends with cues to

## 2019-05-08 VITALS
WEIGHT: 147.27 LBS | HEIGHT: 65 IN | TEMPERATURE: 98.2 F | RESPIRATION RATE: 16 BRPM | DIASTOLIC BLOOD PRESSURE: 73 MMHG | HEART RATE: 78 BPM | SYSTOLIC BLOOD PRESSURE: 129 MMHG | OXYGEN SATURATION: 98 % | BODY MASS INDEX: 24.54 KG/M2

## 2019-05-08 LAB
ANION GAP SERPL CALCULATED.3IONS-SCNC: 9 MMOL/L (ref 9–17)
BUN BLDV-MCNC: 23 MG/DL (ref 8–23)
BUN/CREAT BLD: ABNORMAL (ref 9–20)
CALCIUM SERPL-MCNC: 8.1 MG/DL (ref 8.6–10.4)
CHLORIDE BLD-SCNC: 118 MMOL/L (ref 98–107)
CO2: 21 MMOL/L (ref 20–31)
CREAT SERPL-MCNC: 1.2 MG/DL (ref 0.7–1.2)
GFR AFRICAN AMERICAN: >60 ML/MIN
GFR NON-AFRICAN AMERICAN: 58 ML/MIN
GFR SERPL CREATININE-BSD FRML MDRD: ABNORMAL ML/MIN/{1.73_M2}
GFR SERPL CREATININE-BSD FRML MDRD: ABNORMAL ML/MIN/{1.73_M2}
GLUCOSE BLD-MCNC: 77 MG/DL (ref 70–99)
HCT VFR BLD CALC: 23.8 % (ref 40.7–50.3)
HEMOGLOBIN: 7.3 G/DL (ref 13–17)
MCH RBC QN AUTO: 27.9 PG (ref 25.2–33.5)
MCHC RBC AUTO-ENTMCNC: 30.7 G/DL (ref 28.4–34.8)
MCV RBC AUTO: 90.8 FL (ref 82.6–102.9)
NRBC AUTOMATED: 0 PER 100 WBC
PDW BLD-RTO: 18.3 % (ref 11.8–14.4)
PLATELET # BLD: 327 K/UL (ref 138–453)
PMV BLD AUTO: 9.7 FL (ref 8.1–13.5)
POTASSIUM SERPL-SCNC: 4.3 MMOL/L (ref 3.7–5.3)
RBC # BLD: 2.62 M/UL (ref 4.21–5.77)
SODIUM BLD-SCNC: 148 MMOL/L (ref 135–144)
WBC # BLD: 3.2 K/UL (ref 3.5–11.3)

## 2019-05-08 PROCEDURE — 6370000000 HC RX 637 (ALT 250 FOR IP): Performed by: STUDENT IN AN ORGANIZED HEALTH CARE EDUCATION/TRAINING PROGRAM

## 2019-05-08 PROCEDURE — 97530 THERAPEUTIC ACTIVITIES: CPT

## 2019-05-08 PROCEDURE — 36415 COLL VENOUS BLD VENIPUNCTURE: CPT

## 2019-05-08 PROCEDURE — 2580000003 HC RX 258: Performed by: STUDENT IN AN ORGANIZED HEALTH CARE EDUCATION/TRAINING PROGRAM

## 2019-05-08 PROCEDURE — 6370000000 HC RX 637 (ALT 250 FOR IP): Performed by: NURSE PRACTITIONER

## 2019-05-08 PROCEDURE — 97535 SELF CARE MNGMENT TRAINING: CPT

## 2019-05-08 PROCEDURE — 97116 GAIT TRAINING THERAPY: CPT

## 2019-05-08 PROCEDURE — 85027 COMPLETE CBC AUTOMATED: CPT

## 2019-05-08 PROCEDURE — 99232 SBSQ HOSP IP/OBS MODERATE 35: CPT | Performed by: PSYCHIATRY & NEUROLOGY

## 2019-05-08 PROCEDURE — 6360000002 HC RX W HCPCS: Performed by: INTERNAL MEDICINE

## 2019-05-08 PROCEDURE — 99239 HOSP IP/OBS DSCHRG MGMT >30: CPT | Performed by: INTERNAL MEDICINE

## 2019-05-08 PROCEDURE — 80048 BASIC METABOLIC PNL TOTAL CA: CPT

## 2019-05-08 RX ADMIN — HEPARIN SODIUM 5000 UNITS: 5000 INJECTION INTRAVENOUS; SUBCUTANEOUS at 14:29

## 2019-05-08 RX ADMIN — TAMSULOSIN HYDROCHLORIDE 0.4 MG: 0.4 CAPSULE ORAL at 09:02

## 2019-05-08 RX ADMIN — METOPROLOL TARTRATE 25 MG: 25 TABLET ORAL at 09:02

## 2019-05-08 RX ADMIN — HEPARIN SODIUM 5000 UNITS: 5000 INJECTION INTRAVENOUS; SUBCUTANEOUS at 06:44

## 2019-05-08 RX ADMIN — Medication 10 ML: at 09:02

## 2019-05-08 RX ADMIN — ASPIRIN 81 MG: 81 TABLET, CHEWABLE ORAL at 09:02

## 2019-05-08 ASSESSMENT — PAIN SCALES - GENERAL: PAINLEVEL_OUTOF10: 0

## 2019-05-08 NOTE — PROGRESS NOTES
Occupational Therapy  Facility/Department: [address] ORTHO/MED SURG  Daily Treatment Note  NAME: Rosalinda Grove. : 1940  MRN: [id number]    Date of Service: 2019    Discharge Recommendations:    Further therapy recommended at discharge. Assessment   Performance deficits / Impairments: Decreased functional mobility ; Decreased strength;Decreased endurance;Decreased ADL status; Decreased safe awareness;Decreased high-level IADLs;Decreased cognition;Decreased balance  Treatment Diagnosis: fall   Prognosis: Good  Patient Education: OT POC, transfer/walker safety- poor return d/t cognition. REQUIRES OT FOLLOW UP: Yes  Activity Tolerance  Activity Tolerance: Patient Tolerated treatment well;Patient limited by fatigue;Treatment limited secondary to decreased cognition  Safety Devices  Safety Devices in place: Yes  Type of devices: All fall risk precautions in place; Left in chair;Call light within reach;Nurse notified; Chair alarm in place;Gait belt  Restraints  Initially in place: No         Patient Diagnosis(es): The primary encounter diagnosis was Closed displaced fracture of left femoral neck ([facility] Diagnoses of Stage 3 chronic kidney disease ([address] Dementia associated with other underlying disease without behavioral disturbance, and Facial laceration, initial encounter were also pertinent to this visit. has a past medical history of Arthritis, Dementia, Hypertension, and Spinal stenosis. has a past surgical history that includes Rotator cuff repair (Left); Appendectomy; Colonoscopy; Endoscopy, colon, diagnostic; eye surgery; Partial hip arthroplasty (Left, 2019); and hip pinning (Left, 2019).     Restrictions  Restrictions/Precautions  Restrictions/Precautions: Fall Risk, Weight Bearing, General Precautions  Required Braces or Orthoses?: No  Lower Extremity Weight Bearing Restrictions  Left Lower Extremity Weight Bearing: Weight Bearing As Tolerated  Position Activity Restriction  Other position/activity restrictions: s/p L johny hip 5/2, up with assist     Subjective   General  Chart Reviewed: No  Patient assessed for rehabilitation services?: Yes  Family / Caregiver Present: No  Diagnosis: fall  Subjective  Subjective: co tx with PT for part of session  General Comment  Comments: RN ok'd patient for therapy this a.m. Vital Signs  Patient Currently in Pain: Denies     Orientation  Orientation  Overall Orientation Status: Impaired  Orientation Level: Oriented to place;Oriented to person;Disoriented to time;Disoriented to situation    Objective    ADL  Grooming: Minimal assistance;Verbal cueing;Setup; Increased time to complete(assist w/ putting toothpaste on toothbrush)  UE Bathing: Setup;Verbal cueing; Increased time to complete;Minimal assistance(assist w/ washing back )  UE Dressing: Setup; Moderate assistance(to manage gown )  Additional Comments: Pt in bed w/ HOB raised on arrival. Pt transferred EOB<>chair and then completed sit<>stand to particiapte in [facility] mob to hallway w/ no LOB. Pt returned to chair to completed grooming and self-care task w/ sx soap and tray setup. Pt declined washing LE d/t being fatigued. Pt required verbal and tactile cues throughout session for safety w/ transfers and initiation of self-care tasks. Pt remained in recliner w/ LE elevated w/ abductor pillow in place. Call light within reach, RN notified. Balance  Sitting Balance: Stand by assistance  Standing Balance: Minimal assistance  Functional Mobility  Functional - Mobility Device: Platform walker  Activity: Other(around unit)  Assist Level: Contact guard assistance  Functional Mobility Comments: ~15 mins w/ 1 restbreak  Bed mobility  Supine to Sit: Moderate assistance;2 Person assistance  Scooting: Moderate assistance  Transfers  Sit Pivot Transfers: Maximum assistance  Sit to stand:  Moderate assistance  Stand to sit: Moderate assistance  Transfer Comments: Patient needing assistance for safety and to reduce fall risk. Cognition  Overall Cognitive Status: Exceptions  Arousal/Alertness: Delayed responses to stimuli  Following Commands: Follows one step commands with repetition; Follows one step commands with increased time  Attention Span: Attends with cues to redirect  Memory: Decreased recall of recent events;Decreased short term memory;Decreased recall of biographical Information  Safety Judgement: Decreased awareness of need for assistance;Decreased awareness of need for safety  Problem Solving: Assistance required to correct errors made;Assistance required to identify errors made  Insights: Not aware of deficits  Initiation: Requires cues for all  Sequencing: Requires cues for all  Cognition Comment: Pt with baseline dementia.         Plan   Plan  Times per week: 4-5x/wk  Current Treatment Recommendations: Strengthening, Endurance Training, Patient/Caregiver Education & Training, Self-Care / ADL, Safety Education & Training    Goals  Short term goals  Time Frame for Short term goals: pt will, by discharge  Short term goal 1: maintain attention to task for ~8 minutes with min verbal cues for redirection  Short term goal 2: follow 80% of commands during session with min verbal cues  Short term goal 3: dem mod A during functional transfers/functional mobility with LRD  Short term goal 4: dem ~5 minutes static standing tolerance with LRD and mod A in order to increase ability to complete ADLs  Short term goal 5: complete LB ADLs with mod A ,set up and AE, as needed  Short term goal 6: complete UB ADLs and grooming tasks with min A, set up and min verbal cues for initiation        Therapy Time   Individual Concurrent Group Co-treatment   Time In  830         Time Out  [address]

## 2019-05-08 NOTE — PROGRESS NOTES
[facility]    Progress Note    5/8/2019    9:13 AM    Name:   Ashley Edmondson. MRN:     [id number]     Acct:      [de-identified]   Room:   [address] [place] [address] Day:  10  Admit Date:  4/28/2019  9:26 PM    PCP:   Carrol Silva MD  Code Status:  Full Code    Subjective:     C/C:   Chief Complaint   Patient presents with    Facial Laceration     L upper eyelid s/p fall when he was up and struck bedrail in room. Denies loc    Hip Pain     L hip. unknown if related to fall pta     Interval History Status: not changed. No acute issues overnight  No current complaints  DC planning in progress  Needs off tele sitter x 24 hours    Brief History:     Sarika Vaca Jr. is a 78 y. o. male with PMH significant for dementia who presented to the emergency department for evaluation of a fall.  Patient was ambulating, stumbled and fell onto a bed rail and then to the ground.  Sustained a laceration by his left eyebrow.  He did not lose consciousness or vomiting after the incident but he does suffer from dementia and seems to have a little bit of confusion.  Complains of left hip pain.  Patient denies any other acute complaints or injuries at this time.  This occurred just prior to arrival at [facility].     He was found to have left hip fracture on imaging. Patient is seen by orthopedics who plans to do surgery for fractured left hip once patient is medically cleared.      Underwent left hip hemiarthroplasty 5/2/19   Patient had drop in blood pressure and 60s while in OR suggestive of a causing MAYITO due to ATN  Developed hyperkalemia requiring insulin/dextrose/Kayexalate      Review of Systems:     General ROS: negative for - chills or fever  Respiratory ROS: no cough, shortness of breath, or wheezing  Cardiovascular ROS: no chest pain or dyspnea on exertion  Gastrointestinal ROS: no abdominal pain, change in bowel habits, or black or bloody stools  Genito-Urinary ROS: no dysuria, trouble voiding, or hematuria  Musculoskeletal ROS: negative for - joint pain  Neurological ROS: negative for - seizures      Medications: Allergies: Allergies   Allergen Reactions    Lisinopril     Peanut-Containing Drug Products        Current Meds:   Scheduled Meds:    donepezil  5 mg Oral Nightly    heparin (porcine)  5,000 Units Subcutaneous 3 times per day    insulin regular  10 Units Subcutaneous Once    aspirin  81 mg Oral Daily    vitamin D  50,000 Units Oral Weekly    metoprolol tartrate  25 mg Oral BID    docusate sodium  100 mg Oral Nightly    mirtazapine  30 mg Oral Nightly    tamsulosin  0.4 mg Oral Daily    sodium chloride flush  10 mL Intravenous 2 times per day     Continuous Infusions:    dextrose       PRN Meds: LORazepam, glucose, dextrose, glucagon (rDNA), dextrose, LORazepam, sodium chloride flush, potassium chloride **OR** potassium alternative oral replacement **OR** potassium chloride, magnesium sulfate, magnesium hydroxide, ondansetron, nicotine, acetaminophen, oxyCODONE-acetaminophen **OR** oxyCODONE-acetaminophen, morphine **OR** morphine    Data:     Past Medical History:   has a past medical history of Arthritis, Dementia, Hypertension, and Spinal stenosis. Social History:   reports that he has quit smoking. He has never used smokeless tobacco. He reports that he does not drink alcohol or use drugs. Family History: History reviewed. No pertinent family history. Vitals:  /73   Pulse 78   Temp 98.2 °F (36.8 °C) (Oral)   Resp 16   Ht 5' 4.96\" (1.65 m)   Wt 147 lb 4.3 oz (66.8 kg)   SpO2 98%   BMI 24.54 kg/m²   Temp (24hrs), Av.3 °F (36.8 °C), Min:98.2 °F (36.8 °C), Max:98.4 °F (36.9 °C)    Recent Labs     19  2311 19  0232 19  1919 19  2305   POCGLU 92 90 96 114*       I/O (24Hr):     Intake/Output Summary (Last 24 hours) at 2019 0913  Last data filed at 2019 2000  Gross per 24 hour   Intake 1900 ml   Output --   Net 1900 ml       Labs:    Hematology:  Recent Labs     05/06/19 0428 05/07/19 0624 05/08/19  0440   WBC 4.4 3.9 3.2*   RBC 3.06* 3.02* 2.62*   HGB 8.2* 8.2* 7.3*   HCT 28.2* 28.4* 23.8*   MCV 92.2 94.0 90.8   MCH 26.8 27.2 27.9   MCHC 29.1 28.9 30.7   RDW 18.2* 18.4* 18.3*    321 327   MPV 9.3 9.1 9.7     Chemistry:  Recent Labs     05/06/19 0428 05/07/19 0624 05/08/19 0440    144 148*   K 4.5 4.0 4.3   * 114* 118*   CO2 18* 22 21   GLUCOSE 87 82 77   BUN 26* 23 23   CREATININE 1.34* 1.23* 1.20   ANIONGAP 11 8* 9   LABGLOM 51* 57* 58*   GFRAA >60 >60 >60   CALCIUM 8.4* 8.5* 8.1*     Recent Labs     05/05/19  1226 05/05/19  1624 05/05/19  2311 05/06/19  0232 05/06/19 0428 05/06/19  1919 05/06/19  2305 05/07/19  0624   PROT  --   --   --   --  6.4  --   --  6.6   LABALBU  --   --   --   --  2.4*  --   --  2.7*   AST  --   --   --   --  18  --   --  21   ALT  --   --   --   --  <5*  --   --  8   ALKPHOS  --   --   --   --  68  --   --  74   BILITOT  --   --   --   --  0.25*  --   --  0.22*   BILIDIR  --   --   --   --  0.10  --   --   --    POCGLU 88 86 92 90  --  96 114*  --          Lab Results   Component Value Date/Time    SPECIAL L AC 10 12/18/2018 07:47 PM     Lab Results   Component Value Date/Time    CULTURE NO GROWTH 6 DAYS 12/18/2018 07:47 PM       Lab Results   Component Value Date    FIO2 NOT REPORTED 12/18/2018       Physical Examination:        General appearance:  alert, no distress  Mental Status:  Disoriented, baseline dementia   Lungs:  clear to auscultation bilaterally, normal effort  Heart:  regular rate and rhythm  Abdomen:  soft, nontender, nondistended, normal bowel sounds  Extremities:  no edema, redness, tenderness in the calves  Skin:  no gross lesions, rashes, induration    Assessment:        Primary Problem  Closed displaced fracture of left femoral neck [facility])    Active Hospital Problems    Diagnosis Date Noted  Witnessed seizure-like activity ([facility] [R56.9]     Cognitive impairment [R41.89]     Fall [W19. XXXA] 04/29/2019    Cardiac arrhythmia [I49.9] 04/29/2019    Hypernatremia [E87.0] 04/29/2019    Closed displaced fracture of left femoral neck ([facility] [S72.002A] 04/28/2019    Severe malnutrition ([facility] [E43] 12/21/2018    Anemia due to stage 3 chronic kidney disease ([facility] [N18.3, D63.1] 12/19/2018    Dementia [F03.90] 12/19/2018    Essential hypertension [I10] 12/19/2018    Stage 3 chronic kidney disease ([facility] [N18.3] 12/19/2018    MAYITO (acute kidney injury) ([facility] [W03.1]     Metabolic encephalopathy [V84.78]        Plan:        - Labs and imaging reviewed  - Neurology consulted - EEG consistent with dementia   - Continue current medications  - Orthopedic surgery following - s/p L hip hemiarthroplasty  - Medications reviewed  - Continue PT/OT  - SW/CM for dc planning - working on placement  - Needs to be off tele sitter x 24 hours  - DC daily labs  - DC later today        Beverley White MD  5/8/2019  9:13 AM

## 2019-05-08 NOTE — PROGRESS NOTES
NEUROLOGY INPATIENT PROGRESS NOTE    5/8/2019         Subjective: Olive Bernstein. is a  78 y.o. male admitted on4/28/2019 with Closed fracture of neck of left femur ([facility] [S72.002A]  Closed fracture of neck of femur, unspecified laterality, initial encounter ([facility] Abbey Garcia      Briefly, this is a  78 y.o. male admitted on 4/28/2019 with significant past medical history of  dementia , hypertension, stage III chronic kidney disease, BPH who was admitted on the 28th for evaluation of her fall. From the charts it appears that patient has been staying in a nursing home and he stumbled and fell onto her bedrail and then on the ground. Patient had a laceration on his left eyebrow. He did not lose consciousness after the fall. Was  found to have left hip fracture- subcapital femoral fracture on imaging and had a surgery done on 2nd may.     Patient has baseline dementia and a poor historian. At times he is able to give answers. Neurology was consulted because yesterday during night while changing these patient had an episode of stiffening as well as medial to lateral  movement of the eyes. Patient had an episode of seizure-like activity but this lasted for a few seconds and he returned back to baseline.     Cardiology was consulted for preoperative cardiovascular risk stratification. From chart it appears that patient does not have any known history of seizures now, not in any antiseizure medication at home  Ct head 4/28-unremarkable for any acute abnormality  EEG 5/6 showed slowing background waves no AED recommended at this time  Bit b-!2 985  Folate-7.9  TSH-pending    Patient is seen and evalauted at bedside, no acute overnight event. No current facility-administered medications on file prior to encounter.       Current Outpatient Medications on File Prior to Encounter   Medication Sig Dispense Refill    acetaminophen (TYLENOL) 325 MG tablet Take 650 mg by mouth every 4 hours as needed for Pain  aspirin 81 MG tablet Take 81 mg by mouth daily      levofloxacin (LEVAQUIN) 500 MG tablet Take 500 mg by mouth daily      Ergocalciferol (VITAMIN D2 PO) Take by mouth      docusate sodium (COLACE) 100 MG capsule Take 100 mg by mouth nightly      tamsulosin (FLOMAX) 0.4 MG capsule Take 0.4 mg by mouth daily      mirtazapine (REMERON) 30 MG tablet Take 30 mg by mouth nightly      vitamin E 400 UNIT capsule Take 400 Units by mouth daily         Allergies: Bryn Walton. is allergic to lisinopril and peanut-containing drug products.     Past Medical History:   Diagnosis Date    Arthritis     Dementia     Hypertension     Spinal stenosis        Past Surgical History:   Procedure Laterality Date    APPENDECTOMY      COLONOSCOPY      ENDOSCOPY, COLON, DIAGNOSTIC      EYE SURGERY      HIP PINNING Left 2019    LEFT HIP ERIN-ARTHROPLASTY performed by Daisy Lane MD at [place] [address] Left 2019     LEFT HIP ERIN-ARTHROPLASTY     ROTATOR CUFF REPAIR Left     X2           Medications:     donepezil  5 mg Oral Nightly    heparin (porcine)  5,000 Units Subcutaneous 3 times per day    insulin regular  10 Units Subcutaneous Once    aspirin  81 mg Oral Daily    vitamin D  50,000 Units Oral Weekly    metoprolol tartrate  25 mg Oral BID    docusate sodium  100 mg Oral Nightly    mirtazapine  30 mg Oral Nightly    tamsulosin  0.4 mg Oral Daily    sodium chloride flush  10 mL Intravenous 2 times per day     PRN Meds include: LORazepam, glucose, dextrose, glucagon (rDNA), dextrose, LORazepam, sodium chloride flush, potassium chloride **OR** potassium alternative oral replacement **OR** potassium chloride, magnesium sulfate, magnesium hydroxide, ondansetron, nicotine, acetaminophen, oxyCODONE-acetaminophen **OR** oxyCODONE-acetaminophen, morphine **OR** morphine    Objective:   /73   Pulse 78   Temp 98.2 °F (36.8 °C) (Oral)   Resp 16   Ht 5' 4.96\" (1.65 m) Wt 147 lb 4.3 oz (66.8 kg)   SpO2 98%   BMI 24.54 kg/m²     Blood pressure range: Systolic (25WIR), IOP:398 , Min:129 , KCR:823   ; Diastolic (59VEI), YCZ:96, Min:59, Max:99      Review of Systems      NEUROLOGIC EXAMINATION  GENERAL  Baseline confusion as  history of dementia    HEENT  Left eyebrow laceration    HEART  S1 and S2 heard; palpation of pulses: radial pulse    NECK  Supple and no bruits heard   MENTAL STATUS: Baseline confusion    CRANIAL NERVES: II     -      Visual fields intact to confrontation  III,IV,VI -  PERR, EOMs full, no ptosis  V     -     Normal facial sensation   VII    -     Normal facial symmetry  VIII   -     Intact hearing   IX,X -     Symmetrical palate  XI    -     Symmetrical shoulder shrug  XII   -     Midline tongue, no atrophy    MOTOR FUNCTION: RUE: Significant for good strength of grade 5/5 in proximal and distal muscle groups   LUE: Significant for good strength of grade 5/5 in proximal and distal muscle groups   RLE: Significant for good strength of grade 5/5 in proximal and distal muscle groups   LLE: Post status surgery    SENSORY FUNCTION:  Normal touch, normal pin, normal vibration, normal proprioception   CEREBELLAR FUNCTION:  Intact fine motor control over upper limbs and lower limbs   REFLEX FUNCTION:  Symmetric in upper and lower extremities, no Babinski sign   STATION and GAIT Not tested              Data:    Lab Results:   CBC:   Recent Labs     05/06/19  0428 05/07/19  0624 05/08/19  0440   WBC 4.4 3.9 3.2*   HGB 8.2* 8.2* 7.3*    321 327     BMP:    Recent Labs     05/06/19  0428 05/07/19  0624 05/08/19  0440    144 148*   K 4.5 4.0 4.3   * 114* 118*   CO2 18* 22 21   BUN 26* 23 23   CREATININE 1.34* 1.23* 1.20   GLUCOSE 87 82 77         Lab Results   Component Value Date    ALT 8 05/07/2019    AST 21 05/07/2019    INR 0.9 04/29/2019    [id number] 985 05/06/2019       No results found for: PHENYTOIN, PHENYTOIN, PHENOBARB, VALPROATE, [facility]          ASSESSMENT/PLAN    1.  Seizure-like activity  BMP  Normal- no lytes abnormalities  EEG 5/7shows diffuse background slowing no AED recommended at this time  Mri  Brain done on 18th April  showed no evidence of acute disease except senescent changes  Vitamin B-12 and folate levels normal, TSH pending     2.  Left subcapital femoral fracture post status surgery  PT/OT  Continue heparin for DVT prophylaxis     3. Hypertension  On Lopressor 25 mg, Resume  losartan as blood pressure elevated and kidney function back to baseline   Can decrease IV  fluids as patient eating and drinking fine     4.  Hx of  dementia  Continue Remeron 30 mg    Started on donepezil 5 mg    Patient is waiting placement as per primary team will c/w to follow.       Karthik Medina MD  Neurology Resident PGY-2  5/8/2019 at 9:03 AM

## 2019-05-08 NOTE — CARE COORDINATION
Transitional planning. Spoke to Sofar Sounds with [facility]. She will talk to liaison about on-site visit that was supposed to be completed yesterday d/t pt on telesitter. Writer informed her that at 1:30pm pt will have been off telesitter for 24 hours. [address] called to inform CM that pt is good to go after 1:30pm today. She will call if she can not retreive all needed information in Epic/Carelink).    Report to [phone] going to room 102   Fax [phone] None

## 2019-05-08 NOTE — DISCHARGE SUMMARY
[facility]     Discharge Summary     Patient ID: Emily Webber :  1940   MRN: [id number]     ACCOUNT:  [de-identified]   Patient's PCP: Stephanie Castellanos MD  Admit Date: 2019   Discharge Date: 2019  Length of Stay: 10  Code Status:  Full Code  Admitting Physician: Omid Khan MD  Discharge Physician: Bonny Brown MD     Active Discharge Diagnoses:     Hospital Problem Lists:  Principal Problem:    Closed displaced fracture of left femoral neck ([address]  Active Problems:    Metabolic encephalopathy    Stage 3 chronic kidney disease ([address]    Anemia due to stage 3 chronic kidney disease ([address]    MAYITO (acute kidney injury) ([facility]    Dementia    Essential hypertension    Severe malnutrition ([facility]    Fall    Cardiac arrhythmia    Hypernatremia    Witnessed seizure-like activity ([facility]    Cognitive impairment  Resolved Problems:    * No resolved hospital problems. *      Admission Condition:  fair     Discharged Condition: stable    Hospital Stay:     Hospital Course:   Mary Alan a 78 y. o. male with PMH significant for dementia who presented to the emergency department for evaluation of a fall.  Patient was ambulating, stumbled and fell onto a bed rail and then to the ground.  Sustained a laceration by his left eyebrow.  He did not lose consciousness.  Was complaining of left hip pain. In ER he was found to have left hip fracture on imaging. Orthopedic surgery consulted and patient underwent left hip hemiarthroplasty on 19. Patient had drop in blood pressure in OR and had MAYITO with hyperkalemia post op requiring insulin/dextrose/Kayexalate which resolved. On floor also had episode of seizure like activity. EEG demonstrating encephalopathy consistent with dementia, no seizure activity. Dc to SNF.       Significant therapeutic interventions:   - Neurology consulted - EEG consistent with dementia   - Continue current medications  - Orthopedic surgery following - s/p L hip hemiarthroplasty (5/2)  - Medications reviewed  - Continue PT/OT  - SW/CM for dc planning - working on placement        Significant Diagnostic Studies:   Labs / Micro:  CBC:   Lab Results   Component Value Date    WBC 3.2 05/08/2019    RBC 2.62 05/08/2019    HGB 7.3 05/08/2019    HCT 23.8 05/08/2019    MCV 90.8 05/08/2019    MCH 27.9 05/08/2019    MCHC 30.7 05/08/2019    RDW 18.3 05/08/2019     05/08/2019     BMP:    Lab Results   Component Value Date    GLUCOSE 77 05/08/2019     05/08/2019    K 4.3 05/08/2019     05/08/2019    CO2 21 05/08/2019    ANIONGAP 9 05/08/2019    BUN 23 05/08/2019    CREATININE 1.20 05/08/2019    BUNCRER NOT REPORTED 05/08/2019    CALCIUM 8.1 05/08/2019    LABGLOM 58 05/08/2019    GFRAA >60 05/08/2019    GFR      05/08/2019    GFR NOT REPORTED 05/08/2019     HFP:    Lab Results   Component Value Date    PROT 6.6 05/07/2019     CMP:    Lab Results   Component Value Date    GLUCOSE 77 05/08/2019     05/08/2019    K 4.3 05/08/2019     05/08/2019    CO2 21 05/08/2019    BUN 23 05/08/2019    CREATININE 1.20 05/08/2019    ANIONGAP 9 05/08/2019    ALKPHOS 74 05/07/2019    ALT 8 05/07/2019    AST 21 05/07/2019    BILITOT 0.22 05/07/2019    LABALBU 2.7 05/07/2019    ALBUMIN 0.7 05/07/2019    LABGLOM 58 05/08/2019    GFRAA >60 05/08/2019    GFR      05/08/2019    GFR NOT REPORTED 05/08/2019    PROT 6.6 05/07/2019    CALCIUM 8.1 05/08/2019     PT/INR:  No results found for: PTINR  PTT:   Lab Results   Component Value Date    APTT 30.3 12/18/2018     FLP:  No results found for: CHOL, TRIG, HDL  U/A:    Lab Results   Component Value Date    COLORU YELLOW 12/21/2018    TURBIDITY CLEAR 12/21/2018    SPECGRAV 1.011 12/21/2018    HGBUR MODERATE 12/21/2018    PHUR 5.0 12/21/2018    PROTEINU 1+ 12/21/2018    GLUCOSEU NEGATIVE 12/21/2018    KETUA NEGATIVE 12/21/2018    BILIRUBINUR NEGATIVE 12/21/2018    UROBILINOGEN Normal 12/21/2018    NITRU NEGATIVE 12/21/2018    LEUKOCYTESUR NEGATIVE 12/21/2018       Radiology:    Xr Hip Left (1 View)    Result Date: 5/2/2019  EXAMINATION: SINGLE XRAY VIEW OF THE LEFT HIP 5/2/2019 11:27 am COMPARISON: None. HISTORY: ORDERING SYSTEM PROVIDED HISTORY: x-table in OR TECHNOLOGIST PROVIDED HISTORY: x-table in OR Ordering Physician Provided Reason for Exam: x-table in OR FINDINGS: Left hip hemiarthroplasty with expected postsurgical changes. Soft tissue swelling and soft tissue air. No periprosthetic fracture. Expected postsurgical changes left hip. Ct Head Wo Contrast    Result Date: 4/28/2019  EXAMINATION: CT OF THE HEAD WITHOUT CONTRAST  4/28/2019 10:02 pm TECHNIQUE: CT of the head was performed without the administration of intravenous contrast. Dose modulation, iterative reconstruction, and/or weight based adjustment of the mA/kV was utilized to reduce the radiation dose to as low as reasonably achievable. COMPARISON: None. HISTORY: ORDERING SYSTEM PROVIDED HISTORY: fall. trauma TECHNOLOGIST PROVIDED HISTORY: Ordering Physician Provided Reason for Exam: fall Acuity: Acute Type of Exam: Initial FINDINGS: BRAIN/VENTRICLES: Generalized involutional changes of the brain from identified with prominence of ventricles sulci. There is no acute intracranial hemorrhage, mass effect or midline shift. No abnormal extra-axial fluid collection. The gray-white differentiation is maintained without evidence of an acute infarct. There is no evidence of hydrocephalus. Moderate periventricular subcortical white matter hypoattenuation suggestive chronic small vessel ischemic disease. Intracranial vascular calcifications. ORBITS: The visualized portion of the orbits demonstrate no acute abnormality. SINUSES: Mild ethmoid sinus mucosal thickening. Mucous retention cyst versus polyp identified involving the right sphenoid sinus.  SOFT TISSUES/SKULL:  No acute abnormality of the visualized skull or soft tissues. No acute intracranial abnormality. Moderate ethmoid sinus mucosal thickening. Us Renal Complete    Result Date: 4/30/2019  EXAMINATION: RETROPERITONEAL ULTRASOUND OF THE KIDNEYS AND URINARY BLADDER 4/30/2019 COMPARISON: None HISTORY: ORDERING SYSTEM PROVIDED HISTORY: RENAL FAILURE, ACUTE (KIDNEY INJURY) TECHNOLOGIST PROVIDED HISTORY: MAYITO FINDINGS: Study is limited by patient body habitus, bowel gas, inability to move, and rib artifact. Kidneys: The right kidney measures 10.1 x 4.4 x 5.8 cm and the left kidney measures 9.6 x 4.3 x 4.8 cm. Cortical thickness is 1.5 cm on the right and 2.2 cm on the left. Prominent left renal pelvis. Kidneys demonstrate normal cortical echogenicity. No evidence of hydronephrosis or intrarenal stones. Bladder: Tech reports Reid catheter present, but not seen intraluminally; may be in the prostatic urethra. Pre voiding urinary bladder volume 228.3 mL; prostate impinges somewhat on the bladder floor and is prominent measuring 4.1 x 3.3 x 3.1 cm. No post void residual was measured on this study. Limited study technically. No hydronephrosis or ultrasound evidence medical renal disease. Fullness left renal pelvis. Prominent prostate; Reid catheter reported, as above. Ct Femur Left Wo Contrast    Result Date: 4/29/2019  EXAMINATION: CT OF THE LEFT FEMUR WITHOUT CONTRAST 4/28/2019 10:51 pm TECHNIQUE: CT of the left femur was performed without the administration of intravenous contrast.  Multiplanar reformatted images are provided for review. Dose modulation, iterative reconstruction, and/or weight based adjustment of the mA/kV was utilized to reduce the radiation dose to as low as reasonably achievable.  COMPARISON: Radiographs 4/28/2019 HISTORY ORDERING SYSTEM PROVIDED HISTORY: questionable proximal fracture on xray TECHNOLOGIST PROVIDED HISTORY: Ordering Physician Provided Reason for Exam: fall Acuity: Acute Type of Exam: Initial FINDINGS: Bones: Acute left was seen and examined on day of discharge and this discharge summary is in conjunction with any daily progress note from day of discharge.     Discharge plan:     Disposition: Skilled Facility    Physician Follow Up:     Ash Gong MD  [address] [place] [address]  [phone]          CHRISTIANO Melvin CNP  [address] [facility] [address]  [facility] [address]  [phone]      Friday, May 17 at 10:30 at [facility]. office    Noelle Reynaga, [address] [place] [address]  [phone]    In 5 weeks      Armand Joya [facility] [address] [facility]  [phone]    In 2 weeks         Requiring Further Evaluation/Follow Up POST HOSPITALIZATION/Incidental Findings:     Diet: regular diet    Activity: As tolerated    Instructions to Patient: follow up with surgery     Discharge Medications:      Medication List      START taking these medications    donepezil 5 MG tablet  Commonly known as:  ARICEPT  Take 1 tablet by mouth nightly     heparin (porcine) 5000 UNIT/ML injection  Inject 1 mL into the skin every 8 hours for 23 days     metoprolol tartrate 25 MG tablet  Commonly known as:  LOPRESSOR  Take 1 tablet by mouth 2 times daily        CONTINUE taking these medications    acetaminophen 325 MG tablet  Commonly known as:  TYLENOL     aspirin 81 MG tablet     docusate sodium 100 MG capsule  Commonly known as:  COLACE     mirtazapine 30 MG tablet  Commonly known as:  REMERON     tamsulosin 0.4 MG capsule  Commonly known as:  FLOMAX     VITAMIN D2 PO     vitamin E 400 UNIT capsule        STOP taking these medications    hydrochlorothiazide 25 MG tablet  Commonly known as:  HYDRODIURIL     losartan 100 MG tablet  Commonly known as:  COZAAR        ASK your doctor about these medications    levofloxacin 500 MG tablet  Commonly known as:  LEVAQUIN           Where to Get Your Medications      These medications were sent to [facility] [address] [place] [address] - [address] [place] [address]    Phone:  [phone]   · donepezil 5 MG tablet  · heparin (porcine) 5000 UNIT/ML injection  · metoprolol tartrate 25 MG tablet         Time Spent on discharge is  38 mins in patient examination, evaluation, counseling as well as medication reconciliation, prescriptions for required medications, discharge plan and follow up. Electronically signed by   Hair Mendez MD  5/8/2019  4:01 PM      Thank you Dr. Teressa Hernández MD for the opportunity to be involved in this patient's care.

## 2019-05-08 NOTE — PROGRESS NOTES
Physical Therapy    Facility/Department: [address] ORTHO/MED SURG  Initial Assessment    NAME: Ressie Dancer. : 1940  MRN: [id number]    Date of Service: 2019    Discharge Recommendations: Further therapy recommended at discharge. Assessment   Body structures, Functions, Activity limitations: Decreased functional mobility ; Decreased strength;Decreased balance;Decreased endurance;Decreased safe awareness  Assessment: Improved ambulation with lift walker noted this date. Pt ambulated 150ft x2 with Min A. The pt continues to require Mod A x2 to ambulate 5ft with RW due to gait and balance deficits. The pt is unsafe to attempt mobility independently due to high fall risk. Patient Education: sequencing for transfers, safety, importance of mobility  REQUIRES PT FOLLOW UP: Yes  Activity Tolerance  Activity Tolerance: Patient limited by endurance       Patient Diagnosis(es): The primary encounter diagnosis was Closed displaced fracture of left femoral neck ([facility] Diagnoses of Stage 3 chronic kidney disease ([address] Dementia associated with other underlying disease without behavioral disturbance, and Facial laceration, initial encounter were also pertinent to this visit. has a past medical history of Arthritis, Dementia, Hypertension, and Spinal stenosis. has a past surgical history that includes Rotator cuff repair (Left); Appendectomy; Colonoscopy; Endoscopy, colon, diagnostic; eye surgery; Partial hip arthroplasty (Left, 2019); and hip pinning (Left, 2019).     Restrictions  Restrictions/Precautions  Restrictions/Precautions: Fall Risk, Weight Bearing, General Precautions  Required Braces or Orthoses?: No  Lower Extremity Weight Bearing Restrictions  Left Lower Extremity Weight Bearing: Weight Bearing As Tolerated  Position Activity Restriction  Other position/activity restrictions: s/p L johny hip 5/2, up with assist   Vision/Hearing      [facility]  Subjective  General  Patient assessed for rehabilitation services?: Yes  Response To Previous Treatment: Patient with no complaints from previous session. Family / Caregiver Present: No  Subjective  Subjective: RN and pt agreeable to PT. Pt alert in bed upon arrival, denies pain at rest, however complains of pain in LLE with ambulation. When asked to rate the pain pt reports, \"It just hurts\". Pain Screening  Patient Currently in Pain: Denies(at rest)  Vital Signs  Patient Currently in Pain: Denies(at rest)       Orientation  Orientation  Overall Orientation Status: Impaired  Orientation Level: Oriented to person;Oriented to place; Disoriented to time;Disoriented to situation  Social/Functional History  Social/Functional History  Lives With: Alone  Type of Home: House  Home Layout: One level  Home Equipment: Rolling walker  Receives Help From: Family, Home health(pt has private pay HHA and RN )  Homemaking Responsibilities: No  Ambulation Assistance: Independent(with RW)  Transfer Assistance: Independent  Active : No  Additional Comments: Above information obtained from chart due to pt inability to answer any social questions. Per chart, pt has HHA and RN during the day and pt nephew stays with pt at night. Pt was using a RW prior to fall at home. Unsure of function with ADLs   Objective                      Bed mobility  Supine to Sit: Moderate assistance;2 Person assistance  Scooting: Moderate assistance  Transfers  Sit to Stand: Moderate Assistance;2 Person Assistance  Stand to sit: Moderate Assistance;2 Person Assistance  Bed to Chair: Maximum assistance(face to face stand pivot transfer)  Comment: Performed sit to stand transfer 3x from lower surfaces with improved sequencing and participation from pt this date.   Ambulation  Ambulation?: Yes  More Ambulation?: Yes  Ambulation 1  Surface: level tile  Device: (lift walker)  Other Apparatus: Wheelchair follow  Assistance: Minimal assistance  Quality of Gait: flexed posture, narrow RAFAELA, lacks TKE bilaterally, improved heel strike and foot flat this date, step to gait progressing to step-through. Distance: 150ft x2  Comments: Pt required standing rest break.  Mod VC's for upright posture and increased step length with good return demo  Ambulation 2  Surface - 2: level tile  Device 2: Rolling Walker  Other Apparatus 2: Wheelchair follow  Assistance 2: Moderate assistance;2 Person assistance  Quality of Gait 2: narrow RAFAELA, flexed posture, unable to clear RLE, unsteady, BLE shakiness noted  Distance: 5ft  Comments: unsafe to continue further ambulation  Stairs/Curb  Stairs?: No     Balance  Posture: Fair  Sitting - Static: Fair;+  Sitting - Dynamic: Fair  Standing - Static: Poor;+  Standing - Dynamic: Poor  Comments: Standing balance assessed with RW, improved balance noted with lift walker this date  Exercises  Comments: Deferred ther ex this date, OT present to complete ADLs     Plan   Plan  Times per week: 5-6x/wk  Current Treatment Recommendations: Strengthening, Balance Training, Functional Mobility Training, Transfer Training, Endurance Training, Safety Education & Training, Home Exercise Program, Patient/Caregiver Education & Training, Equipment Evaluation, Education, & procurement  Safety Devices  Type of devices: Call light within reach, Nurse notified, Gait belt, Patient at risk for falls, Chair alarm in place, Left in chair, All fall risk precautions in place(left in chair with OT present)  Restraints  Initially in place: No      Goals  Short term goals  Time Frame for Short term goals: 14 visits  Short term goal 1: Pt will be Adela to EOB  Short term goal 2: Pt will be Adela to stand  Short term goal 3: Pt will stand 2min CGA with least restrictive AD  Short term goal 4: Ambulate 200ft with least restrictive AD and Mod A       Therapy Time   Individual Concurrent Group Co-treatment   Time In 0828         Time Out 0852         Minutes 24         Timed Code Treatment Minutes: [address] Elena Mendenhall

## 2019-05-15 ENCOUNTER — OFFICE VISIT (OUTPATIENT)
Dept: ORTHOPEDIC SURGERY | Age: 79
End: 2019-05-15

## 2019-05-15 VITALS
BODY MASS INDEX: 24.54 KG/M2 | DIASTOLIC BLOOD PRESSURE: 80 MMHG | WEIGHT: 147.27 LBS | HEART RATE: 56 BPM | SYSTOLIC BLOOD PRESSURE: 134 MMHG | HEIGHT: 65 IN

## 2019-05-15 DIAGNOSIS — S72.002D CLOSED FRACTURE OF NECK OF LEFT FEMUR WITH ROUTINE HEALING, SUBSEQUENT ENCOUNTER: Primary | ICD-10-CM

## 2019-05-15 PROCEDURE — 99024 POSTOP FOLLOW-UP VISIT: CPT | Performed by: ORTHOPAEDIC SURGERY

## 2019-05-15 NOTE — PROGRESS NOTES
Subjective:      Patient ID: Bailee Diane. is a 78 y.o. male. HPI  The patient is status post hemiarthroplasty left hip for fracture. Doing well pain wise. Current Outpatient Medications   Medication Sig Dispense Refill    metoprolol tartrate (LOPRESSOR) 25 MG tablet Take 1 tablet by mouth 2 times daily 60 tablet 3    acetaminophen (TYLENOL) 325 MG tablet Take 650 mg by mouth every 4 hours as needed for Pain      aspirin 81 MG tablet Take 81 mg by mouth daily      mirtazapine (REMERON) 30 MG tablet Take 30 mg by mouth nightly      vitamin E 400 UNIT capsule Take 400 Units by mouth daily      docusate sodium (COLACE) 100 MG capsule Take 100 mg by mouth nightly      tamsulosin (FLOMAX) 0.4 MG capsule Take 0.4 mg by mouth daily      donepezil (ARICEPT) 5 MG tablet Take 1 tablet by mouth nightly 30 tablet 3    levofloxacin (LEVAQUIN) 500 MG tablet Take 500 mg by mouth daily      Ergocalciferol (VITAMIN D2 PO) Take by mouth       No current facility-administered medications for this visit. Review of Systems   Musculoskeletal: Positive for arthralgias, gait problem, joint swelling and myalgias. Past Medical History:   Diagnosis Date    Arthritis     Dementia     Hypertension     Spinal stenosis      Past Surgical History:   Procedure Laterality Date    APPENDECTOMY      COLONOSCOPY      ENDOSCOPY, COLON, DIAGNOSTIC      EYE SURGERY      HIP PINNING Left 5/2/2019    LEFT HIP ERIN-ARTHROPLASTY performed by Kurt Irby MD at [place] [id number] Left 05/02/2019     LEFT HIP ERIN-ARTHROPLASTY     ROTATOR CUFF REPAIR Left     X2     No family history on file.   Social History     Tobacco Use    Smoking status: Former Smoker    Smokeless tobacco: Never Used   Substance Use Topics    Alcohol use: No    Drug use: No       Objective:     Vitals:    05/15/19 0908   BP: 134/80   Pulse: 56   Weight: 147 lb 4.3 oz (66.8 kg)   Height: 5' 4.96\" (1.65 m)     Physical Exam  On exam the incision is healed well. Staples intact. No redness or drainage. Range of motion of the hip with limited pain. Radiology:            Impression:        Assessment:     Visit Diagnoses       Codes    Primary osteoarthritis of right knee    -  Primary M17.11           Plan: We will have the sutures removed at the skilled nursing facility.   He will follow up with me on a when necessary basis     Please be aware that portions of this chart note were created using voice recognition software and that unforseen errors may have occured   Electronically signed by Gelacio Eaton MD on 5/15/2019 at 9:35 AM

## 2019-05-16 ENCOUNTER — TELEPHONE (OUTPATIENT)
Dept: NEUROLOGY | Age: 79
End: 2019-05-16

## 2019-05-16 NOTE — TELEPHONE ENCOUNTER
----- Message from Job Scales MD sent at 5/12/2019 11:59 PM EDT -----  This patient is seen in hospital. I need to see this patient in follow up. Please make sure that this patient has follow up appointment with me.     Please let the patient know that   Thank you.   -dr. Kodi Montez

## 2019-05-16 NOTE — TELEPHONE ENCOUNTER
They gave the patient an appointment with Dr. Savita roland on [facility] due to the location and his home. Do you want me to cancel that appointment and have him come to [facility] instead?

## 2019-06-04 NOTE — PROGRESS NOTES
Physical Therapy  Facility/Department: [address] ORTHO/MED SURG  Daily Treatment Note  NAME: Gómez Almazan : 1940  MRN: [id number]    Date of Service: 2019    Discharge Recommendations: Further therapy recommended at discharge. Patient Diagnosis(es): The primary encounter diagnosis was Closed displaced fracture of left femoral neck ([facility] [address] Diagnoses of Stage 3 chronic kidney disease ([facility] [address] Dementia associated with other underlying disease without behavioral disturbance, and Facial laceration, initial encounter were also pertinent to this visit. has a past medical history of Arthritis, Dementia, Hypertension, and Spinal stenosis. has a past surgical history that includes Rotator cuff repair (Left); Appendectomy; Colonoscopy; Endoscopy, colon, diagnostic; eye surgery; Partial hip arthroplasty (Left, 2019); and hip pinning (Left, 2019). Restrictions  Restrictions/Precautions  Restrictions/Precautions: Fall Risk, Weight Bearing, General Precautions  Required Braces or Orthoses?: No  Lower Extremity Weight Bearing Restrictions  Left Lower Extremity Weight Bearing: Weight Bearing As Tolerated  Position Activity Restriction  Other position/activity restrictions: s/p L johny hip , up with assist   Subjective   General  Response To Previous Treatment: Patient with no complaints from previous session. Family / Caregiver Present: No  Subjective  Subjective: RN and pt agreeable to PT. Pt with significant improvement in alertness this date. Denies pain, complains of fatigue. Pain Screening  Patient Currently in Pain: Denies  Vital Signs  Patient Currently in Pain: Denies       Orientation  Orientation  Overall Orientation Status: Impaired  Orientation Level: Oriented to person;Oriented to place; Disoriented to time;Disoriented to situation  Objective   Bed mobility  Supine to Sit: Moderate assistance;2 Person assistance  Sit to Supine: (retired to bedside chair)  Scooting:  Moderate assistance  Comment: Increased time to complete bed mobility due to delayed response to commands  Transfers  Sit to Stand: Moderate Assistance;2 Person Assistance  Stand to sit: Moderate Assistance;2 Person Assistance  Bed to Chair: Maximum assistance(face to face stand pivot transfer)  Comment: Performed transfers 3x from lower surface. Mod A x2 provided for BUE HHA and blocking BLE to prevent anterior sliding with transfer. VC's for sequencing with improved return demo. Improved ability to follow one step commands compared to prior tx with this writer  Ambulation  Ambulation?: Yes  Ambulation 1  Surface: level tile  Device: (lift walker)  Other Apparatus: Wheelchair follow(IV pole)  Assistance: Moderate assistance  Quality of Gait: unsteady, flexed posture, pt lacks TKE bilaterally, narrow RAFAELA, decreased heel strike  Distance: 80ft + 80ft  Comments: Pt required standing rest break. Max VC's for heel strike, widen RAFAELA and upright posture with poor return demo     Balance  Posture: Fair  Sitting - Static: Fair;+  Sitting - Dynamic: Fair  Standing - Static: Poor;+  Standing - Dynamic: Poor  Comments: Standing balance assessed with lift walker  Exercises  Comments: 10x AROM- LAQs, ankle pumps, punches, elbow flexion                        Assessment   Body structures, Functions, Activity limitations: Decreased functional mobility ; Decreased strength;Decreased balance;Decreased endurance;Decreased safe awareness  Assessment: Improved alertness and ability to follow commands noted this date as pt ambulated 80ft + 80ft with lift walker and Mod A. The pt requires 1-2 assist for all mobility and is unsafe to ambulate household distances due to high fall risk.   Prognosis: Good  Patient Education: importance of mobility, safety, achieving full ROM during ther ex  REQUIRES PT FOLLOW UP: Yes  Activity Tolerance  Activity Tolerance: Patient limited by endurance       Goals  Short term goals  Time Frame for Short term goals: 14 visits  Short term goal 1: Pt will be Adela to EOB  Short term goal 2: Pt will be Adela to stand  Short term goal 3: Pt will stand 2min CGA with least restrictive AD  Short term goal 4: Ambulate 200ft with least restrictive AD and Mod A    Plan    Plan  Times per week: 5-6x/wk  Current Treatment Recommendations: Strengthening, Balance Training, Functional Mobility Training, Transfer Training, Endurance Training, Safety Education & Training, Home Exercise Program, Patient/Caregiver Education & Training, Equipment Evaluation, Education, & procurement  Safety Devices  Type of devices: Call light within reach, Nurse notified, Gait belt, Patient at risk for falls, Chair alarm in place, Left in chair, All fall risk precautions in place  Restraints  Initially in place: No     Therapy Time   Individual Concurrent Group Co-treatment   Time In 0928         Time Out 1000         Minutes [facility],  04-Jun-2019
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
## 2019-06-13 ENCOUNTER — APPOINTMENT (OUTPATIENT)
Dept: GENERAL RADIOLOGY | Age: 79
DRG: 690 | End: 2019-06-13
Payer: MEDICARE

## 2019-06-13 ENCOUNTER — HOSPITAL ENCOUNTER (INPATIENT)
Age: 79
LOS: 5 days | Discharge: HOSPICE | DRG: 690 | End: 2019-06-18
Attending: EMERGENCY MEDICINE | Admitting: INTERNAL MEDICINE
Payer: MEDICARE

## 2019-06-13 DIAGNOSIS — N17.9 AKI (ACUTE KIDNEY INJURY) (HCC): Primary | ICD-10-CM

## 2019-06-13 DIAGNOSIS — E86.0 DEHYDRATION: ICD-10-CM

## 2019-06-13 DIAGNOSIS — E87.5 HYPERKALEMIA: ICD-10-CM

## 2019-06-13 DIAGNOSIS — N39.0 URINARY TRACT INFECTION WITHOUT HEMATURIA, SITE UNSPECIFIED: ICD-10-CM

## 2019-06-13 LAB
-: ABNORMAL
ABSOLUTE EOS #: 0.1 K/UL (ref 0–0.4)
ABSOLUTE IMMATURE GRANULOCYTE: ABNORMAL K/UL (ref 0–0.3)
ABSOLUTE LYMPH #: 0.6 K/UL (ref 1–4.8)
ABSOLUTE MONO #: 0.3 K/UL (ref 0.1–1.3)
ALBUMIN SERPL-MCNC: 3.6 G/DL (ref 3.5–5.2)
ALBUMIN/GLOBULIN RATIO: ABNORMAL (ref 1–2.5)
ALP BLD-CCNC: 88 U/L (ref 40–129)
ALT SERPL-CCNC: 19 U/L (ref 5–41)
AMMONIA: 28 UMOL/L (ref 16–60)
AMORPHOUS: ABNORMAL
ANION GAP SERPL CALCULATED.3IONS-SCNC: 11 MMOL/L (ref 9–17)
ANION GAP SERPL CALCULATED.3IONS-SCNC: 11 MMOL/L (ref 9–17)
AST SERPL-CCNC: 26 U/L
BACTERIA: ABNORMAL
BASOPHILS # BLD: 0 % (ref 0–2)
BASOPHILS ABSOLUTE: 0 K/UL (ref 0–0.2)
BILIRUB SERPL-MCNC: <0.15 MG/DL (ref 0.3–1.2)
BILIRUBIN URINE: NEGATIVE
BUN BLDV-MCNC: 69 MG/DL (ref 8–23)
BUN BLDV-MCNC: 72 MG/DL (ref 8–23)
BUN/CREAT BLD: ABNORMAL (ref 9–20)
BUN/CREAT BLD: ABNORMAL (ref 9–20)
CALCIUM SERPL-MCNC: 9.2 MG/DL (ref 8.6–10.4)
CALCIUM SERPL-MCNC: 9.4 MG/DL (ref 8.6–10.4)
CASTS UA: ABNORMAL /LPF
CHLORIDE BLD-SCNC: 110 MMOL/L (ref 98–107)
CHLORIDE BLD-SCNC: 111 MMOL/L (ref 98–107)
CO2: 24 MMOL/L (ref 20–31)
CO2: 25 MMOL/L (ref 20–31)
COLOR: YELLOW
COMMENT UA: ABNORMAL
CREAT SERPL-MCNC: 1.72 MG/DL (ref 0.7–1.2)
CREAT SERPL-MCNC: 1.9 MG/DL (ref 0.7–1.2)
CRYSTALS, UA: ABNORMAL /HPF
DIFFERENTIAL TYPE: ABNORMAL
EOSINOPHILS RELATIVE PERCENT: 4 % (ref 0–4)
EPITHELIAL CELLS UA: ABNORMAL /HPF
GFR AFRICAN AMERICAN: 42 ML/MIN
GFR AFRICAN AMERICAN: 47 ML/MIN
GFR NON-AFRICAN AMERICAN: 34 ML/MIN
GFR NON-AFRICAN AMERICAN: 39 ML/MIN
GFR SERPL CREATININE-BSD FRML MDRD: ABNORMAL ML/MIN/{1.73_M2}
GLUCOSE BLD-MCNC: 72 MG/DL (ref 70–99)
GLUCOSE BLD-MCNC: 87 MG/DL (ref 70–99)
GLUCOSE BLD-MCNC: 97 MG/DL (ref 75–110)
GLUCOSE URINE: NEGATIVE
HCT VFR BLD CALC: 28.5 % (ref 41–53)
HEMOGLOBIN: 9.1 G/DL (ref 13.5–17.5)
IMMATURE GRANULOCYTES: ABNORMAL %
KETONES, URINE: NEGATIVE
LACTIC ACID: 1.2 MMOL/L (ref 0.5–2.2)
LEUKOCYTE ESTERASE, URINE: ABNORMAL
LIPASE: 44 U/L (ref 13–60)
LYMPHOCYTES # BLD: 18 % (ref 24–44)
MCH RBC QN AUTO: 27 PG (ref 26–34)
MCHC RBC AUTO-ENTMCNC: 31.9 G/DL (ref 31–37)
MCV RBC AUTO: 84.7 FL (ref 80–100)
MONOCYTES # BLD: 8 % (ref 1–7)
MUCUS: ABNORMAL
NITRITE, URINE: NEGATIVE
NRBC AUTOMATED: ABNORMAL PER 100 WBC
OTHER OBSERVATIONS UA: ABNORMAL
PDW BLD-RTO: 18.3 % (ref 11.5–14.9)
PH UA: 6.5 (ref 5–8)
PLATELET # BLD: 108 K/UL (ref 150–450)
PLATELET ESTIMATE: ABNORMAL
PMV BLD AUTO: 7.6 FL (ref 6–12)
POTASSIUM SERPL-SCNC: 5.5 MMOL/L (ref 3.7–5.3)
POTASSIUM SERPL-SCNC: 6 MMOL/L (ref 3.7–5.3)
PROTEIN UA: ABNORMAL
RBC # BLD: 3.37 M/UL (ref 4.5–5.9)
RBC # BLD: ABNORMAL 10*6/UL
RBC UA: ABNORMAL /HPF
RENAL EPITHELIAL, UA: ABNORMAL /HPF
SEG NEUTROPHILS: 70 % (ref 36–66)
SEGMENTED NEUTROPHILS ABSOLUTE COUNT: 2.3 K/UL (ref 1.3–9.1)
SODIUM BLD-SCNC: 146 MMOL/L (ref 135–144)
SODIUM BLD-SCNC: 146 MMOL/L (ref 135–144)
SPECIFIC GRAVITY UA: 1.01 (ref 1–1.03)
TOTAL PROTEIN: 7.6 G/DL (ref 6.4–8.3)
TRICHOMONAS: ABNORMAL
TROPONIN INTERP: ABNORMAL
TROPONIN INTERP: ABNORMAL
TROPONIN T: ABNORMAL NG/ML
TROPONIN T: ABNORMAL NG/ML
TROPONIN, HIGH SENSITIVITY: 81 NG/L (ref 0–22)
TROPONIN, HIGH SENSITIVITY: 88 NG/L (ref 0–22)
TURBIDITY: ABNORMAL
URINE HGB: ABNORMAL
UROBILINOGEN, URINE: NORMAL
WBC # BLD: 3.3 K/UL (ref 3.5–11)
WBC # BLD: ABNORMAL 10*3/UL
WBC UA: ABNORMAL /HPF
YEAST: ABNORMAL

## 2019-06-13 PROCEDURE — 81001 URINALYSIS AUTO W/SCOPE: CPT

## 2019-06-13 PROCEDURE — 87186 SC STD MICRODIL/AGAR DIL: CPT

## 2019-06-13 PROCEDURE — 83605 ASSAY OF LACTIC ACID: CPT

## 2019-06-13 PROCEDURE — 6360000002 HC RX W HCPCS: Performed by: EMERGENCY MEDICINE

## 2019-06-13 PROCEDURE — 96374 THER/PROPH/DIAG INJ IV PUSH: CPT

## 2019-06-13 PROCEDURE — 2580000003 HC RX 258: Performed by: INTERNAL MEDICINE

## 2019-06-13 PROCEDURE — 1200000000 HC SEMI PRIVATE

## 2019-06-13 PROCEDURE — 80048 BASIC METABOLIC PNL TOTAL CA: CPT

## 2019-06-13 PROCEDURE — 85025 COMPLETE CBC W/AUTO DIFF WBC: CPT

## 2019-06-13 PROCEDURE — 99285 EMERGENCY DEPT VISIT HI MDM: CPT

## 2019-06-13 PROCEDURE — 82140 ASSAY OF AMMONIA: CPT

## 2019-06-13 PROCEDURE — 87086 URINE CULTURE/COLONY COUNT: CPT

## 2019-06-13 PROCEDURE — 84484 ASSAY OF TROPONIN QUANT: CPT

## 2019-06-13 PROCEDURE — 36415 COLL VENOUS BLD VENIPUNCTURE: CPT

## 2019-06-13 PROCEDURE — 2580000003 HC RX 258: Performed by: EMERGENCY MEDICINE

## 2019-06-13 PROCEDURE — 80053 COMPREHEN METABOLIC PANEL: CPT

## 2019-06-13 PROCEDURE — 86403 PARTICLE AGGLUT ANTBDY SCRN: CPT

## 2019-06-13 PROCEDURE — 71045 X-RAY EXAM CHEST 1 VIEW: CPT

## 2019-06-13 PROCEDURE — 82947 ASSAY GLUCOSE BLOOD QUANT: CPT

## 2019-06-13 PROCEDURE — 93005 ELECTROCARDIOGRAM TRACING: CPT | Performed by: EMERGENCY MEDICINE

## 2019-06-13 PROCEDURE — 83690 ASSAY OF LIPASE: CPT

## 2019-06-13 RX ORDER — MIRTAZAPINE 30 MG/1
30 TABLET, FILM COATED ORAL NIGHTLY
Status: DISCONTINUED | OUTPATIENT
Start: 2019-06-13 | End: 2019-06-16

## 2019-06-13 RX ORDER — SODIUM CHLORIDE 9 MG/ML
INJECTION, SOLUTION INTRAVENOUS CONTINUOUS
Status: DISCONTINUED | OUTPATIENT
Start: 2019-06-13 | End: 2019-06-14

## 2019-06-13 RX ORDER — ONDANSETRON 2 MG/ML
4 INJECTION INTRAMUSCULAR; INTRAVENOUS EVERY 6 HOURS PRN
Status: DISCONTINUED | OUTPATIENT
Start: 2019-06-13 | End: 2019-06-18 | Stop reason: HOSPADM

## 2019-06-13 RX ORDER — ACETAMINOPHEN 325 MG/1
650 TABLET ORAL EVERY 4 HOURS PRN
Status: DISCONTINUED | OUTPATIENT
Start: 2019-06-13 | End: 2019-06-18 | Stop reason: HOSPADM

## 2019-06-13 RX ORDER — MAGNESIUM SULFATE 1 G/100ML
1 INJECTION INTRAVENOUS PRN
Status: DISCONTINUED | OUTPATIENT
Start: 2019-06-13 | End: 2019-06-18 | Stop reason: HOSPADM

## 2019-06-13 RX ORDER — SODIUM CHLORIDE 0.9 % (FLUSH) 0.9 %
10 SYRINGE (ML) INJECTION PRN
Status: DISCONTINUED | OUTPATIENT
Start: 2019-06-13 | End: 2019-06-18 | Stop reason: HOSPADM

## 2019-06-13 RX ORDER — SODIUM CHLORIDE 0.9 % (FLUSH) 0.9 %
10 SYRINGE (ML) INJECTION EVERY 12 HOURS SCHEDULED
Status: DISCONTINUED | OUTPATIENT
Start: 2019-06-13 | End: 2019-06-18 | Stop reason: HOSPADM

## 2019-06-13 RX ORDER — 0.9 % SODIUM CHLORIDE 0.9 %
1000 INTRAVENOUS SOLUTION INTRAVENOUS ONCE
Status: COMPLETED | OUTPATIENT
Start: 2019-06-13 | End: 2019-06-13

## 2019-06-13 RX ORDER — TAMSULOSIN HYDROCHLORIDE 0.4 MG/1
0.4 CAPSULE ORAL DAILY
Status: DISCONTINUED | OUTPATIENT
Start: 2019-06-14 | End: 2019-06-18 | Stop reason: HOSPADM

## 2019-06-13 RX ORDER — DOCUSATE SODIUM 100 MG/1
100 CAPSULE, LIQUID FILLED ORAL NIGHTLY
Status: DISCONTINUED | OUTPATIENT
Start: 2019-06-13 | End: 2019-06-18 | Stop reason: HOSPADM

## 2019-06-13 RX ADMIN — SODIUM CHLORIDE: 9 INJECTION, SOLUTION INTRAVENOUS at 19:31

## 2019-06-13 RX ADMIN — CALCIUM GLUCONATE 1 G: 98 INJECTION, SOLUTION INTRAVENOUS at 18:06

## 2019-06-13 RX ADMIN — CEFTRIAXONE SODIUM 1 G: 1 INJECTION, POWDER, FOR SOLUTION INTRAMUSCULAR; INTRAVENOUS at 19:29

## 2019-06-13 RX ADMIN — SODIUM CHLORIDE 1000 ML: 9 INJECTION, SOLUTION INTRAVENOUS at 17:46

## 2019-06-13 NOTE — ED NOTES
Warm blanket placed on patient and Dr. Sreedhar Arguetaly notified of temperature.       [facility], RN  06/13/19 4764

## 2019-06-13 NOTE — ED PROVIDER NOTES
at a similar level previously. On reeval, exam unchanged. Discussed with medicine, will admit for further therapy and evaluation. On reeval, exam unchanged, pt is ready for admission at this time. CRITICAL CARE:       PROCEDURES:    Procedures    DIAGNOSTIC RESULTS   EKG:All EKG's are interpreted by the Emergency Department Physician who either signs or Co-signs this chart in the absence of a cardiologist.  Ekg, rate of 68, nsr, normal qrs, nonspecific st seg changes, no acute changes. RADIOLOGY:All plain film, CT, MRI, and formal ultrasound images (except ED bedside ultrasound) are read by the radiologist, see reports below, unless otherwisenoted in MDM or here. 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   Labs Reviewed   COMPREHENSIVE METABOLIC PANEL - Abnormal; Notable for the following components:       Result Value    BUN 72 (*)     CREATININE 1.90 (*)     Sodium 146 (*)     Potassium 6.0 (*)     Chloride 110 (*)     Total Bilirubin <0.15 (*)     GFR Non- 34 (*)     GFR  42 (*)     All other components within normal limits   CBC WITH AUTO DIFFERENTIAL - Abnormal; Notable for the following components:    WBC 3.3 (*)     RBC 3.37 (*)     Hemoglobin 9.1 (*)     Hematocrit 28.5 (*)     RDW 18.3 (*)     Platelets 245 (*)     Seg Neutrophils 70 (*)     Lymphocytes 18 (*)     Monocytes 8 (*)     Absolute Lymph # 0.60 (*)     All other components within normal limits   URINE RT REFLEX TO CULTURE - Abnormal; Notable for the following components:    Turbidity UA CLOUDY (*)     Urine Hgb SMALL (*)     Protein, UA 2+ (*)     Leukocyte Esterase, Urine LARGE (*)     All other components within normal limits   TROPONIN - Abnormal; Notable for the following components:    Troponin, High Sensitivity 88 (*)     All other components within normal limits   MICROSCOPIC URINALYSIS - Abnormal; Notable for the following components:    Bacteria, UA MODERATE (*)     All other components within normal limits   BASIC METABOLIC PANEL W/ REFLEX TO MG FOR LOW K - Abnormal; Notable for the following components:    BUN 69 (*)     CREATININE 1.72 (*)     Sodium 146 (*)     Potassium 5.5 (*)     Chloride 111 (*)     GFR Non- 39 (*)     GFR  47 (*)     All other components within normal limits   TROPONIN - Abnormal; Notable for the following components:    Troponin, High Sensitivity 81 (*)     All other components within normal limits   CULTURE, URINE CATHETER   LIPASE   AMMONIA   LACTIC ACID   TROPONIN   POC GLUCOSE FINGERSTICK       EMERGENCY DEPARTMENTCOURSE:         Vitals:    Vitals:    06/13/19 1745 06/13/19 1800 06/13/19 1832 06/13/19 1904   BP: (!) 139/56 124/86 107/89 (!) 154/69   Pulse: 71 64 65 64   Resp: 12 14 12 18   Temp:   96.6 °F (35.9 °C) 98.5 °F (36.9 °C)   TempSrc:   Temporal Axillary   SpO2: 99% 99% 100% 100%   Weight:       Height:           The patient was given the following medications while in the emergency department:  Orders Placed This Encounter   Medications    0.9 % sodium chloride bolus    calcium gluconate 1 g in dextrose 5 % 100 mL IVPB    cefTRIAXone (ROCEPHIN) 1 g IVPB in 50 mL D5W minibag    sodium chloride flush 0.9 % injection 10 mL    sodium chloride flush 0.9 % injection 10 mL    acetaminophen (TYLENOL) tablet 650 mg    enoxaparin (LOVENOX) injection 30 mg    0.9 % sodium chloride infusion    docusate sodium (COLACE) capsule 100 mg    mirtazapine (REMERON) tablet 30 mg    tamsulosin (FLOMAX) capsule 0.4 mg    metoprolol tartrate (LOPRESSOR) tablet 25 mg    cefTRIAXone (ROCEPHIN) 1 g IVPB in 50 mL D5W minibag     CONSULTS:  IP CONSULT TO INTERNAL MEDICINE    FINAL IMPRESSION      1. MAYITO (acute kidney injury) ([facility] [address]    2. Urinary tract infection without hematuria, site unspecified    3. Hyperkalemia    4.  Dehydration          DISPOSITION/PLAN   DISPOSITION Admitted 06/13/2019 06:21:31 PM      PATIENT REFERRED TO:  John Lewis MD  [address] [facility] [phone]          DISCHARGE MEDICATIONS:  Current Discharge Medication List        Shirlyn Mcburney, MD  Attending Emergency Physician                    Yokasta Evans MD  06/13/19 8724

## 2019-06-13 NOTE — ED TRIAGE NOTES
C= \"Have you ever felt that you should Cut down on your drinking? \"  Refused  A= \"Have people Annoyed you by criticizing your drinking? \"  Refused  G= \"Have you ever felt bad or Guilty about your drinking? \"  Refused  E= \"Have you ever had a drink as an Eye-opener first thing in the morning to steady your nerves or to help a hangover? \"  Refused      Deferred []      Reason for deferring: Patient not answering questions at this time    *If yes to two or more: probable alcohol abuse. *

## 2019-06-13 NOTE — PROGRESS NOTES
Medication History completed:    New medications: none    Medications discontinued: donepezil, ergocalciferol, levofloxacin    Changes to dosing: none    Stated allergies: As listed    Other pertinent information: Medications confirmed with facility list.     Thank you,  Sobeida Garcia, PharmD  [phone]

## 2019-06-14 PROBLEM — R33.9 RETENTION OF URINE: Status: ACTIVE | Noted: 2018-08-11

## 2019-06-14 PROBLEM — N18.30 ACUTE RENAL FAILURE SUPERIMPOSED ON STAGE 3 CHRONIC KIDNEY DISEASE (HCC): Status: ACTIVE | Noted: 2019-06-14

## 2019-06-14 PROBLEM — N17.9 ACUTE RENAL FAILURE SUPERIMPOSED ON STAGE 3 CHRONIC KIDNEY DISEASE (HCC): Status: ACTIVE | Noted: 2019-06-14

## 2019-06-14 LAB
ANION GAP SERPL CALCULATED.3IONS-SCNC: 12 MMOL/L (ref 9–17)
BUN BLDV-MCNC: 62 MG/DL (ref 8–23)
BUN/CREAT BLD: ABNORMAL (ref 9–20)
CALCIUM SERPL-MCNC: 9.4 MG/DL (ref 8.6–10.4)
CHLORIDE BLD-SCNC: 111 MMOL/L (ref 98–107)
CO2: 23 MMOL/L (ref 20–31)
CREAT SERPL-MCNC: 1.57 MG/DL (ref 0.7–1.2)
GFR AFRICAN AMERICAN: 52 ML/MIN
GFR NON-AFRICAN AMERICAN: 43 ML/MIN
GFR SERPL CREATININE-BSD FRML MDRD: ABNORMAL ML/MIN/{1.73_M2}
GFR SERPL CREATININE-BSD FRML MDRD: ABNORMAL ML/MIN/{1.73_M2}
GLUCOSE BLD-MCNC: 62 MG/DL (ref 70–99)
HCT VFR BLD CALC: 26.8 % (ref 41–53)
HEMOGLOBIN: 8.5 G/DL (ref 13.5–17.5)
INR BLD: 1
MCH RBC QN AUTO: 26.8 PG (ref 26–34)
MCHC RBC AUTO-ENTMCNC: 31.8 G/DL (ref 31–37)
MCV RBC AUTO: 84.4 FL (ref 80–100)
NRBC AUTOMATED: ABNORMAL PER 100 WBC
PDW BLD-RTO: 18.6 % (ref 11.5–14.9)
PLATELET # BLD: 93 K/UL (ref 150–450)
PMV BLD AUTO: 8.2 FL (ref 6–12)
POTASSIUM SERPL-SCNC: 5.1 MMOL/L (ref 3.7–5.3)
PROTHROMBIN TIME: 13.6 SEC (ref 11.8–14.6)
RBC # BLD: 3.17 M/UL (ref 4.5–5.9)
SODIUM BLD-SCNC: 146 MMOL/L (ref 135–144)
THYROXINE, FREE: 0.97 NG/DL (ref 0.93–1.7)
TSH SERPL DL<=0.05 MIU/L-ACNC: 8.79 MIU/L (ref 0.3–5)
WBC # BLD: 2.5 K/UL (ref 3.5–11)

## 2019-06-14 PROCEDURE — 2580000003 HC RX 258: Performed by: INTERNAL MEDICINE

## 2019-06-14 PROCEDURE — 85610 PROTHROMBIN TIME: CPT

## 2019-06-14 PROCEDURE — 84439 ASSAY OF FREE THYROXINE: CPT

## 2019-06-14 PROCEDURE — 36415 COLL VENOUS BLD VENIPUNCTURE: CPT

## 2019-06-14 PROCEDURE — 80048 BASIC METABOLIC PNL TOTAL CA: CPT

## 2019-06-14 PROCEDURE — 85027 COMPLETE CBC AUTOMATED: CPT

## 2019-06-14 PROCEDURE — 6360000002 HC RX W HCPCS: Performed by: NURSE PRACTITIONER

## 2019-06-14 PROCEDURE — 6370000000 HC RX 637 (ALT 250 FOR IP): Performed by: NURSE PRACTITIONER

## 2019-06-14 PROCEDURE — 1200000000 HC SEMI PRIVATE

## 2019-06-14 PROCEDURE — 99223 1ST HOSP IP/OBS HIGH 75: CPT | Performed by: INTERNAL MEDICINE

## 2019-06-14 PROCEDURE — 84443 ASSAY THYROID STIM HORMONE: CPT

## 2019-06-14 PROCEDURE — 2580000003 HC RX 258: Performed by: NURSE PRACTITIONER

## 2019-06-14 RX ORDER — VITAMIN E 268 MG
400 CAPSULE ORAL DAILY
Status: DISCONTINUED | OUTPATIENT
Start: 2019-06-14 | End: 2019-06-18 | Stop reason: HOSPADM

## 2019-06-14 RX ORDER — SODIUM CHLORIDE 450 MG/100ML
INJECTION, SOLUTION INTRAVENOUS CONTINUOUS
Status: DISCONTINUED | OUTPATIENT
Start: 2019-06-14 | End: 2019-06-15

## 2019-06-14 RX ADMIN — MIRTAZAPINE 30 MG: 30 TABLET, FILM COATED ORAL at 22:49

## 2019-06-14 RX ADMIN — DOCUSATE SODIUM 100 MG: 100 CAPSULE, LIQUID FILLED ORAL at 22:49

## 2019-06-14 RX ADMIN — SODIUM CHLORIDE: 4.5 INJECTION, SOLUTION INTRAVENOUS at 14:46

## 2019-06-14 RX ADMIN — TAMSULOSIN HYDROCHLORIDE 0.4 MG: 0.4 CAPSULE ORAL at 10:04

## 2019-06-14 RX ADMIN — VITAMIN E CAP 400 UNIT 400 UNITS: 400 CAP at 12:58

## 2019-06-14 RX ADMIN — SODIUM CHLORIDE: 4.5 INJECTION, SOLUTION INTRAVENOUS at 22:51

## 2019-06-14 RX ADMIN — CEFTRIAXONE SODIUM 1 G: 1 INJECTION, POWDER, FOR SOLUTION INTRAMUSCULAR; INTRAVENOUS at 17:58

## 2019-06-14 RX ADMIN — METOPROLOL TARTRATE 25 MG: 25 TABLET ORAL at 22:49

## 2019-06-14 RX ADMIN — METOPROLOL TARTRATE 25 MG: 25 TABLET ORAL at 10:04

## 2019-06-14 NOTE — CARE COORDINATION
CASE MANAGEMENT NOTE:    Admission Date:  6/13/2019 René Hernandez is a 78 y.o.  male    Admitted for : UTI (urinary tract infection) [N39.0]    PCP:  Laura Warren                                Insurance:  [facility] MEDICARE      Current Residence/ Living Arrangements:  From [place]             Is the Patient an [address] with Readmission Risk Score greater than 14%? No  If yes, pt needs a follow up appointment made within 7 days. Family Members/Caregivers that pt would like involved in their care:    Yes    If yes, list name here:  Tim Smith       Is patient in Isolation/One on One/Altered Mental Status? Yes  If yes, skip next question. If no, would they like an I-Pad to  use? NA  If yes, call [phone]. Discharge Plan:  6/14/2019 [employer] MEDICARE; LSW to follow for discharge planning as the patient is from [place] and anticipate return on discharge; Admitted with Altered Mental Status and UTI- Active orders for IVF and IV rocephin; KELLI NEED SIGNED/COMPLETED//KATHY                  Electronically signed by:  Larry Nash RN on 6/14/2019 at 9:53 AM

## 2019-06-14 NOTE — H&P
[address] [facility]     HISTORY AND PHYSICAL EXAMINATION            Date:   6/14/2019  Patientname:  Todd Martinez. Date of admission:  6/13/2019  4:45 PM  MRN:   [id number]  Account:  [de-identified]  YOB: 1940  PCP:    Javid Norman MD  Room:   2068/2068-01  Code Status:    DNR-CC    CHIEF COMPLAINT     Chief Complaint   Patient presents with    Altered Mental Status     HISTORY OF PRESENT ILLNESS  (Character, Onset, Location, Duration,  Exacerbating/RelievingFactors, Radiation,   Associated Symptoms, Severity )      The patient is a 78 y.o. -American male with history of CKD stage III, metabolic encephalopathy, cognitive impairment, and urinary retention, who presents from [facility] with altered mental status. At time of exam, patient is able to state his name, which is apparently his baseline; unable to provide any input into HPI. According to ED report, patient has been less responsive with behavioral changes noted over the past 2 days. Lab work at the [facility] revealed hyperkalemia, Kayexalate was administered, causing elevation of creatinine level. There are no known associated symptoms. Denies all complaints, though unsure of accuracy due to AMS. Denies fever, chills, chest pain, cough, abdominal pain, nausea, vomiting, diarrhea, and urinary symptoms. There are no known aggravating or alleviating factors. Symptoms are reported as being constant and severe. PAST MEDICAL HISTORY   Patient  has a past medical history of Arthritis, Dementia, Hypertension, and Spinal stenosis. PAST SURGICAL HISTORY    Patient  has a past surgical history that includes Rotator cuff repair (Left); Appendectomy; Colonoscopy; Endoscopy, colon, diagnostic; eye surgery; Partial hip arthroplasty (Left, 05/02/2019); and hip pinning (Left, 5/2/2019). FAMILY HISTORY    Patient family history is not on file. SOCIAL HISTORY    Patient  reports that he has quit smoking.  He has never used smokeless tobacco. He reports that he does not drink alcohol or use drugs. HOME MEDICATIONS        Prior to Admission medications    Medication Sig Start Date End Date Taking? Authorizing Provider   metoprolol tartrate (LOPRESSOR) 25 MG tablet Take 1 tablet by mouth 2 times daily 5/7/19  Yes Miguel Bethea MD   acetaminophen (TYLENOL) 325 MG tablet Take 650 mg by mouth every 4 hours as needed for Pain   Yes Historical Provider, MD   aspirin 81 MG chewable tablet Take 81 mg by mouth daily    Yes Historical Provider, MD   mirtazapine (REMERON) 30 MG tablet Take 30 mg by mouth nightly   Yes Historical Provider, MD   vitamin E 400 UNIT capsule Take 400 Units by mouth daily   Yes Historical Provider, MD   docusate sodium (COLACE) 100 MG capsule Take 100 mg by mouth nightly   Yes Historical Provider, MD   tamsulosin (FLOMAX) 0.4 MG capsule Take 0.4 mg by mouth daily   Yes Historical Provider, MD       ALLERGIES      Lisinopril and Peanut-containing drug products    REVIEW OF SYSTEMS     Review of Systems   Unable to perform ROS: Mental status change     PHYSICAL EXAM      BP (!) 124/98   Pulse 71   Temp 98.7 °F (37.1 °C) (Oral)   Resp 16   Ht 5' 4\" (1.626 m)   Wt 147 lb (66.7 kg)   SpO2 93%   BMI 25.23 kg/m²  Body mass index is 25.23 kg/m². Physical Exam   Constitutional: He appears well-developed. No distress. Cachectic and sickly in appearance. HENT:   Head: Normocephalic and atraumatic. Mouth/Throat: Oropharynx is clear and moist.   Eyes: Pupils are equal, round, and reactive to light. Conjunctivae and EOM are normal.   Neck: Normal range of motion. Neck supple. No tracheal deviation present. Cardiovascular: Normal rate, regular rhythm, normal heart sounds and intact distal pulses. Exam reveals no gallop and no friction rub. No murmur heard. Pulmonary/Chest: Effort normal and breath sounds normal. No respiratory distress. He has no wheezes. He has no rales. He exhibits no tenderness. Abdominal: Soft. Bowel sounds are normal. He exhibits no distension. There is no tenderness. There is no guarding. Musculoskeletal: Normal range of motion. He exhibits no edema or tenderness. Lymphadenopathy:     He has no cervical adenopathy. Neurological: He displays no seizure activity. Coordination normal.   Oriented to self only; able to state whole name and spell last name. Skin: Skin is warm and dry. No rash noted. He is not diaphoretic. No erythema. No pallor. Psychiatric: He has a normal mood and affect. His behavior is normal. Thought content normal.     DIAGNOSTICS      EKG:   EKG 12 Lead [[id number]] Collected: 06/13/19 1704   Updated: 06/13/19 1714     Ventricular Rate 68 BPM    Atrial Rate 68 BPM    P-R Interval 176 ms    QRS Duration 84 ms    Q-T Interval 414 ms    QTc Calculation (Bazett) 440 ms    P Axis 48 degrees    R Axis 55 degrees    T Axis 29 degrees   Narrative:     *Poor data quality, interpretation may be adversely affected  Normal sinus rhythm  Nonspecific ST and T wave abnormality  Abnormal ECG  No previous ECGs available       Labs:  CBC:   Recent Labs     06/13/19 1715 06/14/19  0540   WBC 3.3* 2.5*   HGB 9.1* 8.5*   * 93*     BMP:    Recent Labs     06/13/19 1715 06/13/19 2050 06/14/19  0540   * 146* 146*   K 6.0* 5.5* 5.1   * 111* 111*   CO2 25 24 23   BUN 72* 69* 62*   CREATININE 1.90* 1.72* 1.57*   GLUCOSE 87 72 62*     S. Calcium:  Recent Labs     06/14/19  0540   CALCIUM 9.4     S. Ionized Calcium:No results for input(s): IONCA in the last 72 hours. S. Magnesium:No results for input(s): MG in the last 72 hours. S. Phosphorus:No results for input(s): PHOS in the last 72 hours.   S. Glucose:  Recent Labs     06/13/19  1649   POCGLU 97     Glycosylated hemoglobin A1C: No results found for: LABA1C  Hepatic:   Recent Labs     06/13/19 1715   AST 26   ALT 19   ALKPHOS 88     CARDIAC ENZY:   Recent Labs     06/13/19 1715 06/13/19 2050   TROPHS 88* 81* INR:   Recent Labs     06/14/19  0540   INR 1.0     BNP: No results for input(s): PROBNP in the last 72 hours. ABGs: No results for input(s): PH, PCO2, PO2, HCO3, O2SAT in the last 72 hours. Lipids: No results for input(s): CHOL, TRIG, HDL, LDLCALC in the last 72 hours. Invalid input(s): LDL  Pancreatic functions:  Recent Labs     06/13/19  1715   LIPASE 44     S. LacticAcid:   Recent Labs     06/13/19  1715   LACTA 1.2     Thyroid functions: No results found for: TSH   U/A:  Recent Labs     06/13/19  [address]    RBCUA 5 TO 10   MUCUS NOT REPORTED   BACTERIA MODERATE*   SPECGRAV 1.011   LEUKOCYTESUR LARGE*   GLUCOSEU NEGATIVE   AMORPHOUS NOT REPORTED       Imaging/Diagonstics:     Xr Chest Portable    Result Date: 6/13/2019  EXAMINATION: ONE XRAY VIEW OF THE CHEST 6/13/2019 5:09 pm COMPARISON: December 18, 2018 HISTORY: ORDERING SYSTEM PROVIDED HISTORY: Chest Pain TECHNOLOGIST PROVIDED HISTORY: Chest Pain FINDINGS: New gas-filled colon beneath the right hemidiaphragm. Lungs are under aerated but essentially clear. Heart and mediastinum normal.  Bony thorax intact. 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 *    Plan:     Altered Mental Status  -Ammonia 28  -ED work-up reveals UTI    Urinary tract infection  -Rocephin  -Urine culture pending    Troponin elevation  -Trop HS 88, then 81  -most likely related to elevated creat  -Denies chest pain; EKG nonacute    MAYITO superimposed on CKD  -Creat 1.9, then 1.72; baseline near 1.2  history of stage 3 kidney disease  -NS fluid bolus in ED  -continue IVF on admission  -hold diuretics/nephrotoxic medications  -monitor BMP    Consultations:     [address] [facility] [address], APRN - CNP 6/14/2019  6:59 AM    [address]   Ric[address]    Phone [phone]

## 2019-06-14 NOTE — PROGRESS NOTES
DIRK spoke to Rajiv from [facility]. Pt can return to the facility but will need a new pre-cert. It will be helpful to have updated therapy notes over the weekend so facility can start process Monday morning.

## 2019-06-14 NOTE — PROGRESS NOTES
Patient arrived to room 2074 via stretcher. Patient only oriented to self. VSS. Assessment complete. Unable to complete admission at this time. Will continue to monitor.

## 2019-06-14 NOTE — PROGRESS NOTES
Patient confused pulling at IV tubing and trying to get out of bed. Moved to room 2068 to be closer to the nurses station. Will continue to monitor.

## 2019-06-14 NOTE — H&P
Abdominal: Soft. Bowel sounds are normal. He exhibits no distension. There is no tenderness. There is no guarding. Musculoskeletal: Normal range of motion. He exhibits no edema or tenderness. Lymphadenopathy:     He has no cervical adenopathy. Neurological: He displays no seizure activity. Coordination normal.   Oriented to self only; able to state whole name and spell last name. Skin: Skin is warm and dry. No rash noted. He is not diaphoretic. No erythema. No pallor. Psychiatric: He has a normal mood and affect. His behavior is normal. Thought content normal.     DIAGNOSTICS      EKG:   EKG 12 Lead [[id number]] Collected: 06/13/19 1704   Updated: 06/13/19 1714     Ventricular Rate 68 BPM    Atrial Rate 68 BPM    P-R Interval 176 ms    QRS Duration 84 ms    Q-T Interval 414 ms    QTc Calculation (Bazett) 440 ms    P Axis 48 degrees    R Axis 55 degrees    T Axis 29 degrees   Narrative:     *Poor data quality, interpretation may be adversely affected  Normal sinus rhythm  Nonspecific ST and T wave abnormality  Abnormal ECG  No previous ECGs available       Labs:  CBC:   Recent Labs     06/13/19 1715 06/14/19  0540   WBC 3.3* 2.5*   HGB 9.1* 8.5*   * 93*     BMP:    Recent Labs     06/13/19 1715 06/13/19 2050 06/14/19  0540   * 146* 146*   K 6.0* 5.5* 5.1   * 111* 111*   CO2 25 24 23   BUN 72* 69* 62*   CREATININE 1.90* 1.72* 1.57*   GLUCOSE 87 72 62*     S. Calcium:  Recent Labs     06/14/19  0540   CALCIUM 9.4     S. Ionized Calcium:No results for input(s): IONCA in the last 72 hours. S. Magnesium:No results for input(s): MG in the last 72 hours. S. Phosphorus:No results for input(s): PHOS in the last 72 hours.   S. Glucose:  Recent Labs     06/13/19  1649   POCGLU 97     Glycosylated hemoglobin A1C: No results found for: LABA1C  Hepatic:   Recent Labs     06/13/19 1715   AST 26   ALT 19   ALKPHOS 88     CARDIAC ENZY:   Recent Labs     06/13/19 1715 06/13/19 2050   TROPHS 88* 81* INR:   Recent Labs     06/14/19  0540   INR 1.0     BNP: No results for input(s): PROBNP in the last 72 hours. ABGs: No results for input(s): PH, PCO2, PO2, HCO3, O2SAT in the last 72 hours. Lipids: No results for input(s): CHOL, TRIG, HDL, LDLCALC in the last 72 hours. Invalid input(s): LDL  Pancreatic functions:  Recent Labs     06/13/19  1715   LIPASE 44     S. LacticAcid:   Recent Labs     06/13/19  1715   LACTA 1.2     Thyroid functions:   Lab Results   Component Value Date    TSH 8.79 06/14/2019      U/A:  Recent Labs     06/13/19  [address]    RBCUA 5 TO 10   MUCUS NOT REPORTED   BACTERIA MODERATE*   SPECGRAV 1.011   LEUKOCYTESUR LARGE*   GLUCOSEU NEGATIVE   AMORPHOUS NOT REPORTED       Imaging/Diagonstics:     Xr Chest Portable    Result Date: 6/13/2019  EXAMINATION: ONE XRAY VIEW OF THE CHEST 6/13/2019 5:09 pm COMPARISON: December 18, 2018 HISTORY: ORDERING SYSTEM PROVIDED HISTORY: Chest Pain TECHNOLOGIST PROVIDED HISTORY: Chest Pain FINDINGS: New gas-filled colon beneath the right hemidiaphragm. Lungs are under aerated but essentially clear. Heart and mediastinum normal.  Bony thorax intact. 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 *    Plan:     Altered Mental Status  -Ammonia 28  -ED work-up reveals UTI    Urinary tract infection  -Rocephin  -Urine culture pending    Troponin elevation  -Trop HS 88, then 81  -most likely related to elevated creat  -Denies chest pain; EKG nonacute    MAYITO superimposed on CKD  -Creat 1.9, then 1.72; baseline near 1.2  history of stage 3 kidney disease  -NS fluid bolus in ED  -continue IVF on admission  -hold

## 2019-06-15 ENCOUNTER — APPOINTMENT (OUTPATIENT)
Dept: MRI IMAGING | Age: 79
DRG: 690 | End: 2019-06-15
Payer: MEDICARE

## 2019-06-15 LAB
ANION GAP SERPL CALCULATED.3IONS-SCNC: 12 MMOL/L (ref 9–17)
BUN BLDV-MCNC: 53 MG/DL (ref 8–23)
BUN/CREAT BLD: ABNORMAL (ref 9–20)
CALCIUM SERPL-MCNC: 9.2 MG/DL (ref 8.6–10.4)
CHLORIDE BLD-SCNC: 111 MMOL/L (ref 98–107)
CO2: 21 MMOL/L (ref 20–31)
CREAT SERPL-MCNC: 1.39 MG/DL (ref 0.7–1.2)
CULTURE: ABNORMAL
EKG ATRIAL RATE: 68 BPM
EKG P AXIS: 48 DEGREES
EKG P-R INTERVAL: 176 MS
EKG Q-T INTERVAL: 414 MS
EKG QRS DURATION: 84 MS
EKG QTC CALCULATION (BAZETT): 440 MS
EKG R AXIS: 55 DEGREES
EKG T AXIS: 29 DEGREES
EKG VENTRICULAR RATE: 68 BPM
GFR AFRICAN AMERICAN: 60 ML/MIN
GFR NON-AFRICAN AMERICAN: 49 ML/MIN
GFR SERPL CREATININE-BSD FRML MDRD: ABNORMAL ML/MIN/{1.73_M2}
GFR SERPL CREATININE-BSD FRML MDRD: ABNORMAL ML/MIN/{1.73_M2}
GLUCOSE BLD-MCNC: 103 MG/DL (ref 75–110)
GLUCOSE BLD-MCNC: 112 MG/DL (ref 75–110)
GLUCOSE BLD-MCNC: 31 MG/DL (ref 70–99)
GLUCOSE BLD-MCNC: 54 MG/DL (ref 75–110)
GLUCOSE BLD-MCNC: 76 MG/DL (ref 75–110)
HCT VFR BLD CALC: 26.7 % (ref 41–53)
HEMOGLOBIN: 8.4 G/DL (ref 13.5–17.5)
Lab: ABNORMAL
MCH RBC QN AUTO: 27 PG (ref 26–34)
MCHC RBC AUTO-ENTMCNC: 31.3 G/DL (ref 31–37)
MCV RBC AUTO: 86.2 FL (ref 80–100)
NRBC AUTOMATED: ABNORMAL PER 100 WBC
PDW BLD-RTO: 18.6 % (ref 11.5–14.9)
PLATELET # BLD: 88 K/UL (ref 150–450)
PMV BLD AUTO: 7.8 FL (ref 6–12)
POTASSIUM SERPL-SCNC: 5.9 MMOL/L (ref 3.7–5.3)
RBC # BLD: 3.1 M/UL (ref 4.5–5.9)
SODIUM BLD-SCNC: 144 MMOL/L (ref 135–144)
SPECIMEN DESCRIPTION: ABNORMAL
WBC # BLD: 2.9 K/UL (ref 3.5–11)

## 2019-06-15 PROCEDURE — 2580000003 HC RX 258: Performed by: INTERNAL MEDICINE

## 2019-06-15 PROCEDURE — 2500000003 HC RX 250 WO HCPCS: Performed by: INTERNAL MEDICINE

## 2019-06-15 PROCEDURE — 97166 OT EVAL MOD COMPLEX 45 MIN: CPT

## 2019-06-15 PROCEDURE — 85027 COMPLETE CBC AUTOMATED: CPT

## 2019-06-15 PROCEDURE — 99233 SBSQ HOSP IP/OBS HIGH 50: CPT | Performed by: INTERNAL MEDICINE

## 2019-06-15 PROCEDURE — 80048 BASIC METABOLIC PNL TOTAL CA: CPT

## 2019-06-15 PROCEDURE — 93010 ELECTROCARDIOGRAM REPORT: CPT | Performed by: INTERNAL MEDICINE

## 2019-06-15 PROCEDURE — 6360000002 HC RX W HCPCS: Performed by: INTERNAL MEDICINE

## 2019-06-15 PROCEDURE — 36415 COLL VENOUS BLD VENIPUNCTURE: CPT

## 2019-06-15 PROCEDURE — 1200000000 HC SEMI PRIVATE

## 2019-06-15 PROCEDURE — 82947 ASSAY GLUCOSE BLOOD QUANT: CPT

## 2019-06-15 PROCEDURE — 97530 THERAPEUTIC ACTIVITIES: CPT

## 2019-06-15 PROCEDURE — 92610 EVALUATE SWALLOWING FUNCTION: CPT

## 2019-06-15 PROCEDURE — 6360000002 HC RX W HCPCS: Performed by: PSYCHIATRY & NEUROLOGY

## 2019-06-15 PROCEDURE — 97162 PT EVAL MOD COMPLEX 30 MIN: CPT

## 2019-06-15 PROCEDURE — 97535 SELF CARE MNGMENT TRAINING: CPT

## 2019-06-15 PROCEDURE — 99222 1ST HOSP IP/OBS MODERATE 55: CPT | Performed by: PSYCHIATRY & NEUROLOGY

## 2019-06-15 RX ORDER — SODIUM POLYSTYRENE SULFONATE 4.1 MEQ/G
30 POWDER, FOR SUSPENSION ORAL; RECTAL ONCE
Status: DISCONTINUED | OUTPATIENT
Start: 2019-06-15 | End: 2019-06-15

## 2019-06-15 RX ORDER — METOPROLOL TARTRATE 5 MG/5ML
5 INJECTION INTRAVENOUS EVERY 6 HOURS
Status: DISCONTINUED | OUTPATIENT
Start: 2019-06-15 | End: 2019-06-16

## 2019-06-15 RX ORDER — NICOTINE POLACRILEX 4 MG
15 LOZENGE BUCCAL PRN
Status: DISCONTINUED | OUTPATIENT
Start: 2019-06-15 | End: 2019-06-18 | Stop reason: HOSPADM

## 2019-06-15 RX ORDER — SODIUM POLYSTYRENE SULFONATE 4.1 MEQ/G
30 POWDER, FOR SUSPENSION ORAL; RECTAL ONCE
Status: COMPLETED | OUTPATIENT
Start: 2019-06-15 | End: 2019-06-16

## 2019-06-15 RX ORDER — DEXTROSE AND SODIUM CHLORIDE 5; .45 G/100ML; G/100ML
INJECTION, SOLUTION INTRAVENOUS CONTINUOUS
Status: DISCONTINUED | OUTPATIENT
Start: 2019-06-15 | End: 2019-06-18 | Stop reason: HOSPADM

## 2019-06-15 RX ORDER — DEXTROSE MONOHYDRATE 25 G/50ML
12.5 INJECTION, SOLUTION INTRAVENOUS PRN
Status: DISCONTINUED | OUTPATIENT
Start: 2019-06-15 | End: 2019-06-18 | Stop reason: HOSPADM

## 2019-06-15 RX ORDER — LORAZEPAM 2 MG/ML
1 INJECTION INTRAMUSCULAR ONCE
Status: COMPLETED | OUTPATIENT
Start: 2019-06-15 | End: 2019-06-15

## 2019-06-15 RX ORDER — DEXTROSE MONOHYDRATE 50 MG/ML
100 INJECTION, SOLUTION INTRAVENOUS PRN
Status: DISCONTINUED | OUTPATIENT
Start: 2019-06-15 | End: 2019-06-18 | Stop reason: HOSPADM

## 2019-06-15 RX ADMIN — VANCOMYCIN HYDROCHLORIDE 1000 MG: 1 INJECTION, POWDER, LYOPHILIZED, FOR SOLUTION INTRAVENOUS at 17:29

## 2019-06-15 RX ADMIN — METOPROLOL TARTRATE 5 MG: 1 INJECTION, SOLUTION INTRAVENOUS at 16:42

## 2019-06-15 RX ADMIN — DEXTROSE 50 % IN WATER (D50W) INTRAVENOUS SYRINGE 12.5 G: at 07:18

## 2019-06-15 RX ADMIN — DEXTROSE AND SODIUM CHLORIDE: 5; 450 INJECTION, SOLUTION INTRAVENOUS at 11:40

## 2019-06-15 RX ADMIN — DEXTROSE AND SODIUM CHLORIDE: 5; 450 INJECTION, SOLUTION INTRAVENOUS at 20:57

## 2019-06-15 RX ADMIN — DEXTROSE 50 % IN WATER (D50W) INTRAVENOUS SYRINGE 12.5 G: at 22:09

## 2019-06-15 RX ADMIN — LORAZEPAM 1 MG: 2 INJECTION INTRAMUSCULAR; INTRAVENOUS at 20:56

## 2019-06-15 RX ADMIN — SODIUM CHLORIDE: 4.5 INJECTION, SOLUTION INTRAVENOUS at 06:04

## 2019-06-15 NOTE — PROGRESS NOTES
At bedside with Dr. Caron Primrose rounding.  Orders given for STAT MRI brain wo contrast, lorazepam IV 1mg once before MRI and x1 in 15 minutes if needed, STAT EEG and dietician consult

## 2019-06-15 NOTE — DISCHARGE INSTR - COC
Continuity of Care Form    Patient Name: Francisco Mccallum :  1940  MRN:  [id number]    Admit date:  2019  Discharge date:  ***    Code Status Order: [facility]   Advance Directives:   [facility] Documentation     Date/Time Healthcare Directive Type of Healthcare Directive Copy in [address] Agent's Name Healthcare Agent's Phone Number    19  No, patient does not have an advance directive for healthcare treatment -- -- -- -- --          Admitting Physician:  Ginny Brown MD  PCP: Teressa Hernández MD    Discharging Nurse: [facility] Unit/Room#: 2068/2068-01  Discharging Unit Phone Number: ***    Emergency Contact:   Extended Emergency Contact Information  Primary Emergency Contact: Bharathi Kaur [address] Phone: [phone]  Relation: Niece/Nephew  Secondary Emergency Contact: [facility] Phone: [phone]  Relation: Niece/Nephew   needed? No    Past Surgical History:  Past Surgical History:   Procedure Laterality Date    APPENDECTOMY      COLONOSCOPY      ENDOSCOPY, COLON, DIAGNOSTIC      EYE SURGERY      HIP PINNING Left 2019    LEFT HIP ERIN-ARTHROPLASTY performed by Basilio Olivares MD at [facility] Left 2019     LEFT HIP ERIN-ARTHROPLASTY     ROTATOR CUFF REPAIR Left     X2       Immunization History: There is no immunization history on file for this patient.     Active Problems:  Patient Active Problem List   Diagnosis Code    Metabolic encephalopathy X73.93    Stage 3 chronic kidney disease (HCC) N18.3    Anemia due to stage 3 chronic kidney disease (HCC) N18.3, D63.1    MAYITO (acute kidney injury) ([address] N17.9    Dementia F03.90    Essential hypertension I10    Arthritis M19.90    Severe malnutrition ([address] E43    Closed fracture of neck of left femur ([address] S72.002A    Closed displaced fracture of left femoral neck ([address] S72.002A    Cardiac arrhythmia I49.9    Hypernatremia E87.0    Witnessed seizure-like activity [facility]) R56.9    Cognitive impairment R41.89    UTI (urinary tract infection) N39.0    Retention of urine R33.9    Acute renal failure superimposed on stage 3 chronic kidney disease (HCC) N17.9, N18.3       Isolation/Infection:   Isolation          No Isolation            Nurse Assessment:  Last Vital Signs: BP (!) 144/51   Pulse 76   Temp 97.3 °F (36.3 °C) (Axillary)   Resp 15   Ht 5' 4\" (1.626 m)   Wt 147 lb (66.7 kg)   SpO2 91%   BMI 25.23 kg/m²     Last documented pain score (0-10 scale):    Last Weight:   Wt Readings from Last 1 Encounters:   06/13/19 147 lb (66.7 kg)     Mental Status:  {IP PT MENTAL STATUS:20030}    IV Access:  { KELLI IV ACCESS:825766824}    Nursing Mobility/ADLs:  Walking   {[facility] DME IHJY:897262734}  Transfer  {[facility] DME XLFF:797752325}  Bathing  {[facility] DME JXTF:563079060}  Dressing  {[facility] DME PPNF:433551903}  Toileting  {[facility] DME TENF:887927071}  Feeding  {[facility] DME NWOI:469997559}  Med Admin  {[facility] DME PROR:703660825}  Med Delivery   { KELLI MED Delivery:292997588}    Wound Care Documentation and Therapy:  Wound 04/29/19 Thigh Anterior; Left (Active)   Number of days: 47        Elimination:  Continence:   · Bowel: {YES / TM:39277}  · Bladder: {YES / KATHY:78865}  Urinary Catheter: {Urinary Catheter:716075107}   Colostomy/Ileostomy/Ileal Conduit: {YES / VE:52258}       Date of Last BM: ***    Intake/Output Summary (Last 24 hours) at 6/15/2019 1026  Last data filed at 6/15/2019 0630  Gross per 24 hour   Intake 2989.67 ml   Output --   Net 2989.67 ml     I/O last 3 completed shifts:   In: 2989.7 [I.V.:2989.7]  Out: -     Safety Concerns:     [address] KELLI Safety Concerns:591141164}    Impairments/Disabilities:      [address] Impairments/Disabilities:490978506}    Nutrition Therapy:  Current Nutrition Therapy:   [address] Diet List:098563744}    Routes of Feeding: {CHP DME Other Feedings:233587179}  Liquids: {Slp liquid thickness:10899}  Daily Fluid Restriction: {CHP DME Yes amt example:781100373}  Last Modified Barium Swallow with Video (Video Swallowing Test): {Done Not Done MWHX:047988054}    Treatments at the Time of Hospital Discharge:   Respiratory Treatments: ***  Oxygen Therapy:  {Therapy; copd oxygen:97413}  Ventilator:    { CC Vent QWVW:815116190}    Rehab Therapies: {THERAPEUTIC INTERVENTION:9682877430}  Weight Bearing Status/Restrictions: { CC Weight Bearin}  Other Medical Equipment (for information only, NOT a DME order):  {EQUIPMENT:485247954}  Other Treatments: ***    Patient's personal belongings (please select all that are sent with patient):  {CHP DME Belongings:401666519}    RN SIGNATURE:  {Esignature:255198570}    CASE MANAGEMENT/SOCIAL WORK SECTION    Inpatient Status Date: ***    Readmission Risk Assessment Score:  Readmission Risk              Risk of Unplanned Readmission:        23           Discharging to Facility/ Agency   · Name:   · Address:  · Phone:  · Fax:    Dialysis Facility (if applicable)   · Name:  · Address:  · Dialysis Schedule:  · Phone:  · Fax:    / signature: {Esignature:285964967}    PHYSICIAN SECTION    Prognosis: {Prognosis:3549561680}    Condition at Discharge: [facility] Patient Condition:858162637}    Rehab Potential (if transferring to Rehab): {Prognosis:8929224337}    Recommended Labs or Other Treatments After Discharge: ***    Physician Certification: I certify the above information and transfer of Madina Mantilla  is necessary for the continuing treatment of the diagnosis listed and that he requires {Admit to Appropriate Level of Care:72250} for {GREATER/LESS:803178318} 30 days.      Update Admission H&P: {CHP DME Changes in POUNW:669532132}    PHYSICIAN SIGNATURE:  {Esignature:469751916}

## 2019-06-15 NOTE — PLAN OF CARE
Problem: Falls - Risk of:  Goal: Will remain free from falls  Description  Will remain free from falls  6/15/2019 1731 by Mani Wills RN  Outcome: Ongoing  Note:   Call light in reach, 2 top side rails are up. Bed is locked and in lowest position. Uses call light appropriately. Safety maintained and will continue to monitor. No attempt to get out of bed    6/15/2019 0516 by Gina Blandon RN  Outcome: Ongoing  Note:   Patient remains free of incidence/ injury. Bed remains in low position. Bed alarm on. Telesitter in use. Problem: Risk for Impaired Skin Integrity  Goal: Tissue integrity - skin and mucous membranes  Description  Structural intactness and normal physiological function of skin and  mucous membranes. Outcome: Ongoing  Note:   Pt skin integrity remained intact, no new alterations noted. Head to toe completed, see chart assessment.

## 2019-06-15 NOTE — PROGRESS NOTES
stimulation, pt not able to transport bolus to back of mouth for swallowing. SLP had to manually clear oral cavity          RECOMMENDATIONS  1) NPO    2) Alternative form of nutrition/hydration/medication    3) SLP to reassess for PO trials on 6/17/19 and/or appropriateness of MBSS    4) Depending on prognosis for medical condition, may want to consult hospice                Impression  Dysphagia Diagnosis: Severe pharyngeal stage dysphagia; Severe oral stage dysphagia  Dysphagia Impression : Severe Dysphagia  Dysphagia Outcome Severity Scale: Level 1: Severe dysphagia- NPO. Unable to tolerate any PO safely     Treatment Plan  Requires SLP Intervention: No  Duration/Frequency of Treatment: f/u as pt's condition allows          Recommended Diet and Intervention  Diet Solids Recommendation: NPO  Liquid Consistency Recommendation: NPO  Recommended Form of Meds: Via alternative means of nutrition  Recommendations: NPO; Therapeutic feeds with SLP only       Treatment/Goals  Short-term Goals  Timeframe for Short-term Goals: 1 week  Goal 1: Pt will actively participate in PO trials to determine if any clinical s/s of aspiration      Oral Phase Dysfunction  Oral Phase  Oral Phase: Exceptions  Oral Phase Dysfunction  Impaired Mastication: Puree  Pocketing Left: Puree  Decreased Anterior to Posterior Transit: Puree  Lingual/Palatal Residue: Puree  Oral Phase  Oral Phase - Comment: Pt had difficulty taking applesauce off spoon; then just held it in his oral cavity until SLP cleared it out.       Indicators of Pharyngeal Phase Dysfunction   Pharyngeal Phase   Pharyngeal: Unable to assess as unable to get pt to swallow anything    Prognosis  Prognosis  Prognosis for safe diet advancement: poor  Barriers to reach goals: age;inconsistent alertness;cognitive deficits;severity of dysphagia;behavior;fatigue  Individuals consulted  Consulted and agree with results and recommendations: RN    Education  Patient Education Response: No evidence of learning               Therapy Time  SLP Individual Minutes  Time In: 0145  Time Out: 0221  Minutes: [address] [place], [place]  6/15/2019 2:22 PM

## 2019-06-15 NOTE — PROGRESS NOTES
Pharmacy Note  Vancomycin Consult    Rosalina Baltazar. is a 78 y.o. male started on Vancomycin for Urosepsis; consult received from Dr. Manasa Olson to manage therapy. Also receiving the following antibiotics: None. Patient Active Problem List   Diagnosis    Metabolic encephalopathy    Stage 3 chronic kidney disease (HCC)    Anemia due to stage 3 chronic kidney disease (HCC)    MAYITO (acute [facility]) [facility]    Dementia    Essential hypertension    Arthritis    Severe malnutrition (HCC)    Closed fracture of neck of left femur (HCC)    Closed displaced fracture of left femoral neck (HCC)    Cardiac arrhythmia    Hypernatremia    Witnessed seizure-like activity ([facility]    Cognitive impairment    UTI (urinary tract infection)    Retention of urine    Acute renal failure superimposed on stage 3 chronic kidney disease (HCC)       Allergies:  Lisinopril and Peanut-containing drug products     Temp max: 97.3    Recent Labs     06/14/19  0540 06/15/19  0559   BUN 62* 53*       Recent Labs     06/14/19  0540 06/15/19  0559   CREATININE 1.57* 1.39*       Recent Labs     06/14/19  0540 06/15/19  0559   WBC 2.5* 2.9*         Intake/Output Summary (Last 24 hours) at 6/15/2019 1627  Last data filed at 6/15/2019 0630  Gross per 24 hour   Intake 2989.67 ml   Output --   Net 2989.67 ml       Culture Date      Source                       Results  6/15                     Urine                          Pending    Ht Readings from Last 1 Encounters:   06/13/19 5' 4\" (1.626 m)        Wt Readings from Last 1 Encounters:   06/13/19 147 lb (66.7 kg)         Body mass index is 25.23 kg/m². Estimated Creatinine Clearance: 36 mL/min (A) (based on SCr of 1.39 mg/dL (H)). Goal Trough Level: 10-20 mcg/mL    Assessment/Plan:  Will initiate vancomycin 1000 mg IV every 24 hours. Timing of trough level will be determined based on culture results, renal function, and clinical response. Thank you for the consult. Will continue to follow. Anil 108, MS   6/15/2019  4:27 PM

## 2019-06-15 NOTE — PROGRESS NOTES
Critical blood glucose of 31mg/dl noted. Spoke on the phone with Dr. Luis Graves. Orders given for hypoglycemia protocol. Dextrose given as ordered.  Will continue to monitor glucose as ordered

## 2019-06-15 NOTE — H&P
file.    SOCIAL HISTORY    Patient  reports that he has quit smoking. He has never used smokeless tobacco. He reports that he does not drink alcohol or use drugs. HOME MEDICATIONS        Prior to Admission medications    Medication Sig Start Date End Date Taking? Authorizing Provider   metoprolol tartrate (LOPRESSOR) 25 MG tablet Take 1 tablet by mouth 2 times daily 5/7/19  Yes Cierra Rose MD   acetaminophen (TYLENOL) 325 MG tablet Take 650 mg by mouth every 4 hours as needed for Pain   Yes Historical Provider, MD   aspirin 81 MG chewable tablet Take 81 mg by mouth daily    Yes Historical Provider, MD   mirtazapine (REMERON) 30 MG tablet Take 30 mg by mouth nightly   Yes Historical Provider, MD   vitamin E 400 UNIT capsule Take 400 Units by mouth daily   Yes Historical Provider, MD   docusate sodium (COLACE) 100 MG capsule Take 100 mg by mouth nightly   Yes Historical Provider, MD   tamsulosin (FLOMAX) 0.4 MG capsule Take 0.4 mg by mouth daily   Yes Historical Provider, MD       ALLERGIES      Lisinopril and Peanut-containing drug products    REVIEW OF SYSTEMS     Review of Systems   Unable to perform ROS: Mental status change     PHYSICAL EXAM      BP (!) 144/51   Pulse 76   Temp 97.3 °F (36.3 °C) (Axillary)   Resp 15   Ht 5' 4\" (1.626 m)   Wt 147 lb (66.7 kg)   SpO2 91%   BMI 25.23 kg/m²  Body mass index is 25.23 kg/m². Physical Exam   Constitutional: He appears well-developed. No distress. Cachectic and sickly in appearance. HENT:   Head: Normocephalic and atraumatic. Mouth/Throat: Oropharynx is clear and moist.   Eyes: Pupils are equal, round, and reactive to light. Conjunctivae and EOM are normal.   Neck: Normal range of motion. Neck supple. No tracheal deviation present. Cardiovascular: Normal rate, regular rhythm, normal heart sounds and intact distal pulses. Exam reveals no gallop and no friction rub. No murmur heard.   Pulmonary/Chest: Effort normal and breath sounds normal. No respiratory distress. He has no wheezes. He has no rales. He exhibits no tenderness. Abdominal: Soft. Bowel sounds are normal. He exhibits no distension. There is no tenderness. There is no guarding. Musculoskeletal: Normal range of motion. He exhibits no edema or tenderness. Lymphadenopathy:     He has no cervical adenopathy. Neurological: He displays no seizure activity. Coordination normal.   Oriented to self only; able to state whole name and spell last name. Skin: Skin is warm and dry. No rash noted. He is not diaphoretic. No erythema. No pallor. Psychiatric: He has a normal mood and affect. His behavior is normal. Thought content normal.     DIAGNOSTICS      EKG:   EKG 12 Lead [[id number]] Collected: 06/13/19 1704   Updated: 06/13/19 1714     Ventricular Rate 68 BPM    Atrial Rate 68 BPM    P-R Interval 176 ms    QRS Duration 84 ms    Q-T Interval 414 ms    QTc Calculation (Bazett) 440 ms    P Axis 48 degrees    R Axis 55 degrees    T Axis 29 degrees   Narrative:     *Poor data quality, interpretation may be adversely affected  Normal sinus rhythm  Nonspecific ST and T wave abnormality  Abnormal ECG  No previous ECGs available       Labs:  CBC:   Recent Labs     06/13/19  1715 06/14/19  0540 06/15/19  0559   WBC 3.3* 2.5* 2.9*   HGB 9.1* 8.5* 8.4*   * 93* 88*     BMP:    Recent Labs     06/13/19  2050 06/14/19  0540 06/15/19  0559   * 146* 144   K 5.5* 5.1 5.9*   * 111* 111*   CO2 24 23 21   BUN 69* 62* 53*   CREATININE 1.72* 1.57* 1.39*   GLUCOSE 72 62* 31*     S. Calcium:  Recent Labs     06/15/19  0559   CALCIUM 9.2     S. Ionized Calcium:No results for input(s): IONCA in the last 72 hours. S. Magnesium:No results for input(s): MG in the last 72 hours. S. Phosphorus:No results for input(s): PHOS in the last 72 hours.   S. Glucose:  Recent Labs     06/13/19  1649 06/15/19  0741   POCGLU 97 76     Glycosylated hemoglobin A1C: No results found for: LABA1C  Hepatic:   Recent Labs 06/13/19  1715   AST 26   ALT 19   ALKPHOS 88     CARDIAC ENZY:   Recent Labs     06/13/19  1715 06/13/19  2050   TROPHS 88* 81*     INR:   Recent Labs     06/14/19  0540   INR 1.0     BNP: No results for input(s): PROBNP in the last 72 hours. ABGs: No results for input(s): PH, PCO2, PO2, HCO3, O2SAT in the last 72 hours. Lipids: No results for input(s): CHOL, TRIG, HDL, LDLCALC in the last 72 hours. Invalid input(s): LDL  Pancreatic functions:  Recent Labs     06/13/19 1715   LIPASE 44     S. LacticAcid:   Recent Labs     06/13/19 1715   LACTA 1.2     Thyroid functions:   Lab Results   Component Value Date    TSH 8.79 06/14/2019      U/A:  Recent Labs     06/13/19  [address]    RBCUA 5 TO 10   MUCUS NOT REPORTED   BACTERIA MODERATE*   SPECGRAV 1.011   LEUKOCYTESUR LARGE*   GLUCOSEU NEGATIVE   AMORPHOUS NOT REPORTED       Imaging/Diagonstics:     Xr Chest Portable    Result Date: 6/13/2019  EXAMINATION: ONE XRAY VIEW OF THE CHEST 6/13/2019 5:09 pm COMPARISON: December 18, 2018 HISTORY: ORDERING SYSTEM PROVIDED HISTORY: Chest Pain TECHNOLOGIST PROVIDED HISTORY: Chest Pain FINDINGS: New gas-filled colon beneath the right hemidiaphragm. Lungs are under aerated but essentially clear. Heart and mediastinum normal.  Bony thorax intact. 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 *    Plan:     Altered Mental Status  -Ammonia 28  -ED work-up reveals UTI    Urinary tract infection  -Rocephin  -Urine culture pending    Troponin elevation  -Trop HS 88, then 81  -most likely related to elevated creat  -Denies chest pain; EKG nonacute    MAYITO superimposed on CKD  -Creat 1.9, then

## 2019-06-15 NOTE — PROGRESS NOTES
[facility]    Occupational Therapy Evaluation  Date: 6/15/19  Patient Name: Madina Winter. Room:   MRN: [id number]  Account: [de-identified]   : 1940  (75 y.o.) Gender: male     Discharge Recommendations:  [address]       Referring Practitioner: Dr. Harika Rios  Diagnosis: UTI  Additional Pertinent Hx: Hx of dementia and L hip hemiarthroplasty 19. Prior to fall and L hip fracture, Pt was living home alone according to chart. Treatment Diagnosis: Impaired self-care status. Past Medical History:  has a past medical history of Arthritis, Dementia, Hypertension, and Spinal stenosis. Past Surgical History:   has a past surgical history that includes Rotator cuff repair (Left); Appendectomy; Colonoscopy; Endoscopy, colon, diagnostic; eye surgery; Partial hip arthroplasty (Left, 2019); and hip pinning (Left, 2019). Restrictions  Restrictions/Precautions: Fall Risk(LLE WBAT)  Implants present? : Metal implants(L hip hemiarthroplasty 19)     Vitals  Temp: 97.3 °F (36.3 °C)  Pulse: 76  Resp: 15  BP: (!) 144/51  Height: 5' 4\" (162.6 cm)  Weight: 147 lb (66.7 kg)  BMI (Calculated): 25.3  Oxygen Therapy  SpO2: 91 %  O2 Device: None (Room air)  Level of Consciousness: Responds to Voice or New Confusion or Agitation    Subjective  Subjective: \"Get the car\" Pt stated x9. Pt is unable to answer yes/no questions and answered [place] is your name? \" once. Pt is unable to answer PLOF questions this date and perseverates on \"get the car\" while seated EOB.   Overall Orientation Status: Impaired  Orientation Level: Unable to assess(Pt able to state name only)  Vision  Vision: (NO)  Hearing  Hearing: (NO)  Social/Functional History  Lives With: Alone  Type of Home: House  Home Layout: One level  Home Equipment: Rolling walker  Receives Help From: Family, Home health  ADL Assistance: (NO)  Ambulation Assistance: Independent(with RW)  Transfer Assistance: Independent  Active : No  Additional Comments: Above information obtained from OT evaluation on 5/3/19 s/p Pt's L hemiarthroplasty at [facility]. [facility]. Pt is unable to answer questions this date. Per OT evaluation on 5/3/19, Pt has HHA and RN during the day and Pt's nephew stays with Pt at night. Pt was using a RW prior to fall at home. Unsure of Pt's function with ADLs. Objective  Vision - Basic Assessment  Prior Vision: (Unable to assess d/t cognition)   Cognition  Overall Cognitive Status: Impaired  Arousal/Alertness: Inconsistent responses to stimuli  Attention Span: Difficulty attending to directions  Memory: Decreased short term memory, Decreased recall of biographical information, Decreased recall of precautions, Decreased recall of recent events, Decreased long term memory  Following Commands: (unable to follow one-step commands this date)  Safety Judgement: Decreased awareness of need for safety  Awareness of Errors: Decreased awareness of errors  Insights: Decreased awareness of deficits  Sequencing and Organization: Assistance required to implement solutions, Assistance required to generate solutions, Assistance required to identify errors made  Additional Comments: Telesitter present   Perception  Overall Perceptual Status: Impaired  Unilateral Attention: Cues to maintain midline in sitting  Initiation: Hand over hand to initiate tasks  Motor Planning: Cues to use objects appropriately  Perseveration: Perseverates during conversation  Sensation  Overall Sensation Status: (NO)   ADL  Feeding: (Food pocketing noted with nursing notified)  Grooming: Dependent/Total  UE Bathing: Dependent/Total  LE Bathing: Dependent/Total  UE Dressing: Dependent/Total  LE Dressing: Dependent/Total  Toileting: Dependent/Total  Additional Comments: Pt requires total assist for all tasks this date due to absent purposeful actions and inability to follow one-step commands.  Pt sat on EOB for 3-4 mintues with Max A for sitting balance due to L lateral lean. Pt returned to supine with total assist x 2 for toileting tasks d/t soiled brief. OT attempted to engage Pt in rolling to L/R during toileting tasks, however Pt unable to follow commands or tactile cueing. UE Function           LUE Strength  L Hand General: 4/5  LUE Strength Comment: Unable to assess d/t cognition     LUE Tone: Normotonic     LUE AROM (degrees)  LUE General AROM: Unable to assess d/t cognition     Left Hand AROM (degrees)  Left Hand AROM: WFL  RUE Strength  R Hand General: 4/5  RUE Strength Comment: Unable to assess d/t cognition      RUE Tone: Normotonic     RUE AROM (degrees)  RUE General AROM: Unable to assess d/t cognition     Right Hand AROM (degrees)  Right Hand AROM: WFL    Fine Motor Skills  Coordination  Movements Are Fluid And Coordinated: No  Coordination and Movement description: Left UE, Right UE, Fine motor impairments, Gross motor impairments                           Mobility  Supine to Sit: Dependent/Total  Sit to Supine: Dependent/Total       Balance  Sitting Balance: Maximum assistance  Standing Balance: Unable to assess(comment)(d/t impaired cognition and poor sitting balance)        Bed mobility  Rolling to Left: Dependent/Total  Rolling to Right: Dependent/Total  Supine to Sit: Dependent/Total  Sit to Supine: Dependent/Total  Scooting: Dependent/Total  Comment: pt required 1-2 assistance with bed mobility; pt sat bedside x 3-4min with max assist of one        Functional Activity Tolerance  Functional Activity Tolerance: Tolerates < 10 Min exercise, no significant change in vital signs   Assessment  Performance deficits / Impairments: Decreased functional mobility , Decreased ADL status, Decreased strength, Decreased safe awareness, Decreased cognition, Decreased endurance, Decreased balance, Decreased high-level IADLs, Decreased fine motor control  Treatment Diagnosis: Impaired self-care status.   Prognosis: Fair  Decision Making: Medium Complexity  History: moderate chart review  Exam: 9 performance deficits  Assistance / Modification: significant assist  Patient Education: role of OT, sitting midline  Barriers to Learning: dementia  REQUIRES OT FOLLOW UP: Yes  Discharge Recommendations: Subacute/Skilled Nursing Facility  Activity Tolerance: Treatment limited secondary to decreased cognition         Functional Outcome Measures  AM-PAC Daily Activity Inpatient   How much help for putting on and taking off regular lower body clothing?: Total  How much help for Bathing?: Total  How much help for Toileting?: Total  How much help for putting on and taking off regular upper body clothing?: Total  How much help for taking care of personal grooming?: Total  How much help for eating meals?: Total  AM-PAC Inpatient Daily Activity Raw Score: 6  AM-PAC Inpatient ADL T-Scale Score : 17.07  ADL Inpatient CMS 0-100% Score: 100  ADL Inpatient CMS G-Code Modifier : CN       Goals  Patient Goals   Patient goals : Pt unable to verbalize any goals other than \"get the car\"  Short term goals  Time Frame for Short term goals: By discharge  Short term goal 1: Pt will follow one-step commands 25% of the time with repetition as needed to facilitate engagement in self-care. Short term goal 2: Pt will actively participate in 8+ minutes of self-care with Max VCs as needed. Short term goal 3: Pt will perform bed mobility with Max A x 1 to sit EOB for 8+ minutes with Min A for sitting balance for increased participation in self-care. Short term goal 4: Pt will perform sit to stand transfer with Mod A x 2 and [place] Sago for increased participation in mobility during self-care. Short term goal 5: Pt will perform UB ADLs with Mod A and Max VCs. Plan  Safety Devices  Safety Devices in place: Yes  Type of devices:  All fall risk precautions in place, Bed alarm in place, Call light within reach, Patient at risk for falls, Left in bed, Nurse notified     Plan  Times per week:

## 2019-06-15 NOTE — CARE COORDINATION
LSW is following for patient to return to [facility]. A new pre-cert is needed. PT/OT assessed patient today for recommendations. Active order for IV rocephin. Telesitter in room. Will continue to follow for additional discharge needs. KELLI NEEDS SIGNED AND COMPLETED.      Electronically signed by Nemesio Paul RN on 6/15/2019 at 10:25 AM

## 2019-06-15 NOTE — PROGRESS NOTES
Physical Therapy    Facility/Department: [facility]  Initial Assessment    NAME: Mireille Charles. : 1940  MRN: [id number]    Date of Service: 6/15/2019    Discharge Recommendations:  Subacute/Skilled Nursing Facility   PT Equipment Recommendations  Equipment Needed: No    Assessment   Body structures, Functions, Activity limitations: Decreased functional mobility ; Decreased cognition;Decreased high-level IADLs;Decreased balance;Decreased safe awareness  Assessment: Impaired mobility due to decreased cognition and safety issues  Decision Making: Medium Complexity  History: Dementia, L AMARILIS  Exam: decreased cognitin, mobility, balance  Clinical Presentation: evolving  REQUIRES PT FOLLOW UP: Yes  Activity Tolerance  Activity Tolerance: Patient limited by cognitive status       Patient Diagnosis(es): The primary encounter diagnosis was MAYITO (acute kidney injury) ([facility] [address] Diagnoses of Urinary tract infection without hematuria, site unspecified, Hyperkalemia, and Dehydration were also pertinent to this visit. has a past medical history of Arthritis, Dementia, Hypertension, and Spinal stenosis. has a past surgical history that includes Rotator cuff repair (Left); Appendectomy; Colonoscopy; Endoscopy, colon, diagnostic; eye surgery; Partial hip arthroplasty (Left, 2019); and hip pinning (Left, 2019).     Restrictions  Restrictions/Precautions  Restrictions/Precautions: Fall Risk(LLE WBAT)  Implants present? : Metal implants(L hip hemiarthroplasty 19)          Subjective  General  Patient assessed for rehabilitation services?: Yes  Family / Caregiver Present: No  Follows Commands: Impaired(not following commands at this time)  Subjective  Subjective: pt attempts to talk but not making sense  Pain Screening  Patient Currently in Pain: Other (comment)(unable to assess due to cognition)  Vital Signs  Patient Currently in Pain: Other (comment)(unable to assess due to cognition)          Social/Functional

## 2019-06-16 PROBLEM — E43 SEVERE MALNUTRITION (HCC): Status: ACTIVE | Noted: 2019-06-16

## 2019-06-16 LAB
AMMONIA: 22 UMOL/L (ref 16–60)
ANION GAP SERPL CALCULATED.3IONS-SCNC: 9 MMOL/L (ref 9–17)
BUN BLDV-MCNC: 41 MG/DL (ref 8–23)
BUN/CREAT BLD: ABNORMAL (ref 9–20)
CALCIUM SERPL-MCNC: 9.3 MG/DL (ref 8.6–10.4)
CHLORIDE BLD-SCNC: 111 MMOL/L (ref 98–107)
CO2: 22 MMOL/L (ref 20–31)
CREAT SERPL-MCNC: 1.32 MG/DL (ref 0.7–1.2)
GFR AFRICAN AMERICAN: >60 ML/MIN
GFR NON-AFRICAN AMERICAN: 52 ML/MIN
GFR SERPL CREATININE-BSD FRML MDRD: ABNORMAL ML/MIN/{1.73_M2}
GFR SERPL CREATININE-BSD FRML MDRD: ABNORMAL ML/MIN/{1.73_M2}
GLUCOSE BLD-MCNC: 124 MG/DL (ref 75–110)
GLUCOSE BLD-MCNC: 78 MG/DL (ref 75–110)
GLUCOSE BLD-MCNC: 79 MG/DL (ref 70–99)
GLUCOSE BLD-MCNC: 85 MG/DL (ref 75–110)
GLUCOSE BLD-MCNC: 86 MG/DL (ref 75–110)
HCT VFR BLD CALC: 25 % (ref 41–53)
HEMOGLOBIN: 8.1 G/DL (ref 13.5–17.5)
MAGNESIUM: 2 MG/DL (ref 1.6–2.6)
MCH RBC QN AUTO: 27.4 PG (ref 26–34)
MCHC RBC AUTO-ENTMCNC: 32.2 G/DL (ref 31–37)
MCV RBC AUTO: 85 FL (ref 80–100)
NRBC AUTOMATED: ABNORMAL PER 100 WBC
PDW BLD-RTO: 17.8 % (ref 11.5–14.9)
PLATELET # BLD: 77 K/UL (ref 150–450)
PMV BLD AUTO: 7.6 FL (ref 6–12)
POTASSIUM SERPL-SCNC: 5.7 MMOL/L (ref 3.7–5.3)
RBC # BLD: 2.94 M/UL (ref 4.5–5.9)
SODIUM BLD-SCNC: 142 MMOL/L (ref 135–144)
WBC # BLD: 2.5 K/UL (ref 3.5–11)

## 2019-06-16 PROCEDURE — 2580000003 HC RX 258: Performed by: INTERNAL MEDICINE

## 2019-06-16 PROCEDURE — 99233 SBSQ HOSP IP/OBS HIGH 50: CPT | Performed by: INTERNAL MEDICINE

## 2019-06-16 PROCEDURE — 82947 ASSAY GLUCOSE BLOOD QUANT: CPT

## 2019-06-16 PROCEDURE — 36415 COLL VENOUS BLD VENIPUNCTURE: CPT

## 2019-06-16 PROCEDURE — 99233 SBSQ HOSP IP/OBS HIGH 50: CPT | Performed by: PSYCHIATRY & NEUROLOGY

## 2019-06-16 PROCEDURE — 6370000000 HC RX 637 (ALT 250 FOR IP): Performed by: INTERNAL MEDICINE

## 2019-06-16 PROCEDURE — 83735 ASSAY OF MAGNESIUM: CPT

## 2019-06-16 PROCEDURE — 1200000000 HC SEMI PRIVATE

## 2019-06-16 PROCEDURE — 82140 ASSAY OF AMMONIA: CPT

## 2019-06-16 PROCEDURE — 80048 BASIC METABOLIC PNL TOTAL CA: CPT

## 2019-06-16 PROCEDURE — 2500000003 HC RX 250 WO HCPCS: Performed by: INTERNAL MEDICINE

## 2019-06-16 PROCEDURE — 85027 COMPLETE CBC AUTOMATED: CPT

## 2019-06-16 PROCEDURE — 6360000002 HC RX W HCPCS: Performed by: INTERNAL MEDICINE

## 2019-06-16 PROCEDURE — 84146 ASSAY OF PROLACTIN: CPT

## 2019-06-16 RX ORDER — SODIUM POLYSTYRENE SULFONATE 4.1 MEQ/G
15 POWDER, FOR SUSPENSION ORAL; RECTAL ONCE
Status: COMPLETED | OUTPATIENT
Start: 2019-06-16 | End: 2019-06-16

## 2019-06-16 RX ORDER — SODIUM POLYSTYRENE SULFONATE 4.1 MEQ/G
15 POWDER, FOR SUSPENSION ORAL; RECTAL ONCE
Status: DISCONTINUED | OUTPATIENT
Start: 2019-06-16 | End: 2019-06-16

## 2019-06-16 RX ORDER — HYDRALAZINE HYDROCHLORIDE 20 MG/ML
10 INJECTION INTRAMUSCULAR; INTRAVENOUS EVERY 6 HOURS PRN
Status: DISCONTINUED | OUTPATIENT
Start: 2019-06-16 | End: 2019-06-18 | Stop reason: HOSPADM

## 2019-06-16 RX ORDER — AMLODIPINE BESYLATE 5 MG/1
5 TABLET ORAL DAILY
Status: DISCONTINUED | OUTPATIENT
Start: 2019-06-16 | End: 2019-06-16

## 2019-06-16 RX ADMIN — SODIUM POLYSTYRENE SULFONATE 15 G: 1 POWDER ORAL; RECTAL at 13:15

## 2019-06-16 RX ADMIN — VANCOMYCIN HYDROCHLORIDE 1000 MG: 1 INJECTION, POWDER, LYOPHILIZED, FOR SOLUTION INTRAVENOUS at 17:29

## 2019-06-16 RX ADMIN — SODIUM POLYSTYRENE SULFONATE 30 G: 1 POWDER ORAL; RECTAL at 00:07

## 2019-06-16 RX ADMIN — METOPROLOL TARTRATE 5 MG: 1 INJECTION, SOLUTION INTRAVENOUS at 10:13

## 2019-06-16 RX ADMIN — METOPROLOL TARTRATE 5 MG: 1 INJECTION, SOLUTION INTRAVENOUS at 04:27

## 2019-06-16 RX ADMIN — I.V. FAT EMULSION 100 ML: 20 EMULSION INTRAVENOUS at 20:37

## 2019-06-16 RX ADMIN — DEXTROSE AND SODIUM CHLORIDE: 5; 450 INJECTION, SOLUTION INTRAVENOUS at 04:27

## 2019-06-16 RX ADMIN — DEXTROSE AND SODIUM CHLORIDE: 5; 450 INJECTION, SOLUTION INTRAVENOUS at 12:15

## 2019-06-16 RX ADMIN — CALCIUM GLUCONATE: 94 INJECTION, SOLUTION INTRAVENOUS at 20:37

## 2019-06-16 NOTE — PLAN OF CARE
Problem: Falls - Risk of:  Goal: Will remain free from falls  Description  Will remain free from falls  6/16/2019 0901 by Faye Becker RN  Outcome: Ongoing  Note:   Patient remains free from falls so far this shift. Will continue to round at least hourly, place bed in lowest position with call light within reach, and alarm activated. Problem: Risk for Impaired Skin Integrity  Goal: Tissue integrity - skin and mucous membranes  Description  Structural intactness and normal physiological function of skin and  mucous membranes. 6/16/2019 0901 by Faye Becker RN  Outcome: Ongoing  Note:   There are no new skin issues so far this shift. Will continue to assist patient with repositioning at least every two hours.

## 2019-06-16 NOTE — H&P
[facility]     Progress note            Date:   6/16/2019  Patientname:  Dread Genao. Date of admission:  6/13/2019  4:45 PM  MRN:   [id number]  Account:  [de-identified]  YOB: 1940  PCP:    John Lewis MD  Room:   2068/2068-01  Code Status:    DNR-CC    CHIEF COMPLAINT     Chief Complaint   Patient presents with    Altered Mental Status     HISTORY OF PRESENT ILLNESS  (Character, Onset, Location, Duration,  Exacerbating/RelievingFactors, Radiation,   Associated Symptoms, Severity )      The patient is a 78 y.o. -American male with history of CKD stage III, metabolic encephalopathy, cognitive impairment, and urinary retention, who presents from [facility] with altered mental status. At time of exam, patient is able to state his name, which is apparently his baseline; unable to provide any input into HPI. According to ED report, patient has been less responsive with behavioral changes noted over the past 2 days. Lab work at the [facility] revealed hyperkalemia, Kayexalate was administered, causing elevation of creatinine level. There are no known associated symptoms. Denies all complaints, though unsure of accuracy due to AMS. Denies fever, chills, chest pain, cough, abdominal pain, nausea, vomiting, diarrhea, and urinary symptoms. There are no known aggravating or alleviating factors. Symptoms are reported as being constant and severe. 6/15   poorly responsive   not swallowing   bs low cr betetr k up      6/16   k 5.7   cr 1.3    More alert    PAST MEDICAL HISTORY   Patient  has a past medical history of Arthritis, Dementia, Hypertension, and Spinal stenosis. PAST SURGICAL HISTORY    Patient  has a past surgical history that includes Rotator cuff repair (Left); Appendectomy; Colonoscopy; Endoscopy, colon, diagnostic; eye surgery; Partial hip arthroplasty (Left, 05/02/2019); and hip pinning (Left, 5/2/2019).      FAMILY HISTORY    Patient family history is not on file. SOCIAL HISTORY    Patient  reports that he has quit smoking. He has never used smokeless tobacco. He reports that he does not drink alcohol or use drugs. HOME MEDICATIONS        Prior to Admission medications    Medication Sig Start Date End Date Taking? Authorizing Provider   metoprolol tartrate (LOPRESSOR) 25 MG tablet Take 1 tablet by mouth 2 times daily 5/7/19  Yes Cristin Urban MD   acetaminophen (TYLENOL) 325 MG tablet Take 650 mg by mouth every 4 hours as needed for Pain   Yes Historical Provider, MD   aspirin 81 MG chewable tablet Take 81 mg by mouth daily    Yes Historical Provider, MD   mirtazapine (REMERON) 30 MG tablet Take 30 mg by mouth nightly   Yes Historical Provider, MD   vitamin E 400 UNIT capsule Take 400 Units by mouth daily   Yes Historical Provider, MD   docusate sodium (COLACE) 100 MG capsule Take 100 mg by mouth nightly   Yes Historical Provider, MD   tamsulosin (FLOMAX) 0.4 MG capsule Take 0.4 mg by mouth daily   Yes Historical Provider, MD       ALLERGIES      Lisinopril and Peanut-containing drug products    REVIEW OF SYSTEMS     Review of Systems   Unable to perform ROS: Mental status change     PHYSICAL EXAM      BP (!) 159/87   Pulse 57   Temp 97.6 °F (36.4 °C) (Axillary)   Resp 16   Ht 5' 4\" (1.626 m)   Wt 147 lb (66.7 kg)   SpO2 100%   BMI 25.23 kg/m²  Body mass index is 25.23 kg/m². Physical Exam   Constitutional: He appears well-developed. No distress. Cachectic and sickly in appearance. HENT:   Head: Normocephalic and atraumatic. Mouth/Throat: Oropharynx is clear and moist.   Eyes: Pupils are equal, round, and reactive to light. Conjunctivae and EOM are normal.   Neck: Normal range of motion. Neck supple. No tracheal deviation present. Cardiovascular: Normal rate, regular rhythm, normal heart sounds and intact distal pulses. Exam reveals no gallop and no friction rub. No murmur heard.   Pulmonary/Chest: Effort normal and breath sounds normal. No respiratory distress. He has no wheezes. He has no rales. He exhibits no tenderness. Abdominal: Soft. Bowel sounds are normal. He exhibits no distension. There is no tenderness. There is no guarding. Musculoskeletal: Normal range of motion. He exhibits no edema or tenderness. Lymphadenopathy:     He has no cervical adenopathy. Neurological: He displays no seizure activity. Coordination normal.   Oriented to self only; able to state whole name and spell last name. Skin: Skin is warm and dry. No rash noted. He is not diaphoretic. No erythema. No pallor. Psychiatric: He has a normal mood and affect. His behavior is normal. Thought content normal.     DIAGNOSTICS      EKG:   EKG 12 Lead [[id number]] Collected: 06/13/19 1704   Updated: 06/13/19 1714     Ventricular Rate 68 BPM    Atrial Rate 68 BPM    P-R Interval 176 ms    QRS Duration 84 ms    Q-T Interval 414 ms    QTc Calculation (Bazett) 440 ms    P Axis 48 degrees    R Axis 55 degrees    T Axis 29 degrees   Narrative:     *Poor data quality, interpretation may be adversely affected  Normal sinus rhythm  Nonspecific ST and T wave abnormality  Abnormal ECG  No previous ECGs available       Labs:  CBC:   Recent Labs     06/14/19  0540 06/15/19  0559 06/16/19  0618   WBC 2.5* 2.9* 2.5*   HGB 8.5* 8.4* 8.1*   PLT 93* 88* 77*     BMP:    Recent Labs     06/14/19  0540 06/15/19  0559 06/16/19  0618   * 144 142   K 5.1 5.9* 5.7*   * 111* 111*   CO2 23 21 22   BUN 62* 53* 41*   CREATININE 1.57* 1.39* 1.32*   GLUCOSE 62* 31* 79     S. Calcium:  Recent Labs     06/16/19  0618   CALCIUM 9.3     S. Ionized Calcium:No results for input(s): IONCA in the last 72 hours. S. Magnesium:No results for input(s): MG in the last 72 hours. S. Phosphorus:No results for input(s): PHOS in the last 72 hours.   S. Glucose:  Recent Labs     06/16/19  0245 06/16/19  0625 06/16/19  0805   POCGLU 124* 78 78     Glycosylated hemoglobin A1C: No results found for: LABA1C  Hepatic:   Recent Labs     06/13/19  1715   AST 26   ALT 19   ALKPHOS 88     CARDIAC ENZY:   Recent Labs     06/13/19  1715 06/13/19  2050   TROPHS 88* 81*     INR:   Recent Labs     06/14/19  0540   INR 1.0     BNP: No results for input(s): PROBNP in the last 72 hours. ABGs: No results for input(s): PH, PCO2, PO2, HCO3, O2SAT in the last 72 hours. Lipids: No results for input(s): CHOL, TRIG, HDL, LDLCALC in the last 72 hours. Invalid input(s): LDL  Pancreatic functions:  Recent Labs     06/13/19  1715   LIPASE 44     S. LacticAcid:   Recent Labs     06/13/19 1715   LACTA 1.2     Thyroid functions:   Lab Results   Component Value Date    TSH 8.79 06/14/2019      U/A:  Recent Labs     06/13/19  [address]    RBCUA 5 TO 10   MUCUS NOT REPORTED   BACTERIA MODERATE*   SPECGRAV 1.011   LEUKOCYTESUR LARGE*   GLUCOSEU NEGATIVE   AMORPHOUS NOT REPORTED       Imaging/Diagonstics:     Xr Chest Portable    Result Date: 6/13/2019  EXAMINATION: ONE XRAY VIEW OF THE CHEST 6/13/2019 5:09 pm COMPARISON: December 18, 2018 HISTORY: ORDERING SYSTEM PROVIDED HISTORY: Chest Pain TECHNOLOGIST PROVIDED HISTORY: Chest Pain FINDINGS: New gas-filled colon beneath the right hemidiaphragm. Lungs are under aerated but essentially clear. Heart and mediastinum normal.  Bony thorax intact. 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 *    Plan:     Altered Mental Status  -Ammonia 28  -ED work-up reveals UTI    Urinary tract infection  -Rocephin  -Urine culture pending    Troponin elevation  -Trop HS 88, then 81  -most likely related to elevated creat  -Denies chest pain; EKG nonacute    MAYITO superimposed on CKD  -Creat 1.9, then 1.72; baseline near 1.2  history of stage 3 kidney disease  -NS fluid bolus in ED  -continue IVF on admission  -hold diuretics/nephrotoxic medications  -monitor BMP     6/15     ms    poorly responsive   ?  Baseline   bs low    Change ivf to d5 ns   cr better   k 5.7    Rectal kayexalate      neurolgy to see    uti    cont rocephin   no focal  Signs    neurolgy  To see   tsh nl    bp ok           6/16   betetr   ms   k 5.7   kayexalate    cr 1.3 better   bp  Elevated   hr 57   d/c lopressor   start norvasc   d/c pklanning   cont rocephin  For uti and ivf   pt   d/c ecf    in am         Chiquita Hernandez MD   6/16/2019  11:09 AM

## 2019-06-16 NOTE — PLAN OF CARE
Nutrition Problem: Inadequate oral intake  Intervention: Food and/or Nutrient Delivery: Continue NPO, Start Parenteral Nutrition  Nutritional Goals: Meet greater than 75% of estimated nutrition needs

## 2019-06-16 NOTE — PROGRESS NOTES
NEUROLOGY INPATIENT PROGRESS NOTE    6/16/2019         Mick Rodriguez. is a  78 y.o. male admitted on 6/13/2019 with  UTI (urinary tract infection) [N39.0]  Chart reviewed. Discussed with caregivers. No acute issues overnight. MRI brain attempted yesterday and could not be done as he does not sit still even after giving Lorazepam injn. Will get dietician consult today to get iv nutrition to be started on him. Briefly, this is a  78 y.o. male admitted on 6/13/2019 with altered mentation. As per nursing home patient is normally alert and oriented x2 but he has been less responsive for the past 2 days. Patient is not answering any questions. Contacted the nursing staff at [address]. As per functional status; patient was alert and oriented to self and place usually. He is dependent on some of ADLs and he does get around using a wheelchair. But for the past few days he had slowly progressive change in his mentation. Vitals on admit: 148/68, 67/min, 96.3 °F.  Since admitted to [facility]; patient remained nonverbal even though alert and oriented to name call. No witnessed seizure activity. Caregivers also stated that the patient has been pocketing and he was kept n.p.o. and on IV fluids. He was hypoglycemic earlier. Of note; he was recently hospitalized at [place] on 4/28/2019 after mechanical fall found to have left hip fracture-subcapital femoral fracture for which he underwent surgical intervention on 5/2/2019. At that time he had possible seizure-like activity with absence of postictal state. His CT head on 4/28/2019 was unremarkable. His EEG was abnormal with diffuse encephalopathy but without any epileptiform discharges. His neurologic examination was significant for cognitive impairment of moderately severe intensity. He was initiated on Aricept 5 mg qpm.       No current facility-administered medications on file prior to encounter.       Current Outpatient Medications on File Prior to Encounter   Medication Sig Dispense Refill    metoprolol tartrate (LOPRESSOR) 25 MG tablet Take 1 tablet by mouth 2 times daily 60 tablet 3    acetaminophen (TYLENOL) 325 MG tablet Take 650 mg by mouth every 4 hours as needed for Pain      aspirin 81 MG chewable tablet Take 81 mg by mouth daily       mirtazapine (REMERON) 30 MG tablet Take 30 mg by mouth nightly      vitamin E 400 UNIT capsule Take 400 Units by mouth daily      docusate sodium (COLACE) 100 MG capsule Take 100 mg by mouth nightly      tamsulosin (FLOMAX) 0.4 MG capsule Take 0.4 mg by mouth daily       Allergies: Latasha Barrios is allergic to lisinopril and peanut-containing drug products. Past Medical History:   Diagnosis Date    Arthritis     Dementia     Hypertension     Spinal stenosis        Past Surgical History:   Procedure Laterality Date    APPENDECTOMY      COLONOSCOPY      ENDOSCOPY, COLON, DIAGNOSTIC      EYE SURGERY      HIP PINNING Left 5/2/2019    LEFT HIP ERIN-ARTHROPLASTY performed by Mitch Henson MD at [facility] 70 Left 05/02/2019     LEFT HIP ERIN-ARTHROPLASTY     ROTATOR CUFF REPAIR Left     X2     Social History: Latasha Barrios  reports that he has quit smoking. He has never used smokeless tobacco. He reports that he does not drink alcohol or use drugs. History reviewed. No pertinent family history.     Current Medications:     metoprolol  5 mg Intravenous Q6H    vancomycin  1,000 mg Intravenous Q24H    vancomycin (VANCOCIN) intermittent dosing (placeholder)   Other RX Placeholder    enoxaparin  40 mg Subcutaneous Daily    vitamin E  400 Units Oral Daily    sodium chloride flush  10 mL Intravenous 2 times per day    docusate sodium  100 mg Oral Nightly    mirtazapine  30 mg Oral Nightly    tamsulosin  0.4 mg Oral Daily     PRN Meds include: glucose, dextrose, glucagon (rDNA), dextrose, sodium chloride flush, acetaminophen, magnesium sulfate, magnesium hydroxide, ondansetron    ROS: Could not be obtained as patient is not answering any questions. Objective:   BP (!) 147/65   Pulse 68   Temp 98.1 °F (36.7 °C) (Axillary)   Resp 16   Ht 5' 4\" (1.626 m)   Wt 147 lb (66.7 kg)   SpO2 100%   BMI 25.23 kg/m²     Blood pressure range: Systolic (96GNQ), PWU:159 , Min:147 , XKR:780   ; Diastolic (96DKY), KDW:58, Min:65, Max:79      Continuous infusions:    dextrose      dextrose 5 % and 0.45 % NaCl 125 mL/hr at 06/16/19 0427       ON EXAMINATION:  GENERAL  Appears comfortable and in no distress   HEENT  NC/ AT   NECK  Supple and no bruits heard   Cardiovascular  S1, S2 heard; radial pulse intact   MENTAL STATUS:  Alert, oriented to name call only; nonverbal; upon repeated attempts; he does try to vocalize; followed simple commands on occasion; no hallucination or delusion   CRANIAL NERVES: II     -       Pupils reactive b/l.,  Uncooperative for fundus examination; intact venous pulsations; blinks to threat bilaterally. III,IV,VI -  EOMs full, no afferent defect, no                      LONNIE, no ptosis  V     -     Normal facial sensation  VII    -     Normal facial symmetry  VIII, IX,X, XI, XII -could not be assessed   MOTOR FUNCTION:  Normal bulk, normal tone, moves both upper and lower extremities spontaneously and purposefully but no abnormal involuntary movements and no tremors noted. SENSORY FUNCTION:  Withdraws to pinprick in both upper and lower extremities    CEREBELLAR FUNCTION:  No ataxia of limbs noted; no abnormal involuntary movements    REFLEX FUNCTION:  Symmetric 1+ in upper extremities and areflexia in bilateral lower extremities with bilateral equivocal plantars.    STATION and GAIT  Not tested         Data:    Lab Results:     Lab Results   Component Value Date    ALT 19 06/13/2019    AST 26 06/13/2019    TSH 8.79 (H) 06/14/2019    INR 1.0 06/14/2019    [id number] 05/06/2019     Hematology:  Recent Labs

## 2019-06-16 NOTE — PROGRESS NOTES
Dr. Eder Blair notified of patient being unable to perform MRI due to patient attempting to kick legs off MRI table and not remaining still. Dr montiel will attempt again tomorrow.

## 2019-06-16 NOTE — PROGRESS NOTES
Nutrition Assessment (Parenteral Nutrition)    Type and Reason for Visit: Consult(Swallowing difficulty with food. PN ordering and management)    Nutrition Recommendations: Continue NPO status. Start Peripheral solution Clinimix 4.25/10 at 41.7 mL/hr and 100 mL of lipids. Following additives in PN solution: Na acetate: 15 mEq, NaCl: 25 mEq, Calcium gluconate: 5 mEq, Mg sulfate: 8 mEq. Nutrition Assessment: Patient is severely malnourished as evidence by poor p.o intakes, and muscle mass loss. Patient is at risk for further compromsie due to failed swallow study. Will start peripheral PN at 41.7 mL/hr and 100 mL of lipids. Continue NPO status. Will monitor nutrition progression and labs. Malnutrition Assessment:  · Malnutrition Status: Meets the criteria for severe malnutrition  · Context: Acute illness or injury  · Findings of the 6 clinical characteristics of malnutrition (Minimum of 2 out of 6 clinical characteristics is required to make the diagnosis of moderate or severe Protein Calorie Malnutrition based on AND/ASPEN Guidelines):  1. Energy Intake-Less than or equal to 50% of estimated energy requirement, Greater than or equal to 5 days    2. Weight Loss-Unable to assess,    3. Fat Loss-Unable to assess,    4. Muscle Loss-Severe muscle mass loss, Clavicles (pectoralis and deltoids), Temples (temporalis muscle)  5. Fluid Accumulation-No significant fluid accumulation, Extremities  6.   Strength-Not measured    Nutrition Risk Level: High    Nutrient Needs:  · Estimated Daily Total Kcal: 3063-2524 kcal based on [place]-St. Jeor with 1.2-1.3 factor (admission weight)   · Estimated Daily Protein (g): 80-93 gm of protein based on 1.2-1.4 gm/kg of admission weight    Nutrition Diagnosis:   · Problem: Inadequate oral intake  · Etiology: related to Difficulty swallowing     Signs and symptoms:  as evidenced by Swallow study results, NPO status due to medical condition, Severe muscle loss    Objective Information:  · Nutrition-Focused Physical Findings: Altered mental status  · Current Nutrition Therapies:  · Oral Diet Orders: NPO   · Oral Diet intake: NPO  · Parenteral Nutrition Orders:  · Type and Formula: Premix Peripheral, 2-in-1 Custom   · Lipids: 100ml, Daily  · Rate/Volume: 41.7 mL/hr/1000 mL total volume  · Duration: Continuous  · Current PN Order Provides: 710 kcals and 43 gm of protein   · Goal PN Orders Provides: 8348-4716 kcal and 83-93 gm of protein  · Anthropometric Measures:  · Ht: 5' 4\" (162.6 cm)   · Current Body Wt: 147 lb (66.7 kg)  · Admission Body Wt: 147 lb (66.7 kg)(estimated)  · Ideal Body Wt: 130 lb (59 kg), % Ideal Body 113%  · BMI Classification: BMI 25.0 - 29.9 Overweight    Nutrition Interventions:   Continue NPO, Start Parenteral Nutrition  Education Not Indicated, Continued Inpatient Monitoring    Nutrition Evaluation:   · Evaluation: Goals set   · Goals: Meet greater than 75% of estimated nutrition needs   · Monitoring: Nutrition Progression, Weight, Pertinent Labs, Monitor Bowel Function, PN Intake, PN Tolerance, I&O, Skin Integrity, Mental Status/Confusion      Clemente BROOKS, R.D, L.D,  Clinical Dietitian  Cell # [phone]  Office # [phone]

## 2019-06-16 NOTE — CARE COORDINATION
ONGOING DISCHARGE PLAN:    LSW is following for patient to return to [facility]. New precert is needed. PT/OT recommends skilled nursing facility. On IV vanco. Second attempt for brain MRI today. First attempt yesterday was unsuccessful due to patient's restlessness on the MRI table. Telesitter in room. Will continue to follow for additional discharge needs. KELLI NEEDS SIGNED AND COMPLETED.      Electronically signed by Vargas Freeman RN on 6/16/2019 at 11:03 AM

## 2019-06-17 LAB
ALBUMIN SERPL-MCNC: 3 G/DL (ref 3.5–5.2)
ALBUMIN/GLOBULIN RATIO: ABNORMAL (ref 1–2.5)
ALP BLD-CCNC: 88 U/L (ref 40–129)
ALT SERPL-CCNC: 12 U/L (ref 5–41)
ANION GAP SERPL CALCULATED.3IONS-SCNC: 10 MMOL/L (ref 9–17)
AST SERPL-CCNC: 19 U/L
BILIRUB SERPL-MCNC: 0.17 MG/DL (ref 0.3–1.2)
BUN BLDV-MCNC: 32 MG/DL (ref 8–23)
BUN/CREAT BLD: ABNORMAL (ref 9–20)
CALCIUM SERPL-MCNC: 9.4 MG/DL (ref 8.6–10.4)
CHLORIDE BLD-SCNC: 112 MMOL/L (ref 98–107)
CO2: 21 MMOL/L (ref 20–31)
CREAT SERPL-MCNC: 1.19 MG/DL (ref 0.7–1.2)
GFR AFRICAN AMERICAN: >60 ML/MIN
GFR NON-AFRICAN AMERICAN: 59 ML/MIN
GFR SERPL CREATININE-BSD FRML MDRD: ABNORMAL ML/MIN/{1.73_M2}
GFR SERPL CREATININE-BSD FRML MDRD: ABNORMAL ML/MIN/{1.73_M2}
GLUCOSE BLD-MCNC: 102 MG/DL (ref 75–110)
GLUCOSE BLD-MCNC: 102 MG/DL (ref 75–110)
GLUCOSE BLD-MCNC: 74 MG/DL (ref 75–110)
GLUCOSE BLD-MCNC: 78 MG/DL (ref 75–110)
GLUCOSE BLD-MCNC: 82 MG/DL (ref 75–110)
GLUCOSE BLD-MCNC: 88 MG/DL (ref 70–99)
GLUCOSE BLD-MCNC: 90 MG/DL (ref 75–110)
HCT VFR BLD CALC: 26.4 % (ref 41–53)
HEMOGLOBIN: 8.4 G/DL (ref 13.5–17.5)
MAGNESIUM: 1.9 MG/DL (ref 1.6–2.6)
MCH RBC QN AUTO: 27.6 PG (ref 26–34)
MCHC RBC AUTO-ENTMCNC: 31.9 G/DL (ref 31–37)
MCV RBC AUTO: 86.4 FL (ref 80–100)
NRBC AUTOMATED: ABNORMAL PER 100 WBC
PDW BLD-RTO: 17.8 % (ref 11.5–14.9)
PHOSPHORUS: 3.5 MG/DL (ref 2.5–4.5)
PLATELET # BLD: 85 K/UL (ref 150–450)
PMV BLD AUTO: 7.8 FL (ref 6–12)
POTASSIUM SERPL-SCNC: 5.3 MMOL/L (ref 3.7–5.3)
PREALBUMIN: 14.8 MG/DL (ref 20–40)
PROLACTIN: 4.98 UG/L (ref 4.04–15.2)
RBC # BLD: 3.05 M/UL (ref 4.5–5.9)
SODIUM BLD-SCNC: 143 MMOL/L (ref 135–144)
TOTAL PROTEIN: 6.8 G/DL (ref 6.4–8.3)
TRIGL SERPL-MCNC: 115 MG/DL
WBC # BLD: 2.8 K/UL (ref 3.5–11)

## 2019-06-17 PROCEDURE — 97110 THERAPEUTIC EXERCISES: CPT

## 2019-06-17 PROCEDURE — 84134 ASSAY OF PREALBUMIN: CPT

## 2019-06-17 PROCEDURE — 84478 ASSAY OF TRIGLYCERIDES: CPT

## 2019-06-17 PROCEDURE — 1200000000 HC SEMI PRIVATE

## 2019-06-17 PROCEDURE — 2500000003 HC RX 250 WO HCPCS: Performed by: INTERNAL MEDICINE

## 2019-06-17 PROCEDURE — 99232 SBSQ HOSP IP/OBS MODERATE 35: CPT | Performed by: INTERNAL MEDICINE

## 2019-06-17 PROCEDURE — 99232 SBSQ HOSP IP/OBS MODERATE 35: CPT | Performed by: PSYCHIATRY & NEUROLOGY

## 2019-06-17 PROCEDURE — 36415 COLL VENOUS BLD VENIPUNCTURE: CPT

## 2019-06-17 PROCEDURE — 6360000002 HC RX W HCPCS: Performed by: INTERNAL MEDICINE

## 2019-06-17 PROCEDURE — 95819 EEG AWAKE AND ASLEEP: CPT

## 2019-06-17 PROCEDURE — 2580000003 HC RX 258: Performed by: INTERNAL MEDICINE

## 2019-06-17 PROCEDURE — 85027 COMPLETE CBC AUTOMATED: CPT

## 2019-06-17 PROCEDURE — 97530 THERAPEUTIC ACTIVITIES: CPT

## 2019-06-17 PROCEDURE — 80053 COMPREHEN METABOLIC PANEL: CPT

## 2019-06-17 PROCEDURE — 84100 ASSAY OF PHOSPHORUS: CPT

## 2019-06-17 PROCEDURE — 83735 ASSAY OF MAGNESIUM: CPT

## 2019-06-17 PROCEDURE — 82947 ASSAY GLUCOSE BLOOD QUANT: CPT

## 2019-06-17 PROCEDURE — 97535 SELF CARE MNGMENT TRAINING: CPT

## 2019-06-17 PROCEDURE — 95816 EEG AWAKE AND DROWSY: CPT | Performed by: PSYCHIATRY & NEUROLOGY

## 2019-06-17 RX ADMIN — DEXTROSE AND SODIUM CHLORIDE: 5; 450 INJECTION, SOLUTION INTRAVENOUS at 12:40

## 2019-06-17 RX ADMIN — Medication 10 ML: at 20:21

## 2019-06-17 RX ADMIN — I.V. FAT EMULSION 250 ML: 20 EMULSION INTRAVENOUS at 20:21

## 2019-06-17 RX ADMIN — VANCOMYCIN HYDROCHLORIDE 1000 MG: 1 INJECTION, POWDER, LYOPHILIZED, FOR SOLUTION INTRAVENOUS at 18:07

## 2019-06-17 RX ADMIN — CALCIUM GLUCONATE: 94 INJECTION, SOLUTION INTRAVENOUS at 20:21

## 2019-06-17 RX ADMIN — DEXTROSE AND SODIUM CHLORIDE: 5; 450 INJECTION, SOLUTION INTRAVENOUS at 00:28

## 2019-06-17 NOTE — PROGRESS NOTES
[address]    INPATIENT OCCUPATIONAL THERAPY  PROGRESS NOTE  Date: 2019  Patient Name: Emily Webber Room:   MRN: [id number]    : 1940  (75 y.o.) Gender: male     Discharge Recommendations:  [address]       Referring Practitioner: Dr. Sarah Forte  Diagnosis: UTI  General  Chart Reviewed: Yes  Patient assessed for rehabilitation services?: Yes  Additional Pertinent Hx: Hx of dementia and L hip hemiarthroplasty 19. Prior to fall and L hip fracture, Pt was living home alone according to chart. Family / Caregiver Present: No  Referring Practitioner: Dr. Sarah Forte  Diagnosis: UTI    Restrictions  Restrictions/Precautions: Fall Risk(LLE WBAT)  Implants present? : Metal implants(L hip hemiarthroplasty 19)      Subjective  Subjective: \"I don't want to right now\" referring to balloon tap. Pt does no verbalize but 3x during tx; primarily to his prior caregiver. Comments: Pt has telesitter. Did not follow any commands.    Patient Currently in Pain: Other (comment)(unable to assess due to cognition)  Overall Orientation Status: Impaired  Orientation Level: Unable to assess(Pt able to state name only)          Objective  Cognition  Overall Cognitive Status: Impaired  Arousal/Alertness: Inconsistent responses to stimuli  Attention Span: Difficulty attending to directions  Memory: Decreased short term memory;Decreased recall of biographical information;Decreased recall of precautions;Decreased recall of recent events;Decreased long term memory  Following Commands: (unable to follow one-step commands this date)  Safety Judgement: Decreased awareness of need for safety  Awareness of Errors: Decreased awareness of errors  Insights: Decreased awareness of deficits  Sequencing and Organization: Assistance required to implement solutions;Assistance required to generate solutions;Assistance required to identify errors made  Additional Comments: Telesitter present; attempted multiple one step commands, pt unable to follow through c any; pt often noted engaged in visual hallucinations and demo max difficulty c redirection  Bed mobility  Supine to Sit: Moderate assistance  Sit to Supine: Moderate assistance  Scooting: Dependent/Total(to HOB)  Comment: pt required physical A to clear heels from EOB; once heels cleare pt able to reposition self to upright and maintain EOB c CGA; pt required MaxA for BLE to return to supine; limited by flexion contractures  Balance  Sitting Balance: Contact guard assistance(tactile input/visual cuing for L lean correctionc P return)  Standing Balance: Unable to assess(comment)(d/t impaired cognition and no following commands)         ADL  Feeding: NPO  Grooming: Dependent/Total  UE Bathing: Dependent/Total  LE Bathing: Dependent/Total  UE Dressing: Dependent/Total  LE Dressing: Dependent/Total  Toileting: Dependent/Total  Additional Comments: Pt requires total assist for all tasks this date due to absent purposeful actions and inability to follow one-step commands. Pt sat on EOB for 25 mintues with CGA for sitting balance. SARAH attempted to engage patient in oral care using damped sponge, pt allowed writer to place sponge in his mouth; pt appeared to attempt to 'drink' water from sponge using sponge; minimal water utilized 2* decreased cognition; pt did not fxl participate. Exercise: Pt declined balloon tap/UB exercises EOB. Positioning  Adaptations: U shaped rolled towel placed behind pt head for cervical support/compensation 2* demo max difficulty relaxating neck/back muscles in resting supine position       Assessment  Performance deficits / Impairments: Decreased functional mobility ; Decreased ADL status; Decreased strength;Decreased safe awareness;Decreased cognition;Decreased endurance;Decreased balance;Decreased high-level IADLs;Decreased fine motor control  Assessment: required decreased A for seatd balance and bed mobs; heavily limited by cognition  Prognosis: Fair  Discharge Recommendations: Subacute/Skilled Nursing Facility  Activity Tolerance: Treatment limited secondary to decreased cognition  Safety Devices in place: Yes  Type of devices: All fall risk precautions in place; Bed alarm in place;Call light within reach; Patient at risk for falls; Left in bed;Nurse notified  Equipment Recommendations  Equipment Needed: (TBD)          Patient Education:  Patient Education: apply lotion to hands, pt unable to understand  Learner:patient and prior caregiver (pt visitor report)  Method: demonstration and explanation       Outcome: needs reinforcement and max difficulty retaining new info 2* unfamiliar setting/cognitive status     Plan  Safety Devices  Safety Devices in place: Yes  Type of devices: All fall risk precautions in place, Bed alarm in place, Call light within reach, Patient at risk for falls, Left in bed, Nurse notified  Plan  Times per week: 5  Times per day: Daily  Current Treatment Recommendations: Self-Care / ADL, Strengthening, ROM, Balance Training, Endurance Training, Functional Mobility Training, Pain Management, Safety Education & Training, Patient/Caregiver Education & Training, Equipment Evaluation, Education, & procurement, Cognitive Reorientation, Positioning, Cognitive/Perceptual Training      Goals  Short term goals  Time Frame for Short term goals: By discharge  Short term goal 1: Pt will follow one-step commands 25% of the time with repetition as needed to facilitate engagement in self-care. Short term goal 2: Pt will actively participate in 8+ minutes of self-care with Max VCs as needed. Short term goal 3: Pt will perform bed mobility with Max A x 1 to sit EOB for 8+ minutes with Min A for sitting balance for increased participation in self-care. Short term goal 4: Pt will perform sit to stand transfer with Mod A x 2 and Clora Drier for increased participation in mobility during self-care.   Short term goal 5: Pt will perform UB ADLs with Mod A and Max VCs.     OT Individual Minutes  Time In: 0168  Time Out: 9678  Minutes: 36      Electronically signed by SARAH Torres on 6/17/19 at 4:53 PM

## 2019-06-17 NOTE — PROGRESS NOTES
NEUROLOGY INPATIENT PROGRESS NOTE    6/17/2019         Maria A Cooley. is a  78 y.o. male admitted on 6/13/2019 with  UTI (urinary tract infection) [N39.0]  Chart reviewed. Discussed with caregivers. No acute issues overnight. He is presently on parenteral nutrition. Presently patient is much more alert and awake and able to answer \"yes or no\" for most of the questions. He is oriented to self and place but not to the month. Able to move extremities well. No witnessed seizure activity. Has not received Mirtazapine last night and he seems to be much more alert and awake today. Briefly, this is a  78 y.o. male admitted on 6/13/2019 with altered mentation. As per nursing home patient is normally alert and oriented x2 but he has been less responsive for the past 2 days. Patient is not answering any questions. Contacted the nursing staff at [address]. As per functional status; patient was alert and oriented to self and place usually. He is dependent on some of ADLs and he does get around using a wheelchair. But for the past few days he had slowly progressive change in his mentation. Vitals on admit: 148/68, 67/min, 96.3 °F.  Since admitted to [facility]; patient remained nonverbal even though alert and oriented to name call. No witnessed seizure activity. Caregivers also stated that the patient has been pocketing and he was kept n.p.o. and on IV fluids. He was hypoglycemic earlier. Of note; he was recently hospitalized at [facility] on 4/28/2019 after mechanical fall found to have left hip fracture-subcapital femoral fracture for which he underwent surgical intervention on 5/2/2019. At that time he had possible seizure-like activity with absence of postictal state. His CT head on 4/28/2019 was unremarkable. His EEG was abnormal with diffuse encephalopathy but without any epileptiform discharges.   His neurologic examination was significant for cognitive impairment of moderately severe intensity. He was initiated on Aricept 5 mg qpm.       No current facility-administered medications on file prior to encounter. Current Outpatient Medications on File Prior to Encounter   Medication Sig Dispense Refill    metoprolol tartrate (LOPRESSOR) 25 MG tablet Take 1 tablet by mouth 2 times daily 60 tablet 3    acetaminophen (TYLENOL) 325 MG tablet Take 650 mg by mouth every 4 hours as needed for Pain      aspirin 81 MG chewable tablet Take 81 mg by mouth daily       mirtazapine (REMERON) 30 MG tablet Take 30 mg by mouth nightly      vitamin E 400 UNIT capsule Take 400 Units by mouth daily      docusate sodium (COLACE) 100 MG capsule Take 100 mg by mouth nightly      tamsulosin (FLOMAX) 0.4 MG capsule Take 0.4 mg by mouth daily       Allergies: John Bar. is allergic to lisinopril and peanut-containing drug products. Past Medical History:   Diagnosis Date    Arthritis     Dementia     Hypertension     Spinal stenosis        Past Surgical History:   Procedure Laterality Date    APPENDECTOMY      COLONOSCOPY      ENDOSCOPY, COLON, DIAGNOSTIC      EYE SURGERY      HIP PINNING Left 5/2/2019    LEFT HIP ERIN-ARTHROPLASTY performed by Kassandra Sears MD at [place] [address] Left 05/02/2019     LEFT HIP ERIN-ARTHROPLASTY     ROTATOR CUFF REPAIR Left     X2     Social History: John Bar.  reports that he has quit smoking. He has never used smokeless tobacco. He reports that he does not drink alcohol or use drugs. History reviewed. No pertinent family history.     Current Medications:     fat emulsion  250 mL Intravenous Daily    insulin lispro  0-12 Units Subcutaneous 4 times per day    vancomycin  1,000 mg Intravenous Q24H    vancomycin (VANCOCIN) intermittent dosing (placeholder)   Other RX Placeholder    enoxaparin  40 mg Subcutaneous Daily    vitamin E  400 Units Oral Daily    sodium chloride flush  10 mL Intravenous 2 times per day    docusate sodium  100 mg Oral Nightly    tamsulosin  0.4 mg Oral Daily     PRN Meds include: hydrALAZINE, glucose, dextrose, glucagon (rDNA), dextrose, sodium chloride flush, acetaminophen, magnesium sulfate, magnesium hydroxide, ondansetron    ROS: Could not be obtained as patient is not answering any questions. Objective:   /82   Pulse 77   Temp 97.1 °F (36.2 °C) (Oral)   Resp 16   Ht 5' 4\" (1.626 m)   Wt 147 lb (66.7 kg)   SpO2 98%   BMI 25.23 kg/m²     Blood pressure range: Systolic (60KBB), VEC:528 , Min:117 , PQN:582   ; Diastolic (98DAA), GZK:49, Min:54, Max:82      Continuous infusions:    PN-Adult 2-in-1 Peripheral Line (Standard)      PN-Adult 2-in-1 Peripheral Line (Standard) 41.7 mL/hr at 06/16/19 2037    dextrose      dextrose 5 % and 0.45 % NaCl 75 mL/hr at 06/17/19 1240       ON EXAMINATION:  GENERAL  Appears comfortable and in no distress   HEENT  NC/ AT   NECK  Supple and no bruits heard   Cardiovascular  S1, S2 heard; radial pulse intact   MENTAL STATUS:  Alert, oriented to name call only; nonverbal; upon repeated attempts; he does try to vocalize; followed simple commands on occasion; no hallucination or delusion   CRANIAL NERVES: II     -       Pupils reactive b/l.,  Uncooperative for fundus examination; intact venous pulsations; blinks to threat bilaterally. III,IV,VI -  EOMs full, no afferent defect, no                      LONNIE, no ptosis  V     -     Normal facial sensation  VII    -     Normal facial symmetry  VIII, IX,X, XI, XII -could not be assessed   MOTOR FUNCTION:  Normal bulk, normal tone, moves both upper and lower extremities spontaneously and purposefully but no abnormal involuntary movements and no tremors noted.      SENSORY FUNCTION:  Withdraws to pinprick in both upper and lower extremities    CEREBELLAR FUNCTION:  No ataxia of limbs noted; no abnormal involuntary movements    REFLEX FUNCTION:  Symmetric 1+ in upper extremities and areflexia in bilateral lower extremities with bilateral equivocal plantars. STATION and GAIT  Not tested         Data:    Lab Results:     Lab Results   Component Value Date    TRIG 115 06/17/2019    ALT 12 06/17/2019    AST 19 06/17/2019    TSH 8.79 (H) 06/14/2019    INR 1.0 06/14/2019    RINISXPL20 985 05/06/2019     Hematology:  Recent Labs     06/15/19  0559 06/16/19  0618 06/17/19  0551   WBC 2.9* 2.5* 2.8*   HGB 8.4* 8.1* 8.4*   HCT 26.7* 25.0* 26.4*   PLT 88* 77* 85*     Chemistry:  Recent Labs     06/15/19  0559 06/16/19  0618 06/16/19  1516 06/17/19  0551    142  --  143   K 5.9* 5.7*  --  5.3   * 111*  --  112*   CO2 21 22  --  21   GLUCOSE 31* 79  --  88   BUN 53* 41*  --  32*   CREATININE 1.39* 1.32*  --  1.19   MG  --   --  2.0 1.9   CALCIUM 9.2 9.3  --  9.4     Recent Labs     06/16/19  1516 06/17/19  0551   PROT  --  6.8   LABALBU  --  3.0*   AST  --  19   ALT  --  12   AMMONIA 22  --    TRIG  --  115       No results found for: PHENYTOIN, PHENYTOIN, VALPROATE, CBMZ    EEG 5/7/2019: Significantly abnormal with diffuse slowing of background activity in theta frequencies consistent with underlying diffuse cortical disease seen in patients with baseline dementia. The study did not demonstrate any ongoing electrographic seizure activity. Vitamin B12 (5/6/19): 985  Folate (5/6/19): 7.9     TSH (6/14/19): elevated at 8.79    T4, free (6/14/2019): nl at 0.97     CT head 4/28/2019: No acute intracranial abnormality. Ammonia 6/13/2019: 28    Magnesium 6/17/2019: 1.9 (1.6-2.6)    Prolactin (6/16/19): 4.98 (4-15)            Impression and Plan: Mr. Naoma Schaumann. is a 78 y.o. male with   Acute encephalopathy of unclear etiology; possible urosepsis, on IV vancomycin. Baseline cognitive impairment  Slowly improving mentation. Possible drug-induced lethargy; will continue holding mirtazapine. EEG done today reviewed and it demonstrated diffusely slow background with superimposed muscle artifacts. During drowsiness no ongoing electrographic seizure activity noted. Of note; MRI brain attempted yesterday and could not be done as he does not sit still even after giving Lorazepam injn. Since he is getting much more alert and awake; will try again. Prolactin level, magnesium level, ammonia level are within normal limits. Recent hosp (4/28/19) for  left hip fracture-subcapital femoral fracture; s/p repair 5/2/2019  Hx of seizure-like activity (May 2019) with unremarkable EEG; no witnessed seizure activity since then. Discussed with patient's Nurse. No family members at bedside. Will follow with you.

## 2019-06-17 NOTE — PROGRESS NOTES
face        Fine Motor: RUE fair, LUE with increased tone and decreased coordination, fisted at times, can be PROM extended  Type of ROM/Therapeutic Exercise  Type of ROM/Therapeutic Exercise: AROM;AAROM;PROM  Comment: BUE  Exercises  Shoulder Flexion: x  Elbow Flexion: x  Elbow Extension: x  Finger Flexion: x  Finger Extension: x                          Assessment     Activity Tolerance: Treatment limited secondary to decreased cognition                Patient Education:  Patient Education: apply lotion to hands, pt unable to understand  Learner:patient  Method: demonstration and explanation       Outcome:  No response     Plan   continue POC      Goals  Short term goals  Time Frame for Short term goals: By discharge  Short term goal 1: Pt will follow one-step commands 25% of the time with repetition as needed to facilitate engagement in self-care. Short term goal 2: Pt will actively participate in 8+ minutes of self-care with Max VCs as needed. Short term goal 3: Pt will perform bed mobility with Max A x 1 to sit EOB for 8+ minutes with Min A for sitting balance for increased participation in self-care. Short term goal 4: Pt will perform sit to stand transfer with Mod A x 2 and Lashae Carcamo for increased participation in mobility during self-care. Short term goal 5: Pt will perform UB ADLs with Mod A and Max VCs.     OT Individual Minutes  Time In: 5317  Time Out: 9632  Minutes: 14      Electronically signed by Michael Newton OT on 6/17/19 at 4:47 PM

## 2019-06-17 NOTE — PROGRESS NOTES
Physical Therapy  [facility]: FLORES MEYERS  Physical Therapy Progress Note    Date: 19  Patient Name: Rosalina Baltazar. Room:   MRN: [id number]   Account: [de-identified]   : 1940  (75 y.o.) Gender: male     Referring Practitioner: Dr. Estrella Brenner  Diagnosis: UTI  Past Medical History:  has a past medical history of Arthritis, Dementia, Hypertension, and Spinal stenosis. Past Surgical History:   has a past surgical history that includes Rotator cuff repair (Left); Appendectomy; Colonoscopy; Endoscopy, colon, diagnostic; eye surgery; Partial hip arthroplasty (Left, 2019); and hip pinning (Left, 2019). Overall Orientation Status: Impaired  Restrictions/Precautions  Restrictions/Precautions: Fall Risk(LLE WBAT)  Implants present? : Metal implants(L hip hemiarthroplasty 19)       Comments: Pt non-verbal for the most part. Did state that he did not want to tap the balloon. Oxygen Therapy  O2 Device: None (Room air)          Bed Mobility:   Bed Mobility  Rolling: Dependent/Total;Rolling Left  Supine to Sit: Moderate assistance(getting (B) heels off EOB)  Sit to Supine: Moderate assistance(assist with getting (B) LE's up into bed)  Scooting: Dependent/Total(to EOB)  Comment: Pt only required Mod A with sit<>sup transfer partially due to flexion contractures. Bed mobility  Scooting: Dependent/Total(to EOB)       BALANCE Sitting - Static: Fair(at EOB unsupported)    EXERCISES    Other exercises?: Yes  Other exercises 1: (B) LE PROM/stretches to ankles and HS  Other exercises 2: sitting bal/gosia at EOB x 25min total-CGA/SBA while seated           Activity Tolerance: Treatment limited secondary to agitation     Other Comments  Comments: Pt not able to follow 1 step commands. Pt reaching and grasping at objects in air and at therapist hand. Occasionally made a stirring motion.     Current Treatment Recommendations: Balance Training, Functional Mobility Training, Endurance Training    Conditions Requiring Skilled Therapeutic Intervention  Body structures, Functions, Activity limitations: Decreased functional mobility ; Decreased cognition;Decreased high-level IADLs;Decreased balance;Decreased safe awareness  Assessment: Impaired mobility due to decreased cognition and safety issues  REQUIRES PT FOLLOW UP: Yes  Discharge Recommendations: Subacute/Skilled Nursing Facility    Goals  Short term goals  Time Frame for Short term goals: 4-5 days  Short term goal 1: bed mobility with modA of 1-2  Short term goal 2: sitting bedside with Adela       06/17/19 1626   PT Individual Minutes   Time In 1444   Time Out 1517   Minutes 33       Electronically signed by Elliot Ray on 6/17/19 at 4:35 PM

## 2019-06-17 NOTE — PROGRESS NOTES
muscle loss    Objective Information:  · Nutrition-Focused Physical Findings: Altered mental status  · Current Nutrition Therapies:  · Oral Diet Orders: NPO   · Oral Diet intake: NPO  · Parenteral Nutrition Orders:  · Type and Formula: Premix Peripheral, 2-in-1 Custom   · Lipids: 250ml, Daily  · Rate/Volume: 75 mL/hr; 1800 mL total volume  · Duration: Continuous  · Current PN Order Provides: 1418 kcal and 77 gm of protein (lipids included)  · Goal PN Orders Provides: 7286-0905 kcal and 83-93 gm of protein  · Anthropometric Measures:  · Ht: 5' 4\" (162.6 cm)   · Current Body Wt: 147 lb (66.7 kg)  · Admission Body Wt: 147 lb (66.7 kg)(estimated)  · Ideal Body Wt: 130 lb (59 kg), % Ideal Body 113%  · BMI Classification: BMI 25.0 - 29.9 Overweight    Nutrition Interventions:   Modify current Parenteral Nutrition, Continue NPO  Continued Inpatient Monitoring    Nutrition Evaluation:   · Evaluation: Progressing toward goals   · Goals: Meet greater than 75% of estimated nutrition needs   · Monitoring: Nutrition Progression, Weight, Pertinent Labs, Monitor Bowel Function, I&O, Skin Integrity, Wound Healing, Mental Status/Confusion      Richard BROOKS RKYLEIGH, L.D,  Clinical Dietitian  Cell # [phone]  Office # [phone]

## 2019-06-17 NOTE — PLAN OF CARE
Problem: Falls - Risk of:  Goal: Will remain free from falls  Description  Will remain free from falls  Outcome: Ongoing  Note:   Patient remains free of incidence/ injury. Bed remains in low position. Bed alarm on. Telesitter in use. Problem: Risk for Impaired Skin Integrity  Goal: Tissue integrity - skin and mucous membranes  Description  Structural intactness and normal physiological function of skin and  mucous membranes. Note:   No new occurrence of skin breakdown noted during this shift.

## 2019-06-17 NOTE — FLOWSHEET NOTE
Patient had telesitter, no family present;     06/17/19 9811   Encounter Summary   Services provided to: Patient   Referral/Consult From: Rounding   Continue Visiting   (6/17/19)   Complexity of Encounter Low   Length of Encounter 15 minutes   Spiritual Assessment Completed Yes   Routine   Type Initial   Assessment Approachable   Intervention [place]   Outcome Refused/declined

## 2019-06-17 NOTE — H&P
[facility]     Progress note            Date:   6/17/2019  Patientname:  Rosalinda Grove. Date of admission:  6/13/2019  4:45 PM  MRN:   [id number]  Account:  [de-identified]  YOB: 1940  PCP:    Curtis Cagle MD  Room:   2068/2068-01  Code Status:    DNR-CC    CHIEF COMPLAINT     Chief Complaint   Patient presents with    Altered Mental Status     HISTORY OF PRESENT ILLNESS  (Character, Onset, Location, Duration,  Exacerbating/RelievingFactors, Radiation,   Associated Symptoms, Severity )      The patient is a 78 y.o. -American male with history of CKD stage III, metabolic encephalopathy, cognitive impairment, and urinary retention, who presents from [facility] with altered mental status. At time of exam, patient is able to state his name, which is apparently his baseline; unable to provide any input into HPI. According to ED report, patient has been less responsive with behavioral changes noted over the past 2 days. Lab work at the [facility] revealed hyperkalemia, Kayexalate was administered, causing elevation of creatinine level. There are no known associated symptoms. Denies all complaints, though unsure of accuracy due to AMS. Denies fever, chills, chest pain, cough, abdominal pain, nausea, vomiting, diarrhea, and urinary symptoms. There are no known aggravating or alleviating factors. Symptoms are reported as being constant and severe. 6/15   poorly responsive   not swallowing   bs low cr betetr k up      6/16   k 5.7   cr 1.3    More alert    6/17/2019  K+5.3  Appears alert this AM  Cr 1.19  Denies pain    PAST MEDICAL HISTORY   Patient  has a past medical history of Arthritis, Dementia, Hypertension, and Spinal stenosis. PAST SURGICAL HISTORY    Patient  has a past surgical history that includes Rotator cuff repair (Left);  Appendectomy; Colonoscopy; Endoscopy, colon, diagnostic; eye surgery; Partial hip arthroplasty (Left, 05/02/2019); and hip pinning (Left, 5/2/2019). FAMILY HISTORY    Patient family history is not on file. SOCIAL HISTORY    Patient  reports that he has quit smoking. He has never used smokeless tobacco. He reports that he does not drink alcohol or use drugs. HOME MEDICATIONS        Prior to Admission medications    Medication Sig Start Date End Date Taking? Authorizing Provider   metoprolol tartrate (LOPRESSOR) 25 MG tablet Take 1 tablet by mouth 2 times daily 5/7/19  Yes Beverley White MD   acetaminophen (TYLENOL) 325 MG tablet Take 650 mg by mouth every 4 hours as needed for Pain   Yes Historical Provider, MD   aspirin 81 MG chewable tablet Take 81 mg by mouth daily    Yes Historical Provider, MD   mirtazapine (REMERON) 30 MG tablet Take 30 mg by mouth nightly   Yes Historical Provider, MD   vitamin E 400 UNIT capsule Take 400 Units by mouth daily   Yes Historical Provider, MD   docusate sodium (COLACE) 100 MG capsule Take 100 mg by mouth nightly   Yes Historical Provider, MD   tamsulosin (FLOMAX) 0.4 MG capsule Take 0.4 mg by mouth daily   Yes Historical Provider, MD       ALLERGIES      Lisinopril and Peanut-containing drug products    REVIEW OF SYSTEMS     Review of Systems   Unable to perform ROS: Mental status change     PHYSICAL EXAM      BP (!) 174/54   Pulse 62   Temp 98.7 °F (37.1 °C) (Oral)   Resp 14   Ht 5' 4\" (1.626 m)   Wt 147 lb (66.7 kg)   SpO2 100%   BMI 25.23 kg/m²  Body mass index is 25.23 kg/m². Physical Exam   Constitutional: He appears well-developed. No distress. Cachectic and sickly in appearance. HENT:   Head: Normocephalic and atraumatic. Mouth/Throat: Oropharynx is clear and moist.   Eyes: Conjunctivae and EOM are normal.   Cardiovascular: Normal rate, regular rhythm, normal heart sounds and intact distal pulses. Exam reveals no gallop and no friction rub. No murmur heard. Pulmonary/Chest: Effort normal and breath sounds normal. No respiratory distress. He has no wheezes.  He has no rales. He exhibits no tenderness. Abdominal: Soft. Bowel sounds are normal. He exhibits no distension. There is no tenderness. There is no guarding. Musculoskeletal: He exhibits no edema or tenderness. Neurological: He is alert. He displays no seizure activity. Skin: Skin is warm and dry. No rash noted. He is not diaphoretic. No erythema. No pallor. Psychiatric: He has a normal mood and affect. DIAGNOSTICS      EKG:   EKG 12 Lead [[id number]] Collected: 06/13/19 1704   Updated: 06/13/19 1714     Ventricular Rate 68 BPM    Atrial Rate 68 BPM    P-R Interval 176 ms    QRS Duration 84 ms    Q-T Interval 414 ms    QTc Calculation (Bazett) 440 ms    P Axis 48 degrees    R Axis 55 degrees    T Axis 29 degrees   Narrative:     *Poor data quality, interpretation may be adversely affected  Normal sinus rhythm  Nonspecific ST and T wave abnormality  Abnormal ECG  No previous ECGs available       Labs:  CBC:   Recent Labs     06/15/19  0559 06/16/19 0618 06/17/19  0551   WBC 2.9* 2.5* 2.8*   HGB 8.4* 8.1* 8.4*   PLT 88* 77* 85*     BMP:    Recent Labs     06/15/19  0559 06/16/19  0618 06/17/19  0551    142 143   K 5.9* 5.7* 5.3   * 111* 112*   CO2 21 22 21   BUN 53* 41* 32*   CREATININE 1.39* 1.32* 1.19   GLUCOSE 31* 79 88     S. Calcium:  Recent Labs     06/17/19  0551   CALCIUM 9.4     S. Ionized Calcium:No results for input(s): IONCA in the last 72 hours. S. Magnesium:  Recent Labs     06/17/19  0551   MG 1.9     S. Phosphorus:  Recent Labs     06/17/19  0551   PHOS 3.5     S. Glucose:  Recent Labs     06/16/19  2013 06/17/19  0154 06/17/19  0538   POCGLU 85 102 102     Glycosylated hemoglobin A1C: No results found for: LABA1C  Hepatic:   Recent Labs     06/17/19  0551   AST 19   ALT 12   ALKPHOS 88     CARDIAC ENZY:   No results for input(s): CKTOTAL, CKMB, CKMBINDEX, TROPHS, MYOGLOBIN in the last 72 hours. INR:   No results for input(s): INR in the last 72 hours.   BNP: No results for input(s): PROBNP in the last 72 hours. ABGs: No results for input(s): PH, PCO2, PO2, HCO3, O2SAT in the last 72 hours. Lipids:   Recent Labs     06/17/19  0551   TRIG 115     Pancreatic functions:  No results for input(s): LIPASE, AMYLASE in the last 72 hours. Nicolás [place]:   No results for input(s): LACTA in the last 72 hours. Thyroid functions:   Lab Results   Component Value Date    TSH 8.79 06/14/2019      U/A:  No results for input(s): NITRITE, COLORU, WBCUA, RBCUA, MUCUS, BACTERIA, CLARITYU, SPECGRAV, LEUKOCYTESUR, BLOODU, GLUCOSEU, AMORPHOUS in the last 72 hours. Invalid input(s): Valarie Pallas    Imaging/Diagonstics:     Xr Chest Portable    Result Date: 6/13/2019  EXAMINATION: ONE XRAY VIEW OF THE CHEST 6/13/2019 5:09 pm COMPARISON: December 18, 2018 HISTORY: ORDERING SYSTEM PROVIDED HISTORY: Chest Pain TECHNOLOGIST PROVIDED HISTORY: Chest Pain FINDINGS: New gas-filled colon beneath the right hemidiaphragm. Lungs are under aerated but essentially clear. Heart and mediastinum normal.  Bony thorax intact. 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 *    Plan:     Altered Mental Status  -Ammonia 28  -ED work-up reveals UTI    Urinary tract infection  -Urine culture c0ag negative staph  - on vanco    Troponin elevation  -Trop HS 88, then 81  -most likely related to elevated creat  -Denies chest pain; EKG nonacute    MAYITO superimposed on CKD, improved  -Creat 1.9, then 1.72; now at baseline near 1.2  history of stage 3 kidney disease  -NS fluid bolus in ED  -continue IVF on admission  -hold diuretics/nephrotoxic medications  -monitor BMP     6/15     ms    poorly responsive   ? Baseline   bs low    Change ivf to d5 ns   cr better   k 5.7    Rectal kayexalate      neurolgy to see    uti    cont rocephin   no focal  Signs    neurolgy  To see   tsh nl    bp ok           6/16   betetr   ms   k 5.7   kayexalate    cr 1.3 better   bp  Elevated   hr 57   d/c lopressor   start norvasc   d/c pklanning   cont rocephin  For uti and ivf   pt   d/c ecf    in am     6/17  K+ improved to 5.3 post kayexlate  Cr improved to baseline 1.2  BP elevated, started on norvasc however patient NPO  Speech to reassess PO trials today, more alert  Can give hydralazine PRN, HR 62  Allergy to lisinopril  MRI and EEG ordered and awaited per neurology  Continue Vancomycin for UTI  DC planning    Felicitas Blair MD   6/17/2019  8:51 AM      Attending Physician Statement  I Independently seen and examined the patient. I have discussed the care of the patient, including pertinent history and exam findings,  with the resident. I have reviewed the key elements of all parts of the encounter with the resident. I agree with the assessment, plan and orders as documented by the resident.     Braydon Russell

## 2019-06-17 NOTE — CARE COORDINATION
DISCHARGE PLANNING NOTE:    LSW following for return to [facility] on discharge (new pre-cert needed). Active order for IV Vanco and PPN. Will continue to follow along for additional d/c needs.     Electronically signed by Latha Rosado RN on 6/17/2019 at 12:33 PM

## 2019-06-18 ENCOUNTER — APPOINTMENT (OUTPATIENT)
Dept: MRI IMAGING | Age: 79
DRG: 690 | End: 2019-06-18
Payer: MEDICARE

## 2019-06-18 VITALS
OXYGEN SATURATION: 98 % | WEIGHT: 148.15 LBS | DIASTOLIC BLOOD PRESSURE: 90 MMHG | HEART RATE: 72 BPM | HEIGHT: 64 IN | BODY MASS INDEX: 25.29 KG/M2 | RESPIRATION RATE: 16 BRPM | SYSTOLIC BLOOD PRESSURE: 176 MMHG | TEMPERATURE: 95.7 F

## 2019-06-18 LAB
-: NORMAL
ANION GAP SERPL CALCULATED.3IONS-SCNC: 11 MMOL/L (ref 9–17)
BUN BLDV-MCNC: 33 MG/DL (ref 8–23)
BUN/CREAT BLD: ABNORMAL (ref 9–20)
CALCIUM SERPL-MCNC: 9.5 MG/DL (ref 8.6–10.4)
CHLORIDE BLD-SCNC: 108 MMOL/L (ref 98–107)
CO2: 20 MMOL/L (ref 20–31)
CREAT SERPL-MCNC: 1.05 MG/DL (ref 0.7–1.2)
GFR AFRICAN AMERICAN: >60 ML/MIN
GFR NON-AFRICAN AMERICAN: >60 ML/MIN
GFR SERPL CREATININE-BSD FRML MDRD: ABNORMAL ML/MIN/{1.73_M2}
GFR SERPL CREATININE-BSD FRML MDRD: ABNORMAL ML/MIN/{1.73_M2}
GLUCOSE BLD-MCNC: 77 MG/DL (ref 75–110)
GLUCOSE BLD-MCNC: 79 MG/DL (ref 75–110)
GLUCOSE BLD-MCNC: 87 MG/DL (ref 70–99)
GLUCOSE BLD-MCNC: 98 MG/DL (ref 75–110)
HCT VFR BLD CALC: 26.3 % (ref 41–53)
HEMOGLOBIN: 8.7 G/DL (ref 13.5–17.5)
MAGNESIUM: 1.9 MG/DL (ref 1.6–2.6)
MCH RBC QN AUTO: 27.7 PG (ref 26–34)
MCHC RBC AUTO-ENTMCNC: 33.1 G/DL (ref 31–37)
MCV RBC AUTO: 83.9 FL (ref 80–100)
NRBC AUTOMATED: ABNORMAL PER 100 WBC
PDW BLD-RTO: 17.8 % (ref 11.5–14.9)
PHOSPHORUS: 3.4 MG/DL (ref 2.5–4.5)
PLATELET # BLD: 88 K/UL (ref 150–450)
PMV BLD AUTO: 7.5 FL (ref 6–12)
POTASSIUM SERPL-SCNC: 4.7 MMOL/L (ref 3.7–5.3)
RBC # BLD: 3.14 M/UL (ref 4.5–5.9)
REASON FOR REJECTION: NORMAL
SODIUM BLD-SCNC: 139 MMOL/L (ref 135–144)
WBC # BLD: 2.9 K/UL (ref 3.5–11)
ZZ NTE CLEAN UP: ORDERED TEST: NORMAL
ZZ NTE WITH NAME CLEAN UP: SPECIMEN SOURCE: NORMAL

## 2019-06-18 PROCEDURE — 82947 ASSAY GLUCOSE BLOOD QUANT: CPT

## 2019-06-18 PROCEDURE — 85027 COMPLETE CBC AUTOMATED: CPT

## 2019-06-18 PROCEDURE — 99232 SBSQ HOSP IP/OBS MODERATE 35: CPT | Performed by: PSYCHIATRY & NEUROLOGY

## 2019-06-18 PROCEDURE — 84100 ASSAY OF PHOSPHORUS: CPT

## 2019-06-18 PROCEDURE — 6360000002 HC RX W HCPCS: Performed by: INTERNAL MEDICINE

## 2019-06-18 PROCEDURE — 83735 ASSAY OF MAGNESIUM: CPT

## 2019-06-18 PROCEDURE — 2580000003 HC RX 258: Performed by: INTERNAL MEDICINE

## 2019-06-18 PROCEDURE — 80048 BASIC METABOLIC PNL TOTAL CA: CPT

## 2019-06-18 PROCEDURE — 99239 HOSP IP/OBS DSCHRG MGMT >30: CPT | Performed by: INTERNAL MEDICINE

## 2019-06-18 PROCEDURE — 36415 COLL VENOUS BLD VENIPUNCTURE: CPT

## 2019-06-18 PROCEDURE — 2500000003 HC RX 250 WO HCPCS: Performed by: INTERNAL MEDICINE

## 2019-06-18 RX ADMIN — HYDRALAZINE HYDROCHLORIDE 10 MG: 20 INJECTION, SOLUTION INTRAMUSCULAR; INTRAVENOUS at 12:03

## 2019-06-18 RX ADMIN — CALCIUM GLUCONATE: 94 INJECTION, SOLUTION INTRAVENOUS at 17:20

## 2019-06-18 RX ADMIN — VANCOMYCIN HYDROCHLORIDE 1000 MG: 1 INJECTION, POWDER, LYOPHILIZED, FOR SOLUTION INTRAVENOUS at 17:21

## 2019-06-18 RX ADMIN — I.V. FAT EMULSION 250 ML: 20 EMULSION INTRAVENOUS at 17:20

## 2019-06-18 RX ADMIN — DEXTROSE AND SODIUM CHLORIDE: 5; 450 INJECTION, SOLUTION INTRAVENOUS at 06:00

## 2019-06-18 NOTE — H&P
05/02/2019); and hip pinning (Left, 5/2/2019). FAMILY HISTORY    Patient family history is not on file. SOCIAL HISTORY    Patient  reports that he has quit smoking. He has never used smokeless tobacco. He reports that he does not drink alcohol or use drugs. HOME MEDICATIONS        Prior to Admission medications    Medication Sig Start Date End Date Taking? Authorizing Provider   metoprolol tartrate (LOPRESSOR) 25 MG tablet Take 1 tablet by mouth 2 times daily 5/7/19  Yes Omari Guidry MD   acetaminophen (TYLENOL) 325 MG tablet Take 650 mg by mouth every 4 hours as needed for Pain   Yes Historical Provider, MD   aspirin 81 MG chewable tablet Take 81 mg by mouth daily    Yes Historical Provider, MD   mirtazapine (REMERON) 30 MG tablet Take 30 mg by mouth nightly   Yes Historical Provider, MD   vitamin E 400 UNIT capsule Take 400 Units by mouth daily   Yes Historical Provider, MD   docusate sodium (COLACE) 100 MG capsule Take 100 mg by mouth nightly   Yes Historical Provider, MD   tamsulosin (FLOMAX) 0.4 MG capsule Take 0.4 mg by mouth daily   Yes Historical Provider, MD       ALLERGIES      Lisinopril and Peanut-containing drug products    REVIEW OF SYSTEMS     Review of Systems   Unable to perform ROS: Mental status change     PHYSICAL EXAM      BP (!) 182/87   Pulse 67   Temp 98.7 °F (37.1 °C) (Oral)   Resp 20   Ht 5' 4\" (1.626 m)   Wt 148 lb 2.4 oz (67.2 kg)   SpO2 100%   BMI 25.43 kg/m²  Body mass index is 25.43 kg/m². Physical Exam   Constitutional: He appears well-developed. No distress. Cachectic and sickly in appearance. HENT:   Head: Normocephalic and atraumatic. Mouth/Throat: Oropharynx is clear and moist.   Eyes: Conjunctivae and EOM are normal.   Cardiovascular: Normal rate, regular rhythm, normal heart sounds and intact distal pulses. Exam reveals no gallop and no friction rub. No murmur heard.   Pulmonary/Chest: Effort normal and breath sounds normal. No respiratory distress. He has no wheezes. He has no rales. He exhibits no tenderness. Abdominal: Soft. Bowel sounds are normal. He exhibits no distension. There is no tenderness. There is no guarding. Musculoskeletal: He exhibits no edema or tenderness. Neurological: He is alert. He displays no seizure activity. Skin: Skin is warm and dry. No rash noted. He is not diaphoretic. No erythema. No pallor. Psychiatric: He has a normal mood and affect. DIAGNOSTICS      EKG:   EKG 12 Lead [[id number]] Collected: 06/13/19 1704   Updated: 06/13/19 1714     Ventricular Rate 68 BPM    Atrial Rate 68 BPM    P-R Interval 176 ms    QRS Duration 84 ms    Q-T Interval 414 ms    QTc Calculation (Bazett) 440 ms    P Axis 48 degrees    R Axis 55 degrees    T Axis 29 degrees   Narrative:     *Poor data quality, interpretation may be adversely affected  Normal sinus rhythm  Nonspecific ST and T wave abnormality  Abnormal ECG  No previous ECGs available       Labs:  CBC:   Recent Labs     06/16/19  0618 06/17/19  0551 06/18/19  0627   WBC 2.5* 2.8* 2.9*   HGB 8.1* 8.4* 8.7*   PLT 77* 85* 88*     BMP:    Recent Labs     06/16/19  0618 06/17/19  0551 06/18/19  0715    143 139   K 5.7* 5.3 4.7   * 112* 108*   CO2 22 21 20   BUN 41* 32* 33*   CREATININE 1.32* 1.19 1.05   GLUCOSE 79 88 87     S. Calcium:  Recent Labs     06/18/19  0715   CALCIUM 9.5     S. Ionized Calcium:No results for input(s): IONCA in the last 72 hours. S. Magnesium:  Recent Labs     06/18/19  0715   MG 1.9     S. Phosphorus:  Recent Labs     06/18/19  0715   PHOS 3.4     S. Glucose:  Recent Labs     06/17/19  1950 06/18/19  0142 06/18/19  0613   POCGLU 74* 79 77     Glycosylated hemoglobin A1C: No results found for: LABA1C  Hepatic:   Recent Labs     06/17/19  0551   AST 19   ALT 12   ALKPHOS 88     CARDIAC ENZY:   No results for input(s): CKTOTAL, CKMB, CKMBINDEX, TROPHS, MYOGLOBIN in the last 72 hours.   INR:   No results for input(s): INR in the last 72 hours.  BNP: No results for input(s): PROBNP in the last 72 hours. ABGs: No results for input(s): PH, PCO2, PO2, HCO3, O2SAT in the last 72 hours. Lipids:   Recent Labs     06/17/19  0551   TRIG 115     Pancreatic functions:  No results for input(s): LIPASE, AMYLASE in the last 72 hours. Lamount Peaches:   No results for input(s): LACTA in the last 72 hours. Thyroid functions:   Lab Results   Component Value Date    TSH 8.79 06/14/2019      U/A:  No results for input(s): NITRITE, COLORU, WBCUA, RBCUA, MUCUS, BACTERIA, CLARITYU, SPECGRAV, LEUKOCYTESUR, BLOODU, GLUCOSEU, AMORPHOUS in the last 72 hours. Invalid input(s): Silvio Carrasco    Imaging/Diagonstics:     Xr Chest Portable    Result Date: 6/13/2019  EXAMINATION: ONE XRAY VIEW OF THE CHEST 6/13/2019 5:09 pm COMPARISON: December 18, 2018 HISTORY: ORDERING SYSTEM PROVIDED HISTORY: Chest Pain TECHNOLOGIST PROVIDED HISTORY: Chest Pain FINDINGS: New gas-filled colon beneath the right hemidiaphragm. Lungs are under aerated but essentially clear. Heart and mediastinum normal.  Bony thorax intact. 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 *    Plan:     Altered Mental Status  -Ammonia 28  -ED work-up reveals UTI    Urinary tract infection  -Urine culture c0ag negative staph  - on vanco    Troponin elevation  -Trop HS 88, then 81  -most likely related to elevated creat  -Denies chest pain; EKG nonacute    MAYITO superimposed on CKD, improved  -Creat 1.9, then 1.72; now at baseline near 1.2  history of stage 3 kidney disease  -NS fluid bolus in ED  -continue IVF on admission  -hold diuretics/nephrotoxic medications  -monitor BMP     6/15     ms    poorly responsive   ?  Baseline   bs low    Change ivf to d5 ns   cr better   k 5.7    Rectal kayexalate      neurolgy to see    uti    cont rocephin   no focal  Signs    neurolgy  To see   tsh nl    bp ok           6/16   betetr   ms   k 5.7   kayexalate    cr 1.3 better   bp  Elevated   hr 57   d/c lopressor   start norvasc   d/c pklanning   cont rocephin  For uti and ivf   pt   d/c ecf    in am     6/17  K+ improved to 5.3 post kayexlate  Cr improved to baseline 1.2  BP elevated, started on norvasc however patient NPO  Speech to reassess PO trials today, more alert  Can give hydralazine PRN, HR 62  Allergy to lisinopril  MRI and EEG ordered and awaited per neurology  Continue Vancomycin for UTI  DC planning       6/18     ms better   unable to do eeg or mri        neuro eval awaited   hospice   on ppn    k  Better   staph uti   on vanco    bp  Still high    cr better      consult gi    for peg   cont hudralazine prn for bp    Umer Hernandez MD   6/18/2019  11:14 AM

## 2019-06-18 NOTE — PLAN OF CARE
Problem: Falls - Risk of:  Goal: Will remain free from falls  Description  Will remain free from falls  Outcome: Met This Shift  Pt. Free of falls and injuries this shift. Problem: Falls - Risk of:  Goal: Absence of physical injury  Description  Absence of physical injury  Outcome: Met This Shift   No injuries this shift. Patient's safety maintained. Problem: Risk for Impaired Skin Integrity  Goal: Tissue integrity - skin and mucous membranes  Description  Structural intactness and normal physiological function of skin and  mucous membranes. Outcome: Ongoing  Skin assessment as charted. No evidence of new skin breakdown. Problem: Musculor/Skeletal Functional Status  Goal: Highest potential functional level  Outcome: Ongoing  Patient turns self in bed. Patient able to make small body adjustments. Problem: Musculor/Skeletal Functional Status  Goal: Absence of falls  Outcome: Met This Shift  Pt. Free of falls and injuries this shift. Problem: Nutrition  Goal: Optimal nutrition therapy  Outcome: Ongoing  Patient receiving PPN. Patient due for swallow study tomorrow.

## 2019-06-18 NOTE — PROGRESS NOTES
Spoke with Dr. Martha Matamoros regarding DNR-CC status. Notified him that Adela Medina has already established [facility] and wants hospice. Orders received for discharge order and DNR-CC. Verified with Montse Greenberg RN. Transportation set up to go to hospice @ 1930.

## 2019-06-18 NOTE — PROGRESS NOTES
NEUROLOGY INPATIENT PROGRESS NOTE    6/18/2019         Mick Humphries is a  78 y.o. male admitted on 6/13/2019 with  UTI (urinary tract infection) [N39.0]  Chart reviewed. Discussed with caregivers. It is much more alert. Patient's nephew, Fabian Santamaria at bedside. Discussion done about testing done so far. Discussion also done about possible spinal tap and inability to do MRI for further evaluation of acute encephalopathy. Patient's nephew, Fabian Santamaria did not want any further interventions. Patient's nephew also stated that they decided comfort care only and patient is going into hospice. Briefly, this is a  78 y.o. male admitted on 6/13/2019 with altered mentation. As per nursing home patient is normally alert and oriented x2 but he has been less responsive for the past 2 days. Patient is not answering any questions. Contacted the nursing staff at [address]. As per functional status; patient was alert and oriented to self and place usually. He is dependent on some of ADLs and he does get around using a wheelchair. But for the past few days he had slowly progressive change in his mentation. Vitals on admit: 148/68, 67/min, 96.3 °F.  Since admitted to [facility]; patient remained nonverbal even though alert and oriented to name call. No witnessed seizure activity. Caregivers also stated that the patient has been pocketing and he was kept n.p.o. and on IV fluids. He was hypoglycemic earlier. Of note; he was recently hospitalized at [facility] on 4/28/2019 after mechanical fall found to have left hip fracture-subcapital femoral fracture for which he underwent surgical intervention on 5/2/2019. At that time he had possible seizure-like activity with absence of postictal state. His CT head on 4/28/2019 was unremarkable. His EEG was abnormal with diffuse encephalopathy but without any epileptiform discharges.   His neurologic examination was significant for cognitive impairment of and GAIT  Not tested         Data:    Lab Results:     Lab Results   Component Value Date    TRIG 115 06/17/2019    ALT 12 06/17/2019    AST 19 06/17/2019    TSH 8.79 (H) 06/14/2019    INR 1.0 06/14/2019    [id number] 985 05/06/2019     Hematology:  Recent Labs     06/16/19  0618 06/17/19  0551 06/18/19  0627   WBC 2.5* 2.8* 2.9*   HGB 8.1* 8.4* 8.7*   HCT 25.0* 26.4* 26.3*   PLT 77* 85* 88*     Chemistry:  Recent Labs     06/16/19  0618 06/16/19  1516 06/17/19  0551 06/18/19  0715     --  143 139   K 5.7*  --  5.3 4.7   *  --  112* 108*   CO2 22  --  21 20   GLUCOSE 79  --  88 87   BUN 41*  --  32* 33*   CREATININE 1.32*  --  1.19 1.05   MG  --  2.0 1.9 1.9   CALCIUM 9.3  --  9.4 9.5     Recent Labs     06/16/19  1516 06/17/19  0551   PROT  --  6.8   LABALBU  --  3.0*   AST  --  19   ALT  --  12   AMMONIA 22  --    TRIG  --  115       No results found for: PHENYTOIN, PHENYTOIN, VALPROATE, CBMZ    EEG 5/7/2019: Significantly abnormal with diffuse slowing of background activity in theta frequencies consistent with underlying diffuse cortical disease seen in patients with baseline dementia. The study did not demonstrate any ongoing electrographic seizure activity. Vitamin B12 (5/6/19): 985  Folate (5/6/19): 7.9     TSH (6/14/19): elevated at 8.79    T4, free (6/14/2019): nl at 0.97     CT head 4/28/2019: No acute intracranial abnormality. Ammonia 6/13/2019: 28    Magnesium 6/17/2019: 1.9 (1.6-2.6)    Prolactin (6/16/19): 4.98 (4-15)            Impression and Plan: Mr. Mariella Arechiga is a 78 y.o. male with   Acute encephalopathy of unclear etiology; possible urosepsis, on IV vancomycin. Baseline cognitive impairment  Slowly improving mentation. Possible drug-induced lethargy; Mirtazapine discontinued. EEG demonstrated diffusely slow background with superimposed muscle artifacts. During drowsiness no ongoing electrographic seizure activity noted.     Of note; MRI brain attempted and could not be done as he does not sit still even after giving Lorazepam injn. Prolactin level, magnesium level, ammonia level are within normal limits. Recent hosp (4/28/19) for  left hip fracture-subcapital femoral fracture; s/p repair 5/2/2019  Hx of seizure-like activity (May 2019) with unremarkable EEG; no witnessed seizure activity since then. Patient is comfort care only and being transferred to hospice care. Neurology service is signing off. Please call us again if there are any significant changes or questions.

## 2019-06-18 NOTE — PROGRESS NOTES
Nutrition Assessment (Parenteral Nutrition)    Type and Reason for Visit: Reassess    Nutrition Recommendations: Continue NPO status and Peripheral solution Clinimix 4.25/10. Maintain lipids at 250 mL per day. PN rate at 75 mL/hr. Following changes to additives: Sodium Acetate 15 to 25 mEq, Sodium Chloride 25 to 0 mEq, Potassium Acetate 0 to 15 mEq, Potassium Phosphate 0 to 10 mmol, no MVI and trace elements.      Nutrition Assessment: Patient improving from a nutritional standpoint as now stable on PPN meeting 88% of estimated caloric and 90% of protein needs with upward trend in weight and improved nutrition labs. Patient remains at risk for compromise due to severe dysphagia and patient's inability to complete swallow study. Noted feeding tube, hospice and palliative care are discussed. Awaiting family's decision on PEG tube placement, hospice and/or palliative care. Will continue PN for now and monitor nutrition progression. Malnutrition Assessment:  · Malnutrition Status: Meets the criteria for severe malnutrition  · Context: Acute illness or injury  · Findings of the 6 clinical characteristics of malnutrition (Minimum of 2 out of 6 clinical characteristics is required to make the diagnosis of moderate or severe Protein Calorie Malnutrition based on AND/ASPEN Guidelines):  1. Energy Intake-Less than or equal to 50% of estimated energy requirement, Greater than or equal to 5 days    2. Weight Loss-Unable to assess,    3. Fat Loss-Unable to assess,    4. Muscle Loss-Severe muscle mass loss, Clavicles (pectoralis and deltoids), Temples (temporalis muscle)  5. Fluid Accumulation-No significant fluid accumulation, Extremities  6.   Strength-Not measured    Nutrition Risk Level: High    Nutrient Needs:  · Estimated Daily Total Kcal: 5557-6666 kcal based on Marathon-St. Jeor with 1.2-1.3 factor (admission weight)   · Estimated Daily Protein (g): 80-93 gm of protein based on 1.2-1.4 gm/kg of admission weight    Nutrition Diagnosis:   · Problem: Inadequate oral intake  · Etiology: related to Difficulty swallowing     Signs and symptoms:  as evidenced by Swallow study results, NPO status due to medical condition, Severe muscle loss    Objective Information:  · Nutrition-Focused Physical Findings: Altered mental status  · Current Nutrition Therapies:  · Oral Diet Orders: NPO   · Oral Diet intake: NPO  · Oral Nutrition Supplement (ONS) Orders: None  · ONS intake: NPO  · Parenteral Nutrition Orders:  · Type and Formula: Premix Peripheral, 2-in-1 Custom   · Lipids: 250ml, Daily  · Rate/Volume: 75 mL/hr; 1800 mL total volume  · Duration: Continuous  · Current PN Order Provides: 1418 kcal and 77 gm of protein (lipids included)  · Goal PN Orders Provides: 8228-4513 kcal and 83-93 gm of protein  · Anthropometric Measures:  · Ht: 5' 4\" (162.6 cm)   · Current Body Wt: 148 lb 2.4 oz (67.2 kg)  · Admission Body Wt: 147 lb (66.7 kg)(estimated)  · Ideal Body Wt: 130 lb (59 kg), % Ideal Body 113%  · BMI Classification: BMI 25.0 - 29.9 Overweight    Nutrition Interventions:   Continue NPO, Modify current Parenteral Nutrition  Continued Inpatient Monitoring, Coordination of Care, Education Not Indicated    Nutrition Evaluation:   · Evaluation: Progressing toward goals   · Goals: Meet greater than 75% of estimated nutrition needs   · Monitoring: Nutrition Progression, Weight, Pertinent Labs, Monitor Bowel Function, I&O, Skin Integrity, Wound Healing, Mental Status/Confusion      Douglas Jordan RD, LD  Office phone [phone]

## 2019-06-18 NOTE — FLOWSHEET NOTE
Patient's eyes were open but he did not respond; no family present;     06/18/19 1529   Encounter Summary   Services provided to: Patient   Referral/Consult From: Palliative Care   Continue Visiting   (6/18/19)   Complexity of Encounter Low   Length of Encounter 15 minutes   Spiritual Assessment Completed Yes   Routine   Type Follow up   Spiritual/Denominational   Type Spiritual support   Assessment Approachable   Intervention Prayer;Sustaining presence/ Ministry of presence   Outcome Did not respond

## 2019-06-18 NOTE — PLAN OF CARE
Problem: Falls - Risk of:  Goal: Will remain free from falls  Description  Will remain free from falls  6/18/2019 1609 by Latha Negron RN  Outcome: Met This Shift  Note:   Pt assessed as a fall risk this shift. Remains free from falls and accidental injury at this time. Fall precautions in place, including falling star sign and fall risk band on pt. Floor free from obstacles, and bed is locked and in lowest position. Adequate lighting provided. Pt encouraged to call before getting OOB for any need. Bed alarm activated. Will continue to monitor needs during hourly rounding, and reinforce education on use of call light. 6/18/2019 0344 by Gustavo Awad RN  Outcome: Met This Shift  Goal: Absence of physical injury  Description  Absence of physical injury  6/18/2019 0344 by Gustavo Awad RN  Outcome: Met This Shift     Problem: Musculor/Skeletal Functional Status  Goal: Absence of falls  6/18/2019 0344 by Gustavo Awad RN  Outcome: Met This Shift     Problem: Risk for Impaired Skin Integrity  Goal: Tissue integrity - skin and mucous membranes  Description  Structural intactness and normal physiological function of skin and  mucous membranes. 6/18/2019 1609 by Latha Negron RN  Outcome: Ongoing  Note:   Skin assessment performed. See head to toe assessment. Will continue to monitor. 6/18/2019 0344 by Gustavo Awad RN  Outcome: Ongoing     Problem: Musculor/Skeletal Functional Status  Goal: Highest potential functional level  6/18/2019 1609 by Latha Negron RN  Outcome: Ongoing  6/18/2019 0344 by Gustavo Awad RN  Outcome: Ongoing     Problem: Nutrition  Goal: Optimal nutrition therapy  6/18/2019 1609 by Latha Negron RN  Outcome: Ongoing  Patient currently receiving PPN.    6/18/2019 1233 by Rhiannon Owens RD, LD  Outcome: Ongoing  6/18/2019 0344 by Gustavo Awad RN  Outcome: Ongoing

## 2019-06-18 NOTE — CONSULTS
..    Palliative Care Inpatient Consult    NAME:  Minal Dickerson. MEDICAL RECORD NUMBER:  [id number]  AGE: 78 y.o. GENDER: male  : 1940  TODAY'S DATE:  2019    Reasons for Consultation:    Provision of information regarding PC and/or hospice philosophies  Complex, time-intensive communication and interdisciplinary psychosocial support  Clarification of goals of care and/or assistance with difficult decision-making  Guidance in regards to resources and transition(s)    Members of PC team contributing to this consultation are :  Jennifer Garza R.N. History of Present Illness     The patient is a 78 y.o. Non-/non  male who presents with Altered Mental Status    Referred to Palliative Care by   [x] Physician   [] Nursing  [] Family Request   [] Other:       He was admitted to the primary service for UTI (urinary tract infection) [N39.0]. His hospital course has been associated with UTI (urinary tract infection).  The patient has a complicated medical history and has been hospitalized since 2019  4:45 PM.    Active Hospital Problems    Diagnosis Date Noted    Toxic metabolic encephalopathy [W67]     Severe malnutrition ([facility] [E43] 2019    Acute encephalopathy [G93.40]     Dehydration [E86.0]     Hypoglycemia [E16.2]     Acute renal failure superimposed on stage 3 chronic kidney disease ([address] [N17.9, N18.3] 2019    UTI (urinary tract infection) [N39.0] 2019    Stage 3 chronic kidney disease ([facility] [address] [N18.3] 2018    Retention of urine [R33.9] 2018       Data        Code Status: DNR-CC     ADVANCED CARE PLANNING:  Patient has capacity for medical decisions: no  Health Care Power of : yes  Living Will: not asked     Personal, Social, and Family History  Marital Status:   Living situation:nursing home  Jaky/Spiritual History:   Unknown   Psychological Distress: unknown   Does patient understand diagnosis/treatment? no  Does family/caregiver understand diagnosis/treatment? yes    Assessment        Palliative Performance Scale:    ___100% Full ambulation; normal activity and work; no evidence of disease; able to do own self care; normal intake; fully conscious  ___90% Full ambulation; normal activity and work; some evidence of disease; able to do own self care; normal intake; fully conscious  ___80% Full ambulation; normal activity with effort; some evidence of disease; able to do own self care; normal or reduced intake; fully conscious  ___70% Ambulation reduced; unable to perform normal job/work; significant disease; able to do own self care; normal or reduced intake; fully conscious  ___60%  Ambulation reduced; cannot do hobbies/housework; significant disease; occasional assist; intake normal or reduced; fully conscious/some confusion  ___50%  Mainly sit/lie; can't do any work; extensive disease; considerable assist; intake normal or reduced; fully conscious/some confusion  __X_40%  Mainly in bed; extensive disease; mainly assist; intake normal or reduced; fully conscious/ some confusion   ___30%  Bed bound; extensive disease; total care; intake reduced; fully conscious/some confusion  ___20%  Bed bound; extensive disease; total care; intake minimal; drowsy/coma  ___10%  Bed bound; extensive disease; total care; mouth care only; drowsy/coma  ___0       Death       Risk Assessments:    Ayden Risk Score:      Readmission Risk Score: 25        1 Year Mortality Risk Score:       Plan        Palliative Interaction:Patient is having ADL's completed. I sdi reach out to the patient's nephew Teofilo Hamm. I introduced my palliative care support role to Elliot Caban. We discuss his uncle and his life journey. The patient is , and Elliot Caban and his wife have been taking care of the patient for 3 years in his home , and the patient has no biological children. The patient actually lived with them up until a couple months ago.  Elliot Caban and his wife went on a trip and the patient was placed in respite care, and there he fell and fractured his hip. After surgery he was placed in rehab in a ECF. Nallely Fraga states that he did not do as well after surgery and had many chronic conditions as well as his dementia which seems to affect his quality of life greatly. Nallely Fraga has had many conversations with his uncle, and he is a [facility] per his wishes. We talked about the idea of comfort care or Hospice care, and Helga Reyna stated\" we were just talking about that with my sister in law who is a nurse. \"   Nallely Fraga stated that he would like to meet with hospice, and he did choose hospice of [facility] for the inpatient option. I set a meeting tonight with Hospice of [address] [place] [address] at 5:30 pm.   I did call Helga Reyna back and left a message regarding [email or web address]'s meeting time. Will follow for goals of care and support. UTI (urinary tract infection) [N39.0]    Goals of care evaluation:  The patient goals of care are provide comfort care/support/palliation/relieve suffering   Goals of care discussed with:    [] Patient independently    [] Patient and Family    [x] Family or Healthcare DPOA independently    [] Unable to discuss with patient, family/DPOA not present    Provided support for family distress    Discussed meaning and purpose  Introduced Palliative care  Provided information about Hospice  [facility]  I set a Hospice meeting for tonight at 5:30 pm with [facility] of [address] [place] [address]. I offered emotional support to the family. Will follow for goals of care and support. Code Status  DNR-CC    Other recommendations:  Please call with any palliative questions or concerns. Palliative Care Team is available via perfect serve or via phone - [phone]. Palliative Care will continue to follow Mr. Shay Wyman care as needed. Thank you for allowing Palliative Care to participate in the care of Mr. Kenyetta Morrison .     Electronically signed by   Sultana Romero RN  Palliative Care Team  on 6/18/2019 at 10:16 AM    Palliative care office: [phone]

## 2019-06-18 NOTE — PROGRESS NOTES
Pre-cert was obtained for return the [address] However, Facility cannot accept pt until feeding access is resolved. Await decision regarding hospice.

## 2019-06-18 NOTE — PROCEDURES
[address]                 [address] [facility], [facility] [address]                          ELECTROENCEPHALOGRAM REPORT    PATIENT NAME: Savanna Jolly                        :        1940  MED REC NO:   [id number]                              ROOM:       2068  ACCOUNT NO:   [de-identified]                           ADMIT DATE: 2019  PROVIDER:     Maurilio Donnelly    DATE OF EE2019    TECHNICIAN:  Jojo Alberts    HISTORY:  This is a 66-year-old male with cognitive impairment, chronic  renal disease, presented with acute encephalopathy. He is being  evaluated for possible seizures. MEDICATIONS:  Include Lopressor, Tylenol, Remeron, Colace, Flomax. DESCRIPTION OF THE PROCEDURE:  Electrodes were applied using paste in  positions dictated by the International 10-20 system of placement. Reviewing montages included both referential and bipolar derivations. In addition to EEG data, EKG and eye movements were recorded. This is a  routine recording. This test was performed on 2019. DESCRIPTION OF ACTIVITIES:  At the onset of the study, the patient is  awake and confused and uncooperative. This study is contaminated with  muscle artifacts. As the study progresses, the patient is drowsy with  attenuation of the artifacts. Background activity demonstrated  diffusely slow background in 2-3 Hz delta frequencies with superimposed  muscle artifacts. These attenuated with drowsiness. Technologist noted  that the patient has been rubbing his head and moving and having  shaking. Not cooperative for the study. Photic stimulation did not  induce posterior driving responses. Hyperventilation could not be  performed, as the patient does not follow instructions. ELECTRODIAGNOSTIC INTERPRETATION:  This study is contaminated with  muscle artifacts, making interpretation limited.   During drowsiness,  these artifacts attenuated and no ongoing

## 2019-07-14 PROBLEM — N39.0 UTI (URINARY TRACT INFECTION): Status: RESOLVED | Noted: 2019-06-13 | Resolved: 2019-07-14

## 2019-07-31 NOTE — DISCHARGE SUMMARY
Problems:    Stage 3 chronic kidney disease (HCC)    Severe malnutrition (HCC)    Retention of urine    Acute renal failure superimposed on stage 3 chronic kidney disease (HCC)    Acute encephalopathy    Dehydration    Hypoglycemia    Toxic metabolic encephalopathy  Resolved Problems:    * No resolved hospital problems. *          Altered Mental Status  -Ammonia 28  -ED work-up reveals UTI     Urinary tract infection  -Urine culture c0ag negative staph  - on vanco     Troponin elevation  -Trop HS 88, then 81  -most likely related to elevated creat  -Denies chest pain; EKG nonacute     MAYITO superimposed on CKD, improved  -Creat 1.9, then 1.72; now at baseline near 1.2  history of stage 3 kidney disease  -NS fluid bolus in ED  -continue IVF on admission  -hold diuretics/nephrotoxic medications  -monitor BMP      6/15      ms    poorly responsive   ?  Baseline   bs low    Change ivf to d5 ns   cr better   k 5.7    Rectal kayexalate       neurolgy to see    uti    cont rocephin   no focal  Signs    neurolgy  To see   tsh nl    bp ok             6/16   betetr   ms   k 5.7   kayexalate    cr 1.3 better   bp  Elevated   hr 57   d/c lopressor   start norvasc   d/c pklanning   cont rocephin  For uti and ivf   pt   d/c ecf     ignificant therapeutic interventions:     Significant Diagnostic Studies:   Labs / Micro:  CBC:   Lab Results   Component Value Date    WBC 2.9 06/18/2019    RBC 3.14 06/18/2019    HGB 8.7 06/18/2019    HCT 26.3 06/18/2019    MCV 83.9 06/18/2019    MCH 27.7 06/18/2019    MCHC 33.1 06/18/2019    RDW 17.8 06/18/2019    PLT 88 06/18/2019     BMP:    Lab Results   Component Value Date    GLUCOSE 87 06/18/2019     06/18/2019    K 4.7 06/18/2019     06/18/2019    CO2 20 06/18/2019    ANIONGAP 11 06/18/2019    BUN 33 06/18/2019    CREATININE 1.05 06/18/2019    BUNCRER NOT REPORTED 06/18/2019    CALCIUM 9.5 06/18/2019    LABGLOM >60 06/18/2019    GFRAA >60 06/18/2019    GFR      06/18/2019    GFR NOT REPORTED 06/18/2019             Radiology:    No results found. Consultations:    Consults:     Final Specialist Recommendations/Findings:   IP CONSULT TO INTERNAL MEDICINE  IP CONSULT TO NEUROLOGY  PHARMACY TO DOSE VANCOMYCIN  IP CONSULT TO DIETITIAN  IP CONSULT TO PHARMACY  IP CONSULT TO DIETITIAN  IP CONSULT TO PALLIATIVE CARE  IP CONSULT TO GI      The patient was seen and examined on day of discharge and this discharge summary is in conjunction with any daily progress note from day of discharge. Discharge plan:     Disposition: Skilled Facility    Physician Follow Up:     Curtis Cagle MD  [address] ,[address]  [phone]             Requiring Further Evaluation/Follow Up POST HOSPITALIZATION/Incidental Findings:     Diet: cardiac diet    Activity: As tolerated    Instructions to Patient:     Discharge Medications:      Medication List      STOP taking these medications    levofloxacin 500 MG tablet  Commonly known as:  Anat Hunter your doctor about these medications    acetaminophen 325 MG tablet  Commonly known as:  TYLENOL     aspirin 81 MG chewable tablet     docusate sodium 100 MG capsule  Commonly known as:  COLACE     metoprolol tartrate 25 MG tablet  Commonly known as:  LOPRESSOR  Take 1 tablet by mouth 2 times daily     mirtazapine 30 MG tablet  Commonly known as:  REMERON     tamsulosin 0.4 MG capsule  Commonly known as:  FLOMAX     vitamin E 400 UNIT capsule            Time Spent on discharge is 30 to 74 minutes in patient examination, evaluation, counseling as well as medication reconciliation, prescriptions for required medications, discharge plan and follow up. Electronically signed by   Anastasia Lopez MD  7/30/2019  8:33 PM      Thank you Dr. Curtis Cagle MD for the opportunity to be involved in this patient's care.

## 2021-01-01 NOTE — CONSULTS
NEUROLOGY INPATIENT CONSULT NOTE    6/15/2019         Luciano Agarwal is a  78 y.o. male admitted on 6/13/2019 with  UTI (urinary tract infection) [N39.0]      History is obtained mostly from the medical record and from the caregivers. Chart is reviewed and patient is examined. Briefly, this is a  78 y.o. male admitted on 6/13/2019 with altered mentation. As per nursing home patient is normally alert and oriented x2 but he has been less responsive for the past 2 days. Patient is not answering any questions. Contacted the nursing staff at [address]. As per functional status; patient was alert and oriented to self and place usually. He is dependent on some of ADLs and he does get around using a wheelchair. But for the past few days he had slowly progressive change in his mentation. Vitals on admit: 148/68, 67/min, 96.3 °F.  Since admitted to [facility]; patient remained nonverbal even though alert and oriented to name call. No witnessed seizure activity. Caregivers also stated that the patient has been pocketing and he was kept n.p.o. and on IV fluids. He was hypoglycemic earlier. Of note; he was recently hospitalized at [facility] on 4/28/2019 after mechanical fall found to have left hip fracture-subcapital femoral fracture for which he underwent surgical intervention on 5/2/2019. At that time he had possible seizure-like activity with absence of postictal state. His CT head on 4/28/2019 was unremarkable. His EEG was abnormal with diffuse encephalopathy but without any epileptiform discharges. His neurologic examination was significant for cognitive impairment of moderately severe intensity. He was initiated on Aricept 5 mg qpm.       No current facility-administered medications on file prior to encounter.       Current Outpatient Medications on File Prior to Encounter   Medication Sig Dispense Refill    metoprolol tartrate (LOPRESSOR) 25 MG tablet Take 1 tablet by mouth 2 times °C) (Axillary)   Resp 15   Ht 5' 4\" (1.626 m)   Wt 147 lb (66.7 kg)   SpO2 91%   BMI 25.23 kg/m²     Blood pressure range: Systolic (82MFT), VL , Min:144 , HQW:635   ; Diastolic (83MXD), CS, Min:51, Max:127      Continuous infusions:    dextrose      dextrose 5 % and 0.45 % NaCl 125 mL/hr at 06/15/19 1140    sodium chloride 125 mL/hr at 06/15/19 0604       ON EXAMINATION:  GENERAL  Appears comfortable and in no distress   HEENT  NC/ AT   NECK  Supple and no bruits heard   Cardiovascular  S1, S2 heard; radial pulse intact   MENTAL STATUS:  Alert, oriented to name call only; nonverbal; upon repeated attempts; he does try to vocalize; followed simple commands on occasion; no hallucination or delusion   CRANIAL NERVES: II     -       Pupils reactive b/l.,  Uncooperative for fundus examination; intact venous pulsations; blinks to threat bilaterally. III,IV,VI -  EOMs full, no afferent defect, no                      LONNIE, no ptosis  V     -     Normal facial sensation  VII    -     Normal facial symmetry  VIII, IX,X, XI, XII -could not be assessed   MOTOR FUNCTION:  Normal bulk, normal tone, moves both upper and lower extremities spontaneously and purposefully but no abnormal involuntary movements and no tremors noted. SENSORY FUNCTION:  Withdraws to pinprick in both upper and lower extremities    CEREBELLAR FUNCTION:  No ataxia of limbs noted; no abnormal involuntary movements    REFLEX FUNCTION:  Symmetric 1+ in upper extremities and areflexia in bilateral lower extremities with bilateral equivocal plantars.    STATION and GAIT  Not tested         Data:    Lab Results:     Lab Results   Component Value Date    ALT 2019    AST 26 2019    TSH 8.79 (H) 2019    INR 1.0 2019    [id number] 985 2019     Hematology:  Recent Labs     19  1715 19  0540 06/15/19  0559   WBC 3.3* 2.5* 2.9*   HGB 9.1* 8.5* 8.4*   HCT 28.5* 26.8* 26.7*   * 93* 88*   INR  -- 1.0  --      Chemistry:  Recent Labs     06/13/19  2050 06/14/19  0540 06/15/19  0559   * 146* 144   K 5.5* 5.1 5.9*   * 111* 111*   CO2 24 23 21   GLUCOSE 72 62* 31*   BUN 69* 62* 53*   CREATININE 1.72* 1.57* 1.39*   CALCIUM 9.2 9.4 9.2     Recent Labs     06/13/19  1715 06/14/19  0540   PROT 7.6  --    LABALBU 3.6  --    TSH  --  8.79*   AST 26  --    ALT 19  --    AMMONIA 28  --        No results found for: PHENYTOIN, PHENYTOIN, VALPROATE, CBMZ    EEG 5/7/2019: Significantly abnormal with diffuse slowing of background activity in theta frequencies consistent with underlying diffuse cortical disease seen in patients with baseline dementia. The study did not demonstrate any ongoing electrographic seizure activity. Vitamin B12 (5/6/19): 985  Folate (5/6/19): 7.9     TSH (6/14/19): elevated at 8.79    T4, free (6/14/2019): nl at 0.97     CT head 4/28/2019: No acute intracranial abnormality. Ammonia 6/13/2019: 28          Impression and Plan: Mr. Maria Luisa Aguilar. is a 78 y.o. male with   Acute encephalopathy of unclear etiology; possible urosepsis, on IV ceftriaxone  Baseline cognitive impairment with a significant rapid decline; will get MRI brain (wo) and EEG,  and also to get dietitian consult as he has been n.p.o. Recent hosp (4/28/19) for  left hip fracture-subcapital femoral fracture; s/p repair 5/2/2019  Hx of seizure-like activity (May 2019) with unremarkable EEG; no witnessed seizure activity since then. Discussed with patient's Nurse. Will follow with you. Thank you for consultation. 2021 11:20

## 2021-12-10 NOTE — PROGRESS NOTES
"https://www.diabeteseducator.org/docs/default-source/living-with-diabetes/conquering-the-grocery-store-v1.pdf?sfvrsn=4\">   Carbohydrate Counting for Diabetes Mellitus, Adult  Carbohydrate counting is a method of keeping track of how many carbohydrates you eat. Eating carbohydrates naturally increases the amount of sugar (glucose) in the blood. Counting how many carbohydrates you eat improves your blood glucose control, which helps you manage your diabetes.  It is important to know how many carbohydrates you can safely have in each meal. This is different for every person. A dietitian can help you make a meal plan and calculate how many carbohydrates you should have at each meal and snack.  What foods contain carbohydrates?  Carbohydrates are found in the following foods:  · Grains, such as breads and cereals.  · Dried beans and soy products.  · Starchy vegetables, such as potatoes, peas, and corn.  · Fruit and fruit juices.  · Milk and yogurt.  · Sweets and snack foods, such as cake, cookies, candy, chips, and soft drinks.  How do I count carbohydrates in foods?  There are two ways to count carbohydrates in food. You can read food labels or learn standard serving sizes of foods. You can use either of the methods or a combination of both.  Using the Nutrition Facts label  The Nutrition Facts list is included on the labels of almost all packaged foods and beverages in the [place] It includes:  · The serving size.  · Information about nutrients in each serving, including the grams (g) of carbohydrate per serving.  To use the Nutrition Facts:  · Decide how many servings you will have.  · Multiply the number of servings by the number of carbohydrates per serving.  · The resulting number is the total amount of carbohydrates that you will be having.  Learning the standard serving sizes of foods  When you eat carbohydrate foods that are not packaged or do not include Nutrition Facts on the label, you need to measure the " Spoke with Dr. Jaswant Delong, who is requesting patient start on IV nutrition. Writer called Neeru Silverio, dietician, to inform her. servings in order to count the amount of carbohydrates.  · Measure the foods that you will eat with a food scale or measuring cup, if needed.  · Decide how many standard-size servings you will eat.  · Multiply the number of servings by 15. For foods that contain carbohydrates, one serving equals 15 g of carbohydrates.  ? For example, if you eat 2 cups or 10 oz (300 g) of strawberries, you will have eaten 2 servings and 30 g of carbohydrates (2 servings x 15 g = 30 g).  · For foods that have more than one food mixed, such as soups and casseroles, you must count the carbohydrates in each food that is included.  The following list contains standard serving sizes of common carbohydrate-rich foods. Each of these servings has about 15 g of carbohydrates:  · 1 slice of bread.  · 1 six-inch (15 cm) tortilla.  · ? cup or 2 oz (53 g) cooked rice or pasta.  · ½ cup or 3 oz (85 g) cooked or canned, drained and rinsed beans or lentils.  · ½ cup or 3 oz (85 g) starchy vegetable, such as peas, corn, or squash.  · ½ cup or 4 oz (120 g) hot cereal.  · ½ cup or 3 oz (85 g) boiled or mashed potatoes, or ¼ or 3 oz (85 g) of a large baked potato.  · ½ cup or 4 fl oz (118 mL) fruit juice.  · 1 cup or 8 fl oz (237 mL) milk.  · 1 small or 4 oz (106 g) apple.  · ½ or 2 oz (63 g) of a medium banana.  · 1 cup or 5 oz (150 g) strawberries.  · 3 cups or 1 oz (24 g) popped popcorn.  What is an example of carbohydrate counting?  To calculate the number of carbohydrates in this sample meal, follow the steps shown below.  Sample meal  · 3 oz (85 g) chicken breast.  · ? cup or 4 oz (106 g) brown rice.  · ½ cup or 3 oz (85 g) corn.  · 1 cup or 8 fl oz (237 mL) milk.  · 1 cup or 5 oz (150 g) strawberries with sugar-free whipped topping.  Carbohydrate calculation  1. Identify the foods that contain carbohydrates:  ? Rice.  ? Corn.  ? Milk.  ? Strawberries.  2. Calculate how many servings you have of each food:  ? 2 servings rice.  ? 1 serving  corn.  ? 1 serving milk.  ? 1 serving strawberries.  3. Multiply each number of servings by 15 g:  ? 2 servings rice x 15 g = 30 g.  ? 1 serving corn x 15 g = 15 g.  ? 1 serving milk x 15 g = 15 g.  ? 1 serving strawberries x 15 g = 15 g.  4. Add together all of the amounts to find the total grams of carbohydrates eaten:  ? 30 g + 15 g + 15 g + 15 g = 75 g of carbohydrates total.  What are tips for following this plan?  Shopping  · Develop a meal plan and then make a shopping list.  · Buy fresh and frozen vegetables, fresh and frozen fruit, dairy, eggs, beans, lentils, and whole grains.  · Look at food labels. Choose foods that have more fiber and less sugar.  · Avoid processed foods and foods with added sugars.  Meal planning  · Aim to have the same amount of carbohydrates at each meal and for each snack time.  · Plan to have regular, balanced meals and snacks.  Where to find more information  · American Diabetes Association: www.diabetes.org  · Centers for Disease Control and Prevention: www.cdc.gov  Summary  · Carbohydrate counting is a method of keeping track of how many carbohydrates you eat.  · Eating carbohydrates naturally increases the amount of sugar (glucose) in the blood.  · Counting how many carbohydrates you eat improves your blood glucose control, which helps you manage your diabetes.  · A dietitian can help you make a meal plan and calculate how many carbohydrates you should have at each meal and snack.  This information is not intended to replace advice given to you by your health care provider. Make sure you discuss any questions you have with your health care provider.  Document Revised: 12/17/2020 Document Reviewed: 12/18/2020  [employer] Patient Education © 2021 [employer]  Prediabetes Eating Plan  Prediabetes is a condition that causes blood sugar (glucose) levels to be higher than normal. This increases the risk for developing type 2 diabetes (type 2 diabetes mellitus). Working with a health  care provider or nutrition specialist (dietitian) to make diet and lifestyle changes can help prevent the onset of diabetes. These changes may help you:  · Control your blood glucose levels.  · Improve your cholesterol levels.  · Manage your blood pressure.  What are tips for following this plan?  Reading food labels  · Read food labels to check the amount of fat, salt (sodium), and sugar in prepackaged foods. Avoid foods that have:  ? Saturated fats.  ? Trans fats.  ? Added sugars.  · Avoid foods that have more than 300 milligrams (mg) of sodium per serving. Limit your sodium intake to less than 2,300 mg each day.  Shopping  · Avoid buying pre-made and processed foods.  · Avoid buying drinks with added sugar.  Cooking  · Cook with olive oil. Do not use butter, lard, or ghee.  · Bake, broil, grill, steam, or boil foods. Avoid frying.  Meal planning    · Work with your dietitian to create an eating plan that is right for you. This may include tracking how many calories you take in each day. Use a food diary, notebook, or mobile application to track what you eat at each meal.  · Consider following a Mediterranean diet. This includes:  ? Eating several servings of fresh fruits and vegetables each day.  ? Eating fish at least twice a week.  ? Eating one serving each day of whole grains, beans, nuts, and seeds.  ? Using olive oil instead of other fats.  ? Limiting alcohol.  ? Limiting red meat.  ? Using nonfat or low-fat dairy products.  · Consider following a plant-based diet. This includes dietary choices that focus on eating mostly vegetables and fruit, grains, beans, nuts, and seeds.  · If you have high blood pressure, you may need to limit your sodium intake or follow a diet such as the DASH (Dietary Approaches to Stop Hypertension) eating plan. The DASH diet aims to lower high blood pressure.    Lifestyle  · Set weight loss goals with help from your health care team. It is recommended that most people with  prediabetes lose 7% of their body weight.  · Exercise for at least 30 minutes 5 or more days a week.  · Attend a support group or seek support from a mental health counselor.  · Take over-the-counter and prescription medicines only as told by your health care provider.  What foods are recommended?  Fruits  Berries. Bananas. Apples. Oranges. Grapes. Papaya. Raffy. Pomegranate. Kiwi. Grapefruit. Cherries.  Vegetables  Lettuce. Spinach. Peas. Beets. Cauliflower. Cabbage. Broccoli. Carrots. Tomatoes. Squash. Eggplant. Herbs. Peppers. Onions. Cucumbers. Ira sprouts.  Grains  Whole grains, such as whole-wheat or whole-grain breads, crackers, cereals, and pasta. Unsweetened oatmeal. Bulgur. Barley. Quinoa. Brown rice. Corn or whole-wheat flour tortillas or taco shells.  Meats and other proteins  Seafood. Poultry without skin. Lean cuts of pork and beef. Tofu. Eggs. Nuts. Beans.  Dairy  Low-fat or fat-free dairy products, such as yogurt, cottage cheese, and cheese.  Beverages  Water. Tea. Coffee. Sugar-free or diet soda. Causey water. Low-fat or nonfat milk. Milk alternatives, such as soy or almond milk.  Fats and oils  Olive oil. Canola oil. Sunflower oil. Grapeseed oil. Avocado. Walnuts.  Sweets and desserts  Sugar-free or low-fat pudding. Sugar-free or low-fat ice cream and other frozen treats.  Seasonings and condiments  Herbs. Sodium-free spices. Mustard. Relish. Low-salt, low-sugar ketchup. Low-salt, low-sugar barbecue sauce. Low-fat or fat-free mayonnaise.  The items listed above may not be a complete list of recommended foods and beverages. Contact a dietitian for more information.  What foods are not recommended?  Fruits  Fruits canned with syrup.  Vegetables  Canned vegetables. Frozen vegetables with butter or cream sauce.  Grains  Refined white flour and flour products, such as bread, pasta, snack foods, and cereals.  Meats and other proteins  Fatty cuts of meat. Poultry with skin. Breaded or fried meat.  Processed meats.  Dairy  Full-fat yogurt, cheese, or milk.  Beverages  Sweetened drinks, such as iced tea and soda.  Fats and oils  Butter. Lard. Ghee.  Sweets and desserts  Baked goods, such as cake, cupcakes, pastries, cookies, and cheesecake.  Seasonings and condiments  Spice mixes with added salt. Ketchup. Barbecue sauce. Mayonnaise.  The items listed above may not be a complete list of foods and beverages that are not recommended. Contact a dietitian for more information.  Where to find more information  · American Diabetes Association: www.diabetes.org  Summary  · You may need to make diet and lifestyle changes to help prevent the onset of diabetes. These changes can help you control blood sugar, improve cholesterol levels, and manage blood pressure.  · Set weight loss goals with help from your health care team. It is recommended that most people with prediabetes lose 7% of their body weight.  · Consider following a Mediterranean diet. This includes eating plenty of fresh fruits and vegetables, whole grains, beans, nuts, seeds, fish, and low-fat dairy, and using olive oil instead of other fats.  This information is not intended to replace advice given to you by your health care provider. Make sure you discuss any questions you have with your health care provider.  Document Revised: 03/18/2021 Document Reviewed: 03/18/2021  [employer] Patient Education © 2021 [employer]

## 2024-09-19 NOTE — BRIEF OP NOTE
Brief Postoperative Note  ______________________________________________________________    Patient: Ashley Edmondson. YOB: 1940  MRN: [id number]  Date of Procedure: 5/2/2019    Pre-Op Diagnosis: LEFT SUBCAPITAL FEMORAL FRACTURE    Post-Op Diagnosis: Same       Procedure(s):  LEFT HIP ERIN-ARTHROPLASTY    Anesthesia: General    Surgeon(s):  Ant Loving MD    Assistant: Brant Bosch    Estimated Blood Loss (mL): 150    IVF: 500 cc crystalloid    Complications: None    Specimens: none    Implants:  Implant Name Type Inv.  Item Serial No.  Lot No. LRB No. Used   HEAD FEM 48MM HIP UNIPOL COCR Hip HEAD FEM 48MM HIP UNIPOL COCR  [employer]  [id number] Left 1   FEMORAL COMPONENT    [employer] [id number] Left 1   IMPL HIP FEM SLV UNIPOLAR TAPR 0 Hip IMPL HIP FEM SLV UNIPOLAR TAPR 0  [employer]  [id number] Left 1         Drains:   [REMOVED] External Urinary Catheter (Removed)   Urine Color Yellow 5/1/2019  4:08 AM   Urine Appearance Clear 5/1/2019  4:08 AM   Urine Odor Malodorous 5/1/2019  4:08 AM   Output (mL) 100 mL 5/1/2019  4:08 AM       Findings: see op note    Chey Engle DO  Date: 5/2/2019  Time: 11:10 AM Detail Level: Detailed Detail Level: Simple

## (undated) DEVICE — SUTURE VIC + ANTIBACT NDL MO-4 1-0 27 VCP437H

## (undated) DEVICE — STOCKINETTE,IMPERVIOUS,12X48,STERILE: Brand: MEDLINE

## (undated) DEVICE — TUBE IRRIG HNDPC HI FLO TP INTRPULS W/SUCTION TUBE

## (undated) DEVICE — PATIENT RETURN ELECTRODE, SINGLE-USE, CONTACT QUALITY MONITORING, ADULT, WITH 9FT CORD, FOR PATIENTS WEIGING OVER 33LBS. (15KG): Brand: MEGADYNE

## (undated) DEVICE — GLOVE ORANGE PI 8 1/2   MSG9085

## (undated) DEVICE — DRESSING FOAM W4XL10IN AG SIL ADH ANTIMIC POSTOP OPTIFOAM

## (undated) DEVICE — 3M™ IOBAN™ 2 ANTIMICROBIAL INCISE DRAPE 6650EZ: Brand: IOBAN™ 2

## (undated) DEVICE — Device

## (undated) DEVICE — GLOVE ORANGE PI 7   MSG9070

## (undated) DEVICE — GOWN,SIRUS,POLYRNF,BRTHSLV,XL,30/CS: Brand: MEDLINE

## (undated) DEVICE — SUTURE VCRL + SZ 0 L27IN ABSRB UD CT-1 L36MM 1/2 CIR TAPR VCP260H

## (undated) DEVICE — DRAPE,REIN 53X77,STERILE: Brand: MEDLINE

## (undated) DEVICE — SUTURE VIC + ABS BR UD X1 2-0 27IN VCP459H

## (undated) DEVICE — GOWN,AURORA,NONRNF,XL,30/CS: Brand: MEDLINE

## (undated) DEVICE — GAUZE,SPONGE,FLUFF,6"X6.75",STRL,5/TRAY: Brand: MEDLINE

## (undated) DEVICE — Z DISCONTINUED USE 2275686 GLOVE SURG SZ 8 L12IN FNGR THK13MIL WHT ISOLEX POLYISOPRENE

## (undated) DEVICE — SUTURE NONABSORBABLE  MERSILINE TP1 SIZE 5

## (undated) DEVICE — STERILE PVP: Brand: MEDLINE INDUSTRIES, INC.

## (undated) DEVICE — STRYKER PERFORMANCE SERIES SAGITTAL BLADE: Brand: STRYKER PERFORMANCE SERIES

## (undated) DEVICE — DRESSING,GAUZE,XEROFORM,CURAD,1"X8",ST: Brand: CURAD

## (undated) DEVICE — PAD,ABDOMINAL,5"X9",ST,LF,25/BX: Brand: MEDLINE INDUSTRIES, INC.

## (undated) DEVICE — SOLUTION IV IRRIG POUR BRL 0.9% SODIUM CHL 2F7124

## (undated) DEVICE — CHLORAPREP 26ML ORANGE

## (undated) DEVICE — GLOVE ORANGE PI 7 1/2   MSG9075

## (undated) DEVICE — TOTAL TRAY, 16FR 10ML SIL FOLEY, URN: Brand: MEDLINE

## (undated) DEVICE — COVER,MAYO STAND,STERILE: Brand: MEDLINE

## (undated) DEVICE — GLOVE ORANGE PI 8   MSG9080

## (undated) DEVICE — PROTECTOR ULN NRV PUR FOAM HK LOOP STRP ANATOMICALLY